# Patient Record
Sex: MALE | Race: WHITE | NOT HISPANIC OR LATINO | Employment: OTHER | URBAN - METROPOLITAN AREA
[De-identification: names, ages, dates, MRNs, and addresses within clinical notes are randomized per-mention and may not be internally consistent; named-entity substitution may affect disease eponyms.]

---

## 2019-06-11 ENCOUNTER — APPOINTMENT (EMERGENCY)
Dept: CT IMAGING | Facility: HOSPITAL | Age: 84
End: 2019-06-11
Payer: MEDICARE

## 2019-06-11 ENCOUNTER — APPOINTMENT (EMERGENCY)
Dept: RADIOLOGY | Facility: HOSPITAL | Age: 84
End: 2019-06-11
Payer: MEDICARE

## 2019-06-11 ENCOUNTER — HOSPITAL ENCOUNTER (EMERGENCY)
Facility: HOSPITAL | Age: 84
Discharge: HOME | End: 2019-06-11
Attending: EMERGENCY MEDICINE | Admitting: EMERGENCY MEDICINE
Payer: MEDICARE

## 2019-06-11 VITALS
HEART RATE: 69 BPM | OXYGEN SATURATION: 96 % | BODY MASS INDEX: 19.89 KG/M2 | SYSTOLIC BLOOD PRESSURE: 145 MMHG | DIASTOLIC BLOOD PRESSURE: 75 MMHG | RESPIRATION RATE: 20 BRPM | HEIGHT: 69 IN | TEMPERATURE: 97.5 F | WEIGHT: 134.26 LBS

## 2019-06-11 DIAGNOSIS — S70.00XA CONTUSION OF HIP: ICD-10-CM
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
## 2019-06-11 PROCEDURE — 72125 CT NECK SPINE W/O DYE: CPT

## 2019-06-11 PROCEDURE — 99284 EMERGENCY DEPT VISIT MOD MDM: CPT

## 2019-06-11 PROCEDURE — 93005 ELECTROCARDIOGRAM TRACING: CPT

## 2019-06-11 PROCEDURE — 73502 X-RAY EXAM HIP UNI 2-3 VIEWS: CPT

## 2019-06-11 PROCEDURE — 71046 X-RAY EXAM CHEST 2 VIEWS: CPT

## 2019-06-11 PROCEDURE — 99285 EMERGENCY DEPT VISIT HI MDM: CPT | Performed by: EMERGENCY MEDICINE

## 2019-06-11 PROCEDURE — 70450 CT HEAD/BRAIN W/O DYE: CPT

## 2019-06-11 RX ORDER — ASPIRIN 81 MG/1
81 TABLET ORAL DAILY
COMMUNITY
End: 2020-05-06 | Stop reason: HOSPADM

## 2019-06-12 LAB
ATRIAL RATE: 68 BPM
P AXIS: 60 DEGREES
PR INTERVAL: 174 MS
QRS AXIS: 52 DEGREES
QRSD INTERVAL: 96 MS
QT INTERVAL: 386 MS
QTC INTERVAL: 410 MS
T WAVE AXIS: -13 DEGREES
VENTRICULAR RATE: 68 BPM

## 2019-06-12 PROCEDURE — 93010 ELECTROCARDIOGRAM REPORT: CPT | Performed by: INTERNAL MEDICINE

## 2020-04-28 ENCOUNTER — APPOINTMENT (EMERGENCY)
Dept: RADIOLOGY | Facility: HOSPITAL | Age: 85
End: 2020-04-28
Payer: MEDICARE

## 2020-04-28 ENCOUNTER — HOSPITAL ENCOUNTER (EMERGENCY)
Facility: HOSPITAL | Age: 85
End: 2020-04-28
Attending: EMERGENCY MEDICINE
Payer: MEDICARE

## 2020-04-28 ENCOUNTER — HOSPITAL ENCOUNTER (INPATIENT)
Facility: HOSPITAL | Age: 85
LOS: 8 days | Discharge: HOME HEALTH | DRG: 518 | End: 2020-05-06
Attending: SURGERY | Admitting: SURGERY
Payer: MEDICARE

## 2020-04-28 ENCOUNTER — HOSPITAL ENCOUNTER (EMERGENCY)
Facility: HOSPITAL | Age: 85
Discharge: HOME | End: 2020-04-28
Attending: EMERGENCY MEDICINE | Admitting: EMERGENCY MEDICINE
Payer: MEDICARE

## 2020-04-28 ENCOUNTER — APPOINTMENT (EMERGENCY)
Dept: CT IMAGING | Facility: HOSPITAL | Age: 85
End: 2020-04-28
Payer: MEDICARE

## 2020-04-28 VITALS
HEART RATE: 95 BPM | TEMPERATURE: 98.4 F | DIASTOLIC BLOOD PRESSURE: 73 MMHG | SYSTOLIC BLOOD PRESSURE: 156 MMHG | OXYGEN SATURATION: 97 % | RESPIRATION RATE: 20 BRPM

## 2020-04-28 VITALS
WEIGHT: 134.26 LBS | OXYGEN SATURATION: 97 % | DIASTOLIC BLOOD PRESSURE: 79 MMHG | TEMPERATURE: 97.8 F | HEART RATE: 79 BPM | SYSTOLIC BLOOD PRESSURE: 166 MMHG | BODY MASS INDEX: 19.83 KG/M2 | RESPIRATION RATE: 18 BRPM

## 2020-04-28 DIAGNOSIS — M25.512 LEFT SHOULDER PAIN: ICD-10-CM

## 2020-04-28 DIAGNOSIS — S02.2XXA NASAL FRACTURE: ICD-10-CM

## 2020-04-28 DIAGNOSIS — S00.83XA TRAUMATIC HEMATOMA OF FOREHEAD, INITIAL ENCOUNTER: ICD-10-CM

## 2020-04-28 DIAGNOSIS — S12.111A CLOSED ODONTOID FRACTURE WITH TYPE II MORPHOLOGY AND POSTERIOR DISPLACEMENT, INITIAL ENCOUNTER (HCC): Primary | ICD-10-CM

## 2020-04-28 DIAGNOSIS — T07.XXXA ABRASIONS OF MULTIPLE SITES: ICD-10-CM

## 2020-04-28 DIAGNOSIS — S02.2XXA CLOSED FRACTURE OF NASAL BONE, INITIAL ENCOUNTER: ICD-10-CM
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
## 2020-04-28 DIAGNOSIS — S12.111A: Primary | ICD-10-CM

## 2020-04-28 DIAGNOSIS — M25.552 LEFT HIP PAIN: ICD-10-CM
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
## 2020-04-28 DIAGNOSIS — S09.90XA INJURY OF HEAD, INITIAL ENCOUNTER: ICD-10-CM

## 2020-04-28 DIAGNOSIS — T07.XXXA MULTIPLE ABRASIONS: ICD-10-CM

## 2020-04-28 DIAGNOSIS — S61.218A: ICD-10-CM

## 2020-04-28 DIAGNOSIS — S62.629A AVULSION FRACTURE OF MIDDLE PHALANX OF FINGER: ICD-10-CM

## 2020-04-28 PROBLEM — J98.11 ATELECTASIS: Status: ACTIVE | Noted: 2020-04-28

## 2020-04-28 PROBLEM — K76.89 LIVER CYST: Status: ACTIVE | Noted: 2020-04-28

## 2020-04-28 PROBLEM — R91.1 PULMONARY NODULE: Status: ACTIVE | Noted: 2020-04-28

## 2020-04-28 PROBLEM — N28.1 RENAL CYST: Status: ACTIVE | Noted: 2020-04-28

## 2020-04-28 PROBLEM — S12.110A ODONTOID FRACTURE WITH TYPE II MORPHOLOGY (HCC): Status: ACTIVE | Noted: 2020-04-28

## 2020-04-28 PROBLEM — R19.5 HEME POSITIVE STOOL: Status: ACTIVE | Noted: 2020-04-28

## 2020-04-28 LAB
ALBUMIN SERPL BCP-MCNC: 3.4 G/DL (ref 3.5–5)
ALP SERPL-CCNC: 111 U/L (ref 46–116)
ALT SERPL W P-5'-P-CCNC: 55 U/L (ref 12–78)
ANION GAP SERPL CALCULATED.3IONS-SCNC: 9 MMOL/L (ref 4–13)
APTT PPP: 27 SECONDS (ref 23–37)
AST SERPL W P-5'-P-CCNC: 38 U/L (ref 5–45)
ATRIAL RATE: 92 BPM
BACTERIA UR QL AUTO: ABNORMAL /HPF
BASOPHILS # BLD AUTO: 0.02 THOUSANDS/ΜL (ref 0–0.1)
BASOPHILS NFR BLD AUTO: 0 % (ref 0–1)
BILIRUB SERPL-MCNC: 0.6 MG/DL (ref 0.2–1)
BILIRUB UR QL STRIP: NEGATIVE
BUN SERPL-MCNC: 42 MG/DL (ref 5–25)
CALCIUM SERPL-MCNC: 9.4 MG/DL (ref 8.3–10.1)
CHLORIDE SERPL-SCNC: 105 MMOL/L (ref 100–108)
CLARITY UR: CLEAR
CO2 SERPL-SCNC: 23 MMOL/L (ref 21–32)
COLOR UR: YELLOW
CREAT SERPL-MCNC: 1.04 MG/DL (ref 0.6–1.3)
EOSINOPHIL # BLD AUTO: 0 THOUSAND/ΜL (ref 0–0.61)
EOSINOPHIL NFR BLD AUTO: 0 % (ref 0–6)
ERYTHROCYTE [DISTWIDTH] IN BLOOD BY AUTOMATED COUNT: 14.3 % (ref 11.6–15.1)
GFR SERPL CREATININE-BSD FRML MDRD: 66 ML/MIN/1.73SQ M
GLUCOSE SERPL-MCNC: 151 MG/DL (ref 65–140)
GLUCOSE UR STRIP-MCNC: NEGATIVE MG/DL
HCT VFR BLD AUTO: 37.6 % (ref 36.5–49.3)
HGB BLD-MCNC: 12.1 G/DL (ref 12–17)
HGB UR QL STRIP.AUTO: ABNORMAL
IMM GRANULOCYTES # BLD AUTO: 0.08 THOUSAND/UL (ref 0–0.2)
IMM GRANULOCYTES NFR BLD AUTO: 1 % (ref 0–2)
INR PPP: 1.16 (ref 0.84–1.19)
KETONES UR STRIP-MCNC: NEGATIVE MG/DL
LEUKOCYTE ESTERASE UR QL STRIP: NEGATIVE
LYMPHOCYTES # BLD AUTO: 0.52 THOUSANDS/ΜL (ref 0.6–4.47)
LYMPHOCYTES NFR BLD AUTO: 4 % (ref 14–44)
MCH RBC QN AUTO: 31.6 PG (ref 26.8–34.3)
MCHC RBC AUTO-ENTMCNC: 32.2 G/DL (ref 31.4–37.4)
MCV RBC AUTO: 98 FL (ref 82–98)
MONOCYTES # BLD AUTO: 0.71 THOUSAND/ΜL (ref 0.17–1.22)
MONOCYTES NFR BLD AUTO: 6 % (ref 4–12)
NEUTROPHILS # BLD AUTO: 11.43 THOUSANDS/ΜL (ref 1.85–7.62)
NEUTS SEG NFR BLD AUTO: 89 % (ref 43–75)
NITRITE UR QL STRIP: NEGATIVE
NON-SQ EPI CELLS URNS QL MICRO: ABNORMAL /HPF
NRBC BLD AUTO-RTO: 0 /100 WBCS
P AXIS: 76 DEGREES
PH UR STRIP.AUTO: 6 [PH]
PLATELET # BLD AUTO: 217 THOUSANDS/UL (ref 149–390)
PMV BLD AUTO: 9.7 FL (ref 8.9–12.7)
POTASSIUM SERPL-SCNC: 4.6 MMOL/L (ref 3.5–5.3)
PR INTERVAL: 166 MS
PROT SERPL-MCNC: 7.4 G/DL (ref 6.4–8.2)
PROT UR STRIP-MCNC: NEGATIVE MG/DL
PROTHROMBIN TIME: 14.9 SECONDS (ref 11.6–14.5)
QRS AXIS: 76 DEGREES
QRSD INTERVAL: 92 MS
QT INTERVAL: 356 MS
QTC INTERVAL: 440 MS
RBC # BLD AUTO: 3.83 MILLION/UL (ref 3.88–5.62)
RBC #/AREA URNS AUTO: ABNORMAL /HPF
SODIUM SERPL-SCNC: 137 MMOL/L (ref 136–145)
SP GR UR STRIP.AUTO: 1.01 (ref 1–1.03)
T WAVE AXIS: 41 DEGREES
UROBILINOGEN UR QL STRIP.AUTO: 0.2 E.U./DL
VENTRICULAR RATE: 92 BPM
WBC # BLD AUTO: 12.76 THOUSAND/UL (ref 4.31–10.16)
WBC #/AREA URNS AUTO: ABNORMAL /HPF

## 2020-04-28 PROCEDURE — 73030 X-RAY EXAM OF SHOULDER: CPT

## 2020-04-28 PROCEDURE — 99285 EMERGENCY DEPT VISIT HI MDM: CPT

## 2020-04-28 PROCEDURE — 70498 CT ANGIOGRAPHY NECK: CPT

## 2020-04-28 PROCEDURE — 99284 EMERGENCY DEPT VISIT MOD MDM: CPT | Performed by: PHYSICIAN ASSISTANT

## 2020-04-28 PROCEDURE — 93005 ELECTROCARDIOGRAM TRACING: CPT

## 2020-04-28 PROCEDURE — 96375 TX/PRO/DX INJ NEW DRUG ADDON: CPT

## 2020-04-28 PROCEDURE — 99222 1ST HOSP IP/OBS MODERATE 55: CPT | Performed by: SURGERY

## 2020-04-28 PROCEDURE — 99285 EMERGENCY DEPT VISIT HI MDM: CPT | Performed by: EMERGENCY MEDICINE

## 2020-04-28 PROCEDURE — 73140 X-RAY EXAM OF FINGER(S): CPT

## 2020-04-28 PROCEDURE — 85730 THROMBOPLASTIN TIME PARTIAL: CPT | Performed by: EMERGENCY MEDICINE

## 2020-04-28 PROCEDURE — 71260 CT THORAX DX C+: CPT

## 2020-04-28 PROCEDURE — 85610 PROTHROMBIN TIME: CPT | Performed by: EMERGENCY MEDICINE

## 2020-04-28 PROCEDURE — 73502 X-RAY EXAM HIP UNI 2-3 VIEWS: CPT

## 2020-04-28 PROCEDURE — 72125 CT NECK SPINE W/O DYE: CPT

## 2020-04-28 PROCEDURE — 85025 COMPLETE CBC W/AUTO DIFF WBC: CPT | Performed by: EMERGENCY MEDICINE

## 2020-04-28 PROCEDURE — 81001 URINALYSIS AUTO W/SCOPE: CPT | Performed by: EMERGENCY MEDICINE

## 2020-04-28 PROCEDURE — 99284 EMERGENCY DEPT VISIT MOD MDM: CPT

## 2020-04-28 PROCEDURE — 80053 COMPREHEN METABOLIC PANEL: CPT | Performed by: EMERGENCY MEDICINE

## 2020-04-28 PROCEDURE — 96374 THER/PROPH/DIAG INJ IV PUSH: CPT

## 2020-04-28 PROCEDURE — 93010 ELECTROCARDIOGRAM REPORT: CPT | Performed by: INTERNAL MEDICINE

## 2020-04-28 PROCEDURE — 36415 COLL VENOUS BLD VENIPUNCTURE: CPT | Performed by: EMERGENCY MEDICINE

## 2020-04-28 PROCEDURE — 70450 CT HEAD/BRAIN W/O DYE: CPT

## 2020-04-28 PROCEDURE — 70496 CT ANGIOGRAPHY HEAD: CPT

## 2020-04-28 PROCEDURE — 74177 CT ABD & PELVIS W/CONTRAST: CPT

## 2020-04-28 RX ORDER — SODIUM CHLORIDE, SODIUM GLUCONATE, SODIUM ACETATE, POTASSIUM CHLORIDE, MAGNESIUM CHLORIDE, SODIUM PHOSPHATE, DIBASIC, AND POTASSIUM PHOSPHATE .53; .5; .37; .037; .03; .012; .00082 G/100ML; G/100ML; G/100ML; G/100ML; G/100ML; G/100ML; G/100ML
50 INJECTION, SOLUTION INTRAVENOUS CONTINUOUS
Status: DISCONTINUED | OUTPATIENT
Start: 2020-04-28 | End: 2020-04-29

## 2020-04-28 RX ORDER — ACETAMINOPHEN 325 MG/1
650 TABLET ORAL EVERY 6 HOURS PRN
Status: DISCONTINUED | OUTPATIENT
Start: 2020-04-28 | End: 2020-04-28

## 2020-04-28 RX ORDER — ACETAMINOPHEN 500 MG
500 TABLET ORAL EVERY 4 HOURS PRN
Qty: 30 TABLET | Refills: 0 | Status: SHIPPED | OUTPATIENT
Start: 2020-04-28

## 2020-04-28 RX ORDER — ONDANSETRON 2 MG/ML
4 INJECTION INTRAMUSCULAR; INTRAVENOUS ONCE
Status: COMPLETED | OUTPATIENT
Start: 2020-04-28 | End: 2020-04-28

## 2020-04-28 RX ORDER — OXYCODONE HYDROCHLORIDE 5 MG/1
2.5 TABLET ORAL EVERY 4 HOURS PRN
Status: DISCONTINUED | OUTPATIENT
Start: 2020-04-28 | End: 2020-04-29

## 2020-04-28 RX ORDER — ONDANSETRON 2 MG/ML
4 INJECTION INTRAMUSCULAR; INTRAVENOUS EVERY 4 HOURS PRN
Status: DISCONTINUED | OUTPATIENT
Start: 2020-04-28 | End: 2020-05-06 | Stop reason: HOSPADM

## 2020-04-28 RX ORDER — OXYCODONE HYDROCHLORIDE 5 MG/1
5 TABLET ORAL EVERY 4 HOURS PRN
Status: DISCONTINUED | OUTPATIENT
Start: 2020-04-28 | End: 2020-04-29

## 2020-04-28 RX ORDER — GINSENG 100 MG
1 CAPSULE ORAL ONCE
Status: COMPLETED | OUTPATIENT
Start: 2020-04-28 | End: 2020-04-28

## 2020-04-28 RX ORDER — ACETAMINOPHEN 325 MG/1
650 TABLET ORAL EVERY 6 HOURS PRN
Status: DISCONTINUED | OUTPATIENT
Start: 2020-04-28 | End: 2020-04-30

## 2020-04-28 RX ORDER — AMOXICILLIN 250 MG
1 CAPSULE ORAL
Status: DISCONTINUED | OUTPATIENT
Start: 2020-04-28 | End: 2020-05-01

## 2020-04-28 RX ORDER — BACITRACIN, NEOMYCIN, POLYMYXIN B 400; 3.5; 5 [USP'U]/G; MG/G; [USP'U]/G
1 OINTMENT TOPICAL ONCE
Status: COMPLETED | OUTPATIENT
Start: 2020-04-28 | End: 2020-04-28

## 2020-04-28 RX ORDER — CHLORHEXIDINE GLUCONATE 0.12 MG/ML
15 RINSE ORAL EVERY 12 HOURS SCHEDULED
Status: DISCONTINUED | OUTPATIENT
Start: 2020-04-28 | End: 2020-04-28

## 2020-04-28 RX ORDER — AMOXICILLIN 250 MG
1 CAPSULE ORAL
Status: DISCONTINUED | OUTPATIENT
Start: 2020-04-28 | End: 2020-04-28

## 2020-04-28 RX ORDER — FENTANYL CITRATE 50 UG/ML
25 INJECTION, SOLUTION INTRAMUSCULAR; INTRAVENOUS ONCE
Status: COMPLETED | OUTPATIENT
Start: 2020-04-28 | End: 2020-04-28

## 2020-04-28 RX ORDER — LIDOCAINE HYDROCHLORIDE AND EPINEPHRINE 10; 10 MG/ML; UG/ML
5 INJECTION, SOLUTION INFILTRATION; PERINEURAL ONCE
Status: DISCONTINUED | OUTPATIENT
Start: 2020-04-28 | End: 2020-04-28 | Stop reason: HOSPADM

## 2020-04-28 RX ORDER — BACITRACIN, NEOMYCIN, POLYMYXIN B 400; 3.5; 5 [USP'U]/G; MG/G; [USP'U]/G
OINTMENT TOPICAL 2 TIMES DAILY
Qty: 15 G | Refills: 0 | Status: ON HOLD | OUTPATIENT
Start: 2020-04-28 | End: 2022-04-22 | Stop reason: ALTCHOICE

## 2020-04-28 RX ORDER — GINSENG 100 MG
1 CAPSULE ORAL 2 TIMES DAILY
Status: DISCONTINUED | OUTPATIENT
Start: 2020-04-29 | End: 2020-04-29

## 2020-04-28 RX ADMIN — BACITRACIN ZINC 1 LARGE APPLICATION: 500 OINTMENT TOPICAL at 20:29

## 2020-04-28 RX ADMIN — FENTANYL CITRATE 25 MCG: 50 INJECTION, SOLUTION INTRAMUSCULAR; INTRAVENOUS at 19:25

## 2020-04-28 RX ADMIN — ONDANSETRON 4 MG: 2 INJECTION INTRAMUSCULAR; INTRAVENOUS at 19:25

## 2020-04-28 RX ADMIN — IOHEXOL 120 ML: 350 INJECTION, SOLUTION INTRAVENOUS at 20:17

## 2020-04-28 RX ADMIN — BACITRACIN ZINC NEOMYCIN SULFATE POLYMYXIN B SULFATE 1 LARGE APPLICATION: 400; 3.5; 5 OINTMENT TOPICAL at 12:07

## 2020-04-29 ENCOUNTER — APPOINTMENT (INPATIENT)
Dept: RADIOLOGY | Facility: HOSPITAL | Age: 85
DRG: 518 | End: 2020-04-29
Payer: MEDICARE

## 2020-04-29 LAB
ANION GAP SERPL CALCULATED.3IONS-SCNC: 3 MMOL/L (ref 4–13)
BASOPHILS # BLD AUTO: 0.02 THOUSANDS/ΜL (ref 0–0.1)
BASOPHILS NFR BLD AUTO: 0 % (ref 0–1)
BUN SERPL-MCNC: 44 MG/DL (ref 5–25)
CA-I BLD-SCNC: 1.23 MMOL/L (ref 1.12–1.32)
CALCIUM SERPL-MCNC: 9.6 MG/DL (ref 8.3–10.1)
CHLORIDE SERPL-SCNC: 112 MMOL/L (ref 100–108)
CO2 SERPL-SCNC: 23 MMOL/L (ref 21–32)
CREAT SERPL-MCNC: 0.98 MG/DL (ref 0.6–1.3)
EOSINOPHIL # BLD AUTO: 0 THOUSAND/ΜL (ref 0–0.61)
EOSINOPHIL NFR BLD AUTO: 0 % (ref 0–6)
ERYTHROCYTE [DISTWIDTH] IN BLOOD BY AUTOMATED COUNT: 14.4 % (ref 11.6–15.1)
GFR SERPL CREATININE-BSD FRML MDRD: 71 ML/MIN/1.73SQ M
GLUCOSE SERPL-MCNC: 100 MG/DL (ref 65–140)
HCT VFR BLD AUTO: 35.4 % (ref 36.5–49.3)
HGB BLD-MCNC: 11.8 G/DL (ref 12–17)
IMM GRANULOCYTES # BLD AUTO: 0.05 THOUSAND/UL (ref 0–0.2)
IMM GRANULOCYTES NFR BLD AUTO: 1 % (ref 0–2)
LYMPHOCYTES # BLD AUTO: 1.16 THOUSANDS/ΜL (ref 0.6–4.47)
LYMPHOCYTES NFR BLD AUTO: 11 % (ref 14–44)
MAGNESIUM SERPL-MCNC: 2.3 MG/DL (ref 1.6–2.6)
MCH RBC QN AUTO: 32.2 PG (ref 26.8–34.3)
MCHC RBC AUTO-ENTMCNC: 33.3 G/DL (ref 31.4–37.4)
MCV RBC AUTO: 97 FL (ref 82–98)
MONOCYTES # BLD AUTO: 1.04 THOUSAND/ΜL (ref 0.17–1.22)
MONOCYTES NFR BLD AUTO: 10 % (ref 4–12)
NEUTROPHILS # BLD AUTO: 8.63 THOUSANDS/ΜL (ref 1.85–7.62)
NEUTS SEG NFR BLD AUTO: 78 % (ref 43–75)
NRBC BLD AUTO-RTO: 0 /100 WBCS
PHOSPHATE SERPL-MCNC: 2.6 MG/DL (ref 2.3–4.1)
PLATELET # BLD AUTO: 203 THOUSANDS/UL (ref 149–390)
PMV BLD AUTO: 10 FL (ref 8.9–12.7)
POTASSIUM SERPL-SCNC: 4.1 MMOL/L (ref 3.5–5.3)
RBC # BLD AUTO: 3.67 MILLION/UL (ref 3.88–5.62)
SODIUM SERPL-SCNC: 138 MMOL/L (ref 136–145)
WBC # BLD AUTO: 10.9 THOUSAND/UL (ref 4.31–10.16)

## 2020-04-29 PROCEDURE — 97166 OT EVAL MOD COMPLEX 45 MIN: CPT

## 2020-04-29 PROCEDURE — 85025 COMPLETE CBC W/AUTO DIFF WBC: CPT | Performed by: PHYSICIAN ASSISTANT

## 2020-04-29 PROCEDURE — 97163 PT EVAL HIGH COMPLEX 45 MIN: CPT

## 2020-04-29 PROCEDURE — 99233 SBSQ HOSP IP/OBS HIGH 50: CPT | Performed by: SURGERY

## 2020-04-29 PROCEDURE — 83735 ASSAY OF MAGNESIUM: CPT | Performed by: PHYSICIAN ASSISTANT

## 2020-04-29 PROCEDURE — 72040 X-RAY EXAM NECK SPINE 2-3 VW: CPT

## 2020-04-29 PROCEDURE — 80048 BASIC METABOLIC PNL TOTAL CA: CPT | Performed by: PHYSICIAN ASSISTANT

## 2020-04-29 PROCEDURE — 84100 ASSAY OF PHOSPHORUS: CPT | Performed by: PHYSICIAN ASSISTANT

## 2020-04-29 PROCEDURE — 82330 ASSAY OF CALCIUM: CPT | Performed by: PHYSICIAN ASSISTANT

## 2020-04-29 PROCEDURE — 99222 1ST HOSP IP/OBS MODERATE 55: CPT | Performed by: NEUROLOGICAL SURGERY

## 2020-04-29 RX ORDER — GINSENG 100 MG
1 CAPSULE ORAL 2 TIMES DAILY
Status: DISCONTINUED | OUTPATIENT
Start: 2020-04-29 | End: 2020-05-06 | Stop reason: HOSPADM

## 2020-04-29 RX ADMIN — NICOTINE 1 PATCH: 7 PATCH TRANSDERMAL at 08:22

## 2020-04-29 RX ADMIN — BACITRACIN 1 SMALL APPLICATION: 500 OINTMENT TOPICAL at 18:16

## 2020-04-29 RX ADMIN — ENOXAPARIN SODIUM 40 MG: 40 INJECTION SUBCUTANEOUS at 08:23

## 2020-04-29 RX ADMIN — SODIUM CHLORIDE, SODIUM GLUCONATE, SODIUM ACETATE, POTASSIUM CHLORIDE, MAGNESIUM CHLORIDE, SODIUM PHOSPHATE, DIBASIC, AND POTASSIUM PHOSPHATE 50 ML/HR: .53; .5; .37; .037; .03; .012; .00082 INJECTION, SOLUTION INTRAVENOUS at 00:34

## 2020-04-29 RX ADMIN — BACITRACIN 1 SMALL APPLICATION: 500 OINTMENT TOPICAL at 02:18

## 2020-04-29 RX ADMIN — POTASSIUM & SODIUM PHOSPHATES POWDER PACK 280-160-250 MG 1 PACKET: 280-160-250 PACK at 08:23

## 2020-04-29 RX ADMIN — BACITRACIN 1 SMALL APPLICATION: 500 OINTMENT TOPICAL at 08:23

## 2020-04-30 PROBLEM — K42.9 UMBILICAL HERNIA: Status: ACTIVE | Noted: 2020-04-30

## 2020-04-30 PROBLEM — C67.9 BLADDER CANCER (HCC): Chronic | Status: ACTIVE | Noted: 2020-04-30

## 2020-04-30 PROBLEM — R41.0 DELIRIUM: Status: ACTIVE | Noted: 2020-04-30

## 2020-04-30 PROBLEM — I25.10 CAD (CORONARY ARTERY DISEASE): Status: ACTIVE | Noted: 2020-04-30

## 2020-04-30 PROBLEM — Z95.5 H/O HEART ARTERY STENT: Status: ACTIVE | Noted: 2020-04-30
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
## 2020-04-30 LAB
ANION GAP SERPL CALCULATED.3IONS-SCNC: 6 MMOL/L (ref 4–13)
BASOPHILS # BLD AUTO: 0.02 THOUSANDS/ΜL (ref 0–0.1)
BASOPHILS NFR BLD AUTO: 0 % (ref 0–1)
BUN SERPL-MCNC: 38 MG/DL (ref 5–25)
CALCIUM SERPL-MCNC: 9.6 MG/DL (ref 8.3–10.1)
CHLORIDE SERPL-SCNC: 111 MMOL/L (ref 100–108)
CO2 SERPL-SCNC: 24 MMOL/L (ref 21–32)
CREAT SERPL-MCNC: 0.86 MG/DL (ref 0.6–1.3)
EOSINOPHIL # BLD AUTO: 0.01 THOUSAND/ΜL (ref 0–0.61)
EOSINOPHIL NFR BLD AUTO: 0 % (ref 0–6)
ERYTHROCYTE [DISTWIDTH] IN BLOOD BY AUTOMATED COUNT: 14.6 % (ref 11.6–15.1)
GFR SERPL CREATININE-BSD FRML MDRD: 80 ML/MIN/1.73SQ M
GLUCOSE SERPL-MCNC: 115 MG/DL (ref 65–140)
HCT VFR BLD AUTO: 32.5 % (ref 36.5–49.3)
HGB BLD-MCNC: 10.6 G/DL (ref 12–17)
IMM GRANULOCYTES # BLD AUTO: 0.04 THOUSAND/UL (ref 0–0.2)
IMM GRANULOCYTES NFR BLD AUTO: 0 % (ref 0–2)
LYMPHOCYTES # BLD AUTO: 0.82 THOUSANDS/ΜL (ref 0.6–4.47)
LYMPHOCYTES NFR BLD AUTO: 8 % (ref 14–44)
MCH RBC QN AUTO: 31.7 PG (ref 26.8–34.3)
MCHC RBC AUTO-ENTMCNC: 32.6 G/DL (ref 31.4–37.4)
MCV RBC AUTO: 97 FL (ref 82–98)
MONOCYTES # BLD AUTO: 0.76 THOUSAND/ΜL (ref 0.17–1.22)
MONOCYTES NFR BLD AUTO: 7 % (ref 4–12)
NEUTROPHILS # BLD AUTO: 8.7 THOUSANDS/ΜL (ref 1.85–7.62)
NEUTS SEG NFR BLD AUTO: 85 % (ref 43–75)
NRBC BLD AUTO-RTO: 0 /100 WBCS
PA ADP BLD-ACNC: 548 ARU
PLATELET # BLD AUTO: 183 THOUSANDS/UL (ref 149–390)
PMV BLD AUTO: 10.4 FL (ref 8.9–12.7)
POTASSIUM SERPL-SCNC: 4 MMOL/L (ref 3.5–5.3)
RBC # BLD AUTO: 3.34 MILLION/UL (ref 3.88–5.62)
SODIUM SERPL-SCNC: 141 MMOL/L (ref 136–145)
WBC # BLD AUTO: 10.35 THOUSAND/UL (ref 4.31–10.16)

## 2020-04-30 PROCEDURE — NC001 PR NO CHARGE: Performed by: NURSE PRACTITIONER

## 2020-04-30 PROCEDURE — 99233 SBSQ HOSP IP/OBS HIGH 50: CPT | Performed by: PHYSICIAN ASSISTANT

## 2020-04-30 PROCEDURE — 85025 COMPLETE CBC W/AUTO DIFF WBC: CPT | Performed by: PHYSICIAN ASSISTANT

## 2020-04-30 PROCEDURE — 80048 BASIC METABOLIC PNL TOTAL CA: CPT | Performed by: PHYSICIAN ASSISTANT

## 2020-04-30 PROCEDURE — 99232 SBSQ HOSP IP/OBS MODERATE 35: CPT | Performed by: EMERGENCY MEDICINE

## 2020-04-30 PROCEDURE — 85576 BLOOD PLATELET AGGREGATION: CPT | Performed by: PHYSICIAN ASSISTANT

## 2020-04-30 PROCEDURE — 99222 1ST HOSP IP/OBS MODERATE 55: CPT | Performed by: INTERNAL MEDICINE

## 2020-04-30 PROCEDURE — NC001 PR NO CHARGE: Performed by: NEUROLOGICAL SURGERY

## 2020-04-30 RX ORDER — POLYETHYLENE GLYCOL 3350 17 G/17G
17 POWDER, FOR SOLUTION ORAL DAILY PRN
Status: DISCONTINUED | OUTPATIENT
Start: 2020-04-30 | End: 2020-05-06 | Stop reason: HOSPADM

## 2020-04-30 RX ORDER — ACETAMINOPHEN 325 MG/1
975 TABLET ORAL EVERY 8 HOURS SCHEDULED
Status: DISCONTINUED | OUTPATIENT
Start: 2020-04-30 | End: 2020-05-06 | Stop reason: HOSPADM

## 2020-04-30 RX ORDER — LANOLIN ALCOHOL/MO/W.PET/CERES
3 CREAM (GRAM) TOPICAL
Status: DISCONTINUED | OUTPATIENT
Start: 2020-04-30 | End: 2020-05-06 | Stop reason: HOSPADM

## 2020-04-30 RX ORDER — LIDOCAINE 50 MG/G
1 PATCH TOPICAL DAILY
Status: DISCONTINUED | OUTPATIENT
Start: 2020-04-30 | End: 2020-05-06 | Stop reason: HOSPADM

## 2020-04-30 RX ADMIN — SENNOSIDES AND DOCUSATE SODIUM 1 TABLET: 8.6; 5 TABLET ORAL at 21:42

## 2020-04-30 RX ADMIN — ACETAMINOPHEN 975 MG: 325 TABLET ORAL at 21:43

## 2020-04-30 RX ADMIN — BACITRACIN 1 SMALL APPLICATION: 500 OINTMENT TOPICAL at 09:11

## 2020-04-30 RX ADMIN — MELATONIN 3 MG: at 21:42

## 2020-04-30 RX ADMIN — ENOXAPARIN SODIUM 40 MG: 40 INJECTION SUBCUTANEOUS at 09:11

## 2020-04-30 RX ADMIN — LIDOCAINE 1 PATCH: 50 PATCH TOPICAL at 10:13

## 2020-04-30 RX ADMIN — ACETAMINOPHEN 975 MG: 325 TABLET ORAL at 13:46

## 2020-04-30 RX ADMIN — BACITRACIN 1 SMALL APPLICATION: 500 OINTMENT TOPICAL at 17:07

## 2020-04-30 RX ADMIN — NICOTINE 1 PATCH: 7 PATCH TRANSDERMAL at 09:11

## 2020-05-01 PROBLEM — R19.5 HEME POSITIVE STOOL: Status: RESOLVED | Noted: 2020-04-28 | Resolved: 2020-05-01

## 2020-05-01 LAB
ANION GAP SERPL CALCULATED.3IONS-SCNC: 5 MMOL/L (ref 4–13)
BASOPHILS # BLD AUTO: 0.05 THOUSANDS/ΜL (ref 0–0.1)
BASOPHILS NFR BLD AUTO: 1 % (ref 0–1)
BUN SERPL-MCNC: 37 MG/DL (ref 5–25)
CALCIUM SERPL-MCNC: 9.1 MG/DL (ref 8.3–10.1)
CHLORIDE SERPL-SCNC: 110 MMOL/L (ref 100–108)
CO2 SERPL-SCNC: 23 MMOL/L (ref 21–32)
CREAT SERPL-MCNC: 0.89 MG/DL (ref 0.6–1.3)
EOSINOPHIL # BLD AUTO: 0.01 THOUSAND/ΜL (ref 0–0.61)
EOSINOPHIL NFR BLD AUTO: 0 % (ref 0–6)
ERYTHROCYTE [DISTWIDTH] IN BLOOD BY AUTOMATED COUNT: 14.5 % (ref 11.6–15.1)
GFR SERPL CREATININE-BSD FRML MDRD: 79 ML/MIN/1.73SQ M
GLUCOSE SERPL-MCNC: 98 MG/DL (ref 65–140)
HCT VFR BLD AUTO: 30.8 % (ref 36.5–49.3)
HGB BLD-MCNC: 9.9 G/DL (ref 12–17)
IMM GRANULOCYTES # BLD AUTO: 0.04 THOUSAND/UL (ref 0–0.2)
IMM GRANULOCYTES NFR BLD AUTO: 0 % (ref 0–2)
LYMPHOCYTES # BLD AUTO: 0.98 THOUSANDS/ΜL (ref 0.6–4.47)
LYMPHOCYTES NFR BLD AUTO: 10 % (ref 14–44)
MCH RBC QN AUTO: 31.8 PG (ref 26.8–34.3)
MCHC RBC AUTO-ENTMCNC: 32.1 G/DL (ref 31.4–37.4)
MCV RBC AUTO: 99 FL (ref 82–98)
MONOCYTES # BLD AUTO: 0.72 THOUSAND/ΜL (ref 0.17–1.22)
MONOCYTES NFR BLD AUTO: 7 % (ref 4–12)
NEUTROPHILS # BLD AUTO: 8.05 THOUSANDS/ΜL (ref 1.85–7.62)
NEUTS SEG NFR BLD AUTO: 82 % (ref 43–75)
NRBC BLD AUTO-RTO: 0 /100 WBCS
PLATELET # BLD AUTO: 185 THOUSANDS/UL (ref 149–390)
PMV BLD AUTO: 10.3 FL (ref 8.9–12.7)
POTASSIUM SERPL-SCNC: 3.9 MMOL/L (ref 3.5–5.3)
RBC # BLD AUTO: 3.11 MILLION/UL (ref 3.88–5.62)
SODIUM SERPL-SCNC: 138 MMOL/L (ref 136–145)
TSH SERPL DL<=0.05 MIU/L-ACNC: 2.96 UIU/ML (ref 0.36–3.74)
VIT B12 SERPL-MCNC: 536 PG/ML (ref 100–900)
WBC # BLD AUTO: 9.85 THOUSAND/UL (ref 4.31–10.16)

## 2020-05-01 PROCEDURE — 99232 SBSQ HOSP IP/OBS MODERATE 35: CPT | Performed by: NURSE PRACTITIONER

## 2020-05-01 PROCEDURE — 84443 ASSAY THYROID STIM HORMONE: CPT | Performed by: EMERGENCY MEDICINE

## 2020-05-01 PROCEDURE — 97116 GAIT TRAINING THERAPY: CPT

## 2020-05-01 PROCEDURE — 99232 SBSQ HOSP IP/OBS MODERATE 35: CPT | Performed by: EMERGENCY MEDICINE

## 2020-05-01 PROCEDURE — 82607 VITAMIN B-12: CPT | Performed by: EMERGENCY MEDICINE

## 2020-05-01 PROCEDURE — 99024 POSTOP FOLLOW-UP VISIT: CPT | Performed by: PHYSICIAN ASSISTANT

## 2020-05-01 PROCEDURE — 80048 BASIC METABOLIC PNL TOTAL CA: CPT | Performed by: NURSE PRACTITIONER

## 2020-05-01 PROCEDURE — 85025 COMPLETE CBC W/AUTO DIFF WBC: CPT | Performed by: NURSE PRACTITIONER

## 2020-05-01 PROCEDURE — 97535 SELF CARE MNGMENT TRAINING: CPT

## 2020-05-01 RX ORDER — AMOXICILLIN 250 MG
1 CAPSULE ORAL 2 TIMES DAILY
Status: DISCONTINUED | OUTPATIENT
Start: 2020-05-01 | End: 2020-05-06 | Stop reason: HOSPADM

## 2020-05-01 RX ORDER — AMOXICILLIN AND CLAVULANATE POTASSIUM 875; 125 MG/1; MG/1
1 TABLET, FILM COATED ORAL EVERY 12 HOURS SCHEDULED
Status: DISCONTINUED | OUTPATIENT
Start: 2020-05-01 | End: 2020-05-06 | Stop reason: HOSPADM

## 2020-05-01 RX ADMIN — ENOXAPARIN SODIUM 30 MG: 30 INJECTION SUBCUTANEOUS at 21:23

## 2020-05-01 RX ADMIN — BACITRACIN 1 SMALL APPLICATION: 500 OINTMENT TOPICAL at 10:06

## 2020-05-01 RX ADMIN — ACETAMINOPHEN 975 MG: 325 TABLET ORAL at 06:02

## 2020-05-01 RX ADMIN — ACETAMINOPHEN 975 MG: 325 TABLET ORAL at 13:00

## 2020-05-01 RX ADMIN — NICOTINE 1 PATCH: 7 PATCH TRANSDERMAL at 10:00

## 2020-05-01 RX ADMIN — ACETAMINOPHEN 975 MG: 325 TABLET ORAL at 21:23

## 2020-05-01 RX ADMIN — LIDOCAINE 1 PATCH: 50 PATCH TOPICAL at 10:00

## 2020-05-01 RX ADMIN — MELATONIN 3 MG: at 21:23

## 2020-05-01 RX ADMIN — SENNOSIDES AND DOCUSATE SODIUM 1 TABLET: 8.6; 5 TABLET ORAL at 17:02

## 2020-05-01 RX ADMIN — AMOXICILLIN AND CLAVULANATE POTASSIUM 1 TABLET: 875; 125 TABLET, FILM COATED ORAL at 21:23

## 2020-05-01 RX ADMIN — ENOXAPARIN SODIUM 30 MG: 30 INJECTION SUBCUTANEOUS at 10:00

## 2020-05-01 RX ADMIN — BACITRACIN 1 SMALL APPLICATION: 500 OINTMENT TOPICAL at 17:02

## 2020-05-01 RX ADMIN — AMOXICILLIN AND CLAVULANATE POTASSIUM 1 TABLET: 875; 125 TABLET, FILM COATED ORAL at 09:59

## 2020-05-02 ENCOUNTER — APPOINTMENT (INPATIENT)
Dept: NON INVASIVE DIAGNOSTICS | Facility: HOSPITAL | Age: 85
DRG: 518 | End: 2020-05-02
Payer: MEDICARE

## 2020-05-02 PROCEDURE — 99232 SBSQ HOSP IP/OBS MODERATE 35: CPT | Performed by: SURGERY

## 2020-05-02 PROCEDURE — 93306 TTE W/DOPPLER COMPLETE: CPT

## 2020-05-02 PROCEDURE — 93306 TTE W/DOPPLER COMPLETE: CPT | Performed by: INTERNAL MEDICINE

## 2020-05-02 RX ORDER — METOCLOPRAMIDE 10 MG/1
10 TABLET ORAL ONCE
Status: COMPLETED | OUTPATIENT
Start: 2020-05-02 | End: 2020-05-02

## 2020-05-02 RX ADMIN — SENNOSIDES AND DOCUSATE SODIUM 1 TABLET: 8.6; 5 TABLET ORAL at 17:02

## 2020-05-02 RX ADMIN — ENOXAPARIN SODIUM 30 MG: 30 INJECTION SUBCUTANEOUS at 09:04

## 2020-05-02 RX ADMIN — BACITRACIN 1 SMALL APPLICATION: 500 OINTMENT TOPICAL at 09:06

## 2020-05-02 RX ADMIN — MELATONIN 3 MG: at 22:30

## 2020-05-02 RX ADMIN — AMOXICILLIN AND CLAVULANATE POTASSIUM 1 TABLET: 875; 125 TABLET, FILM COATED ORAL at 22:30

## 2020-05-02 RX ADMIN — LIDOCAINE 1 PATCH: 50 PATCH TOPICAL at 09:05

## 2020-05-02 RX ADMIN — SENNOSIDES AND DOCUSATE SODIUM 1 TABLET: 8.6; 5 TABLET ORAL at 09:06

## 2020-05-02 RX ADMIN — AMOXICILLIN AND CLAVULANATE POTASSIUM 1 TABLET: 875; 125 TABLET, FILM COATED ORAL at 09:05

## 2020-05-02 RX ADMIN — ACETAMINOPHEN 975 MG: 325 TABLET ORAL at 22:26

## 2020-05-02 RX ADMIN — BACITRACIN 1 SMALL APPLICATION: 500 OINTMENT TOPICAL at 17:03

## 2020-05-02 RX ADMIN — ENOXAPARIN SODIUM 30 MG: 30 INJECTION SUBCUTANEOUS at 22:30

## 2020-05-02 RX ADMIN — METOCLOPRAMIDE HYDROCHLORIDE 10 MG: 10 TABLET ORAL at 22:30

## 2020-05-03 LAB
ANION GAP SERPL CALCULATED.3IONS-SCNC: 5 MMOL/L (ref 4–13)
APTT PPP: 30 SECONDS (ref 23–37)
BASOPHILS # BLD AUTO: 0.05 THOUSANDS/ΜL (ref 0–0.1)
BASOPHILS NFR BLD AUTO: 1 % (ref 0–1)
BUN SERPL-MCNC: 22 MG/DL (ref 5–25)
CALCIUM SERPL-MCNC: 9.2 MG/DL (ref 8.3–10.1)
CHLORIDE SERPL-SCNC: 110 MMOL/L (ref 100–108)
CO2 SERPL-SCNC: 26 MMOL/L (ref 21–32)
CREAT SERPL-MCNC: 0.82 MG/DL (ref 0.6–1.3)
EOSINOPHIL # BLD AUTO: 0.06 THOUSAND/ΜL (ref 0–0.61)
EOSINOPHIL NFR BLD AUTO: 1 % (ref 0–6)
ERYTHROCYTE [DISTWIDTH] IN BLOOD BY AUTOMATED COUNT: 14.5 % (ref 11.6–15.1)
EST. AVERAGE GLUCOSE BLD GHB EST-MCNC: 117 MG/DL
GFR SERPL CREATININE-BSD FRML MDRD: 81 ML/MIN/1.73SQ M
GLUCOSE SERPL-MCNC: 96 MG/DL (ref 65–140)
HBA1C MFR BLD: 5.7 %
HCT VFR BLD AUTO: 28.6 % (ref 36.5–49.3)
HGB BLD-MCNC: 9.3 G/DL (ref 12–17)
IMM GRANULOCYTES # BLD AUTO: 0.06 THOUSAND/UL (ref 0–0.2)
IMM GRANULOCYTES NFR BLD AUTO: 1 % (ref 0–2)
INR PPP: 1.06 (ref 0.84–1.19)
LYMPHOCYTES # BLD AUTO: 1.2 THOUSANDS/ΜL (ref 0.6–4.47)
LYMPHOCYTES NFR BLD AUTO: 19 % (ref 14–44)
MCH RBC QN AUTO: 31.8 PG (ref 26.8–34.3)
MCHC RBC AUTO-ENTMCNC: 32.5 G/DL (ref 31.4–37.4)
MCV RBC AUTO: 98 FL (ref 82–98)
MONOCYTES # BLD AUTO: 0.68 THOUSAND/ΜL (ref 0.17–1.22)
MONOCYTES NFR BLD AUTO: 11 % (ref 4–12)
NEUTROPHILS # BLD AUTO: 4.42 THOUSANDS/ΜL (ref 1.85–7.62)
NEUTS SEG NFR BLD AUTO: 67 % (ref 43–75)
NRBC BLD AUTO-RTO: 0 /100 WBCS
PLATELET # BLD AUTO: 220 THOUSANDS/UL (ref 149–390)
PMV BLD AUTO: 10.1 FL (ref 8.9–12.7)
POTASSIUM SERPL-SCNC: 4 MMOL/L (ref 3.5–5.3)
PROTHROMBIN TIME: 13.4 SECONDS (ref 11.6–14.5)
RBC # BLD AUTO: 2.92 MILLION/UL (ref 3.88–5.62)
SODIUM SERPL-SCNC: 141 MMOL/L (ref 136–145)
WBC # BLD AUTO: 6.47 THOUSAND/UL (ref 4.31–10.16)

## 2020-05-03 PROCEDURE — 85025 COMPLETE CBC W/AUTO DIFF WBC: CPT | Performed by: PHYSICIAN ASSISTANT

## 2020-05-03 PROCEDURE — 99232 SBSQ HOSP IP/OBS MODERATE 35: CPT | Performed by: SURGERY

## 2020-05-03 PROCEDURE — 99024 POSTOP FOLLOW-UP VISIT: CPT | Performed by: PHYSICIAN ASSISTANT

## 2020-05-03 PROCEDURE — 85730 THROMBOPLASTIN TIME PARTIAL: CPT | Performed by: PHYSICIAN ASSISTANT

## 2020-05-03 PROCEDURE — 83036 HEMOGLOBIN GLYCOSYLATED A1C: CPT | Performed by: PHYSICIAN ASSISTANT

## 2020-05-03 PROCEDURE — 80048 BASIC METABOLIC PNL TOTAL CA: CPT | Performed by: PHYSICIAN ASSISTANT

## 2020-05-03 PROCEDURE — 85610 PROTHROMBIN TIME: CPT | Performed by: PHYSICIAN ASSISTANT

## 2020-05-03 RX ORDER — CHLORHEXIDINE GLUCONATE 0.12 MG/ML
15 RINSE ORAL ONCE
Status: CANCELLED | OUTPATIENT
Start: 2020-05-04

## 2020-05-03 RX ORDER — CEFAZOLIN SODIUM 1 G/50ML
1000 SOLUTION INTRAVENOUS ONCE
Status: CANCELLED | OUTPATIENT
Start: 2020-05-04

## 2020-05-03 RX ADMIN — SENNOSIDES AND DOCUSATE SODIUM 1 TABLET: 8.6; 5 TABLET ORAL at 17:57

## 2020-05-03 RX ADMIN — BACITRACIN 1 SMALL APPLICATION: 500 OINTMENT TOPICAL at 17:57

## 2020-05-03 RX ADMIN — AMOXICILLIN AND CLAVULANATE POTASSIUM 1 TABLET: 875; 125 TABLET, FILM COATED ORAL at 08:00

## 2020-05-03 RX ADMIN — LIDOCAINE 1 PATCH: 50 PATCH TOPICAL at 08:02

## 2020-05-03 RX ADMIN — ENOXAPARIN SODIUM 30 MG: 30 INJECTION SUBCUTANEOUS at 08:00

## 2020-05-03 RX ADMIN — SENNOSIDES AND DOCUSATE SODIUM 1 TABLET: 8.6; 5 TABLET ORAL at 08:01

## 2020-05-03 RX ADMIN — NICOTINE 1 PATCH: 7 PATCH TRANSDERMAL at 08:01

## 2020-05-03 RX ADMIN — ACETAMINOPHEN 975 MG: 325 TABLET ORAL at 05:10

## 2020-05-03 RX ADMIN — BACITRACIN 1 SMALL APPLICATION: 500 OINTMENT TOPICAL at 08:01

## 2020-05-03 RX ADMIN — ACETAMINOPHEN 975 MG: 325 TABLET ORAL at 14:30

## 2020-05-03 RX ADMIN — ACETAMINOPHEN 975 MG: 325 TABLET ORAL at 21:34

## 2020-05-03 RX ADMIN — MELATONIN 3 MG: at 21:34

## 2020-05-03 RX ADMIN — AMOXICILLIN AND CLAVULANATE POTASSIUM 1 TABLET: 875; 125 TABLET, FILM COATED ORAL at 21:33

## 2020-05-04 ENCOUNTER — ANESTHESIA (INPATIENT)
Dept: PERIOP | Facility: HOSPITAL | Age: 85
DRG: 518 | End: 2020-05-04
Payer: MEDICARE

## 2020-05-04 ENCOUNTER — ANESTHESIA EVENT (INPATIENT)
Dept: PERIOP | Facility: HOSPITAL | Age: 85
DRG: 518 | End: 2020-05-04
Payer: MEDICARE

## 2020-05-04 ENCOUNTER — APPOINTMENT (INPATIENT)
Dept: RADIOLOGY | Facility: HOSPITAL | Age: 85
DRG: 518 | End: 2020-05-04
Payer: MEDICARE

## 2020-05-04 LAB
ABO GROUP BLD: NORMAL
ABO GROUP BLD: NORMAL
ANION GAP SERPL CALCULATED.3IONS-SCNC: 4 MMOL/L (ref 4–13)
BASOPHILS # BLD AUTO: 0.06 THOUSANDS/ΜL (ref 0–0.1)
BASOPHILS NFR BLD AUTO: 1 % (ref 0–1)
BLD GP AB SCN SERPL QL: NEGATIVE
BUN SERPL-MCNC: 17 MG/DL (ref 5–25)
CALCIUM SERPL-MCNC: 9.2 MG/DL (ref 8.3–10.1)
CHLORIDE SERPL-SCNC: 109 MMOL/L (ref 100–108)
CO2 SERPL-SCNC: 24 MMOL/L (ref 21–32)
CREAT SERPL-MCNC: 0.77 MG/DL (ref 0.6–1.3)
EOSINOPHIL # BLD AUTO: 0.09 THOUSAND/ΜL (ref 0–0.61)
EOSINOPHIL NFR BLD AUTO: 1 % (ref 0–6)
ERYTHROCYTE [DISTWIDTH] IN BLOOD BY AUTOMATED COUNT: 14.5 % (ref 11.6–15.1)
GFR SERPL CREATININE-BSD FRML MDRD: 83 ML/MIN/1.73SQ M
GLUCOSE SERPL-MCNC: 90 MG/DL (ref 65–140)
HCT VFR BLD AUTO: 29.8 % (ref 36.5–49.3)
HGB BLD-MCNC: 9.7 G/DL (ref 12–17)
IMM GRANULOCYTES # BLD AUTO: 0.06 THOUSAND/UL (ref 0–0.2)
IMM GRANULOCYTES NFR BLD AUTO: 1 % (ref 0–2)
LYMPHOCYTES # BLD AUTO: 1.01 THOUSANDS/ΜL (ref 0.6–4.47)
LYMPHOCYTES NFR BLD AUTO: 16 % (ref 14–44)
MCH RBC QN AUTO: 32.2 PG (ref 26.8–34.3)
MCHC RBC AUTO-ENTMCNC: 32.6 G/DL (ref 31.4–37.4)
MCV RBC AUTO: 99 FL (ref 82–98)
MONOCYTES # BLD AUTO: 0.66 THOUSAND/ΜL (ref 0.17–1.22)
MONOCYTES NFR BLD AUTO: 10 % (ref 4–12)
NEUTROPHILS # BLD AUTO: 4.56 THOUSANDS/ΜL (ref 1.85–7.62)
NEUTS SEG NFR BLD AUTO: 71 % (ref 43–75)
NRBC BLD AUTO-RTO: 0 /100 WBCS
PLATELET # BLD AUTO: 227 THOUSANDS/UL (ref 149–390)
PLATELET # BLD AUTO: 230 THOUSANDS/UL (ref 149–390)
PMV BLD AUTO: 10.3 FL (ref 8.9–12.7)
PMV BLD AUTO: 9.3 FL (ref 8.9–12.7)
POTASSIUM SERPL-SCNC: 4 MMOL/L (ref 3.5–5.3)
RBC # BLD AUTO: 3.01 MILLION/UL (ref 3.88–5.62)
RH BLD: POSITIVE
RH BLD: POSITIVE
SODIUM SERPL-SCNC: 137 MMOL/L (ref 136–145)
SPECIMEN EXPIRATION DATE: NORMAL
WBC # BLD AUTO: 6.44 THOUSAND/UL (ref 4.31–10.16)

## 2020-05-04 PROCEDURE — 85025 COMPLETE CBC W/AUTO DIFF WBC: CPT | Performed by: NURSE PRACTITIONER

## 2020-05-04 PROCEDURE — C1769 GUIDE WIRE: HCPCS | Performed by: NEUROLOGICAL SURGERY

## 2020-05-04 PROCEDURE — 86901 BLOOD TYPING SEROLOGIC RH(D): CPT | Performed by: NURSE PRACTITIONER

## 2020-05-04 PROCEDURE — C1713 ANCHOR/SCREW BN/BN,TIS/BN: HCPCS | Performed by: NEUROLOGICAL SURGERY

## 2020-05-04 PROCEDURE — 99232 SBSQ HOSP IP/OBS MODERATE 35: CPT | Performed by: INTERNAL MEDICINE

## 2020-05-04 PROCEDURE — 22318 TREAT ODONTOID FX W/O GRAFT: CPT | Performed by: NEUROLOGICAL SURGERY

## 2020-05-04 PROCEDURE — 72040 X-RAY EXAM NECK SPINE 2-3 VW: CPT

## 2020-05-04 PROCEDURE — 0PS304Z REPOSITION CERVICAL VERTEBRA WITH INTERNAL FIXATION DEVICE, OPEN APPROACH: ICD-10-PCS | Performed by: NEUROLOGICAL SURGERY

## 2020-05-04 PROCEDURE — 86850 RBC ANTIBODY SCREEN: CPT | Performed by: NURSE PRACTITIONER

## 2020-05-04 PROCEDURE — 99232 SBSQ HOSP IP/OBS MODERATE 35: CPT | Performed by: SURGERY

## 2020-05-04 PROCEDURE — 86900 BLOOD TYPING SEROLOGIC ABO: CPT | Performed by: NURSE PRACTITIONER

## 2020-05-04 PROCEDURE — 80048 BASIC METABOLIC PNL TOTAL CA: CPT | Performed by: NURSE PRACTITIONER

## 2020-05-04 PROCEDURE — 99024 POSTOP FOLLOW-UP VISIT: CPT | Performed by: NEUROLOGICAL SURGERY

## 2020-05-04 PROCEDURE — 85049 AUTOMATED PLATELET COUNT: CPT | Performed by: NEUROLOGICAL SURGERY

## 2020-05-04 DEVICE — UCS 4.0X32 X873-132 CANN LAG SCR TI
Type: IMPLANTABLE DEVICE | Site: SPINE CERVICAL | Status: FUNCTIONAL
Brand: TOWNLEY™ TRANSFACETPEDICULAR SCREW FIXATION SYSTEM

## 2020-05-04 DEVICE — BONE SCREW 873-136 4.0 CAN CORT LAG L 36
Type: IMPLANTABLE DEVICE | Site: SPINE CERVICAL | Status: FUNCTIONAL
Brand: UCSS® SCREW SET

## 2020-05-04 RX ORDER — SODIUM CHLORIDE, SODIUM LACTATE, POTASSIUM CHLORIDE, CALCIUM CHLORIDE 600; 310; 30; 20 MG/100ML; MG/100ML; MG/100ML; MG/100ML
100 INJECTION, SOLUTION INTRAVENOUS CONTINUOUS
Status: DISPENSED | OUTPATIENT
Start: 2020-05-04 | End: 2020-05-04

## 2020-05-04 RX ORDER — EPHEDRINE SULFATE 50 MG/ML
INJECTION INTRAVENOUS AS NEEDED
Status: DISCONTINUED | OUTPATIENT
Start: 2020-05-04 | End: 2020-05-04 | Stop reason: SURG

## 2020-05-04 RX ORDER — CHLORHEXIDINE GLUCONATE 0.12 MG/ML
15 RINSE ORAL ONCE
Status: COMPLETED | OUTPATIENT
Start: 2020-05-04 | End: 2020-05-04

## 2020-05-04 RX ORDER — LIDOCAINE HYDROCHLORIDE 10 MG/ML
INJECTION, SOLUTION EPIDURAL; INFILTRATION; INTRACAUDAL; PERINEURAL AS NEEDED
Status: DISCONTINUED | OUTPATIENT
Start: 2020-05-04 | End: 2020-05-04 | Stop reason: HOSPADM

## 2020-05-04 RX ORDER — HYDROMORPHONE HCL/PF 1 MG/ML
0.5 SYRINGE (ML) INJECTION EVERY 4 HOURS PRN
Status: DISCONTINUED | OUTPATIENT
Start: 2020-05-04 | End: 2020-05-06 | Stop reason: HOSPADM

## 2020-05-04 RX ORDER — ROCURONIUM BROMIDE 10 MG/ML
INJECTION, SOLUTION INTRAVENOUS AS NEEDED
Status: DISCONTINUED | OUTPATIENT
Start: 2020-05-04 | End: 2020-05-04 | Stop reason: SURG

## 2020-05-04 RX ORDER — OXYCODONE HYDROCHLORIDE 5 MG/1
10 TABLET ORAL EVERY 4 HOURS PRN
Status: DISCONTINUED | OUTPATIENT
Start: 2020-05-04 | End: 2020-05-05

## 2020-05-04 RX ORDER — SODIUM CHLORIDE 9 MG/ML
INJECTION, SOLUTION INTRAVENOUS CONTINUOUS PRN
Status: DISCONTINUED | OUTPATIENT
Start: 2020-05-04 | End: 2020-05-04 | Stop reason: SURG

## 2020-05-04 RX ORDER — SODIUM CHLORIDE 9 MG/ML
75 INJECTION, SOLUTION INTRAVENOUS CONTINUOUS
Status: DISCONTINUED | OUTPATIENT
Start: 2020-05-04 | End: 2020-05-05

## 2020-05-04 RX ORDER — ACETAMINOPHEN 325 MG/1
650 TABLET ORAL EVERY 4 HOURS PRN
Status: DISCONTINUED | OUTPATIENT
Start: 2020-05-04 | End: 2020-05-06 | Stop reason: HOSPADM

## 2020-05-04 RX ORDER — BISACODYL 10 MG
10 SUPPOSITORY, RECTAL RECTAL DAILY PRN
Status: DISCONTINUED | OUTPATIENT
Start: 2020-05-04 | End: 2020-05-06 | Stop reason: HOSPADM

## 2020-05-04 RX ORDER — PROPOFOL 10 MG/ML
INJECTION, EMULSION INTRAVENOUS AS NEEDED
Status: DISCONTINUED | OUTPATIENT
Start: 2020-05-04 | End: 2020-05-04 | Stop reason: SURG

## 2020-05-04 RX ORDER — DEXAMETHASONE SODIUM PHOSPHATE 10 MG/ML
INJECTION, SOLUTION INTRAMUSCULAR; INTRAVENOUS AS NEEDED
Status: DISCONTINUED | OUTPATIENT
Start: 2020-05-04 | End: 2020-05-04 | Stop reason: SURG

## 2020-05-04 RX ORDER — BUPIVACAINE HYDROCHLORIDE AND EPINEPHRINE 5; 5 MG/ML; UG/ML
INJECTION, SOLUTION EPIDURAL; INTRACAUDAL; PERINEURAL AS NEEDED
Status: DISCONTINUED | OUTPATIENT
Start: 2020-05-04 | End: 2020-05-04 | Stop reason: HOSPADM

## 2020-05-04 RX ORDER — SODIUM CHLORIDE, SODIUM LACTATE, POTASSIUM CHLORIDE, CALCIUM CHLORIDE 600; 310; 30; 20 MG/100ML; MG/100ML; MG/100ML; MG/100ML
INJECTION, SOLUTION INTRAVENOUS CONTINUOUS PRN
Status: DISCONTINUED | OUTPATIENT
Start: 2020-05-04 | End: 2020-05-04

## 2020-05-04 RX ORDER — SODIUM CHLORIDE, SODIUM LACTATE, POTASSIUM CHLORIDE, CALCIUM CHLORIDE 600; 310; 30; 20 MG/100ML; MG/100ML; MG/100ML; MG/100ML
125 INJECTION, SOLUTION INTRAVENOUS CONTINUOUS
Status: DISCONTINUED | OUTPATIENT
Start: 2020-05-04 | End: 2020-05-05

## 2020-05-04 RX ORDER — SUCCINYLCHOLINE/SOD CL,ISO/PF 100 MG/5ML
SYRINGE (ML) INTRAVENOUS AS NEEDED
Status: DISCONTINUED | OUTPATIENT
Start: 2020-05-04 | End: 2020-05-04 | Stop reason: SURG

## 2020-05-04 RX ORDER — CEFAZOLIN SODIUM 2 G/50ML
2000 SOLUTION INTRAVENOUS ONCE
Status: COMPLETED | OUTPATIENT
Start: 2020-05-04 | End: 2020-05-04

## 2020-05-04 RX ORDER — LIDOCAINE HYDROCHLORIDE 10 MG/ML
INJECTION, SOLUTION EPIDURAL; INFILTRATION; INTRACAUDAL; PERINEURAL AS NEEDED
Status: DISCONTINUED | OUTPATIENT
Start: 2020-05-04 | End: 2020-05-04 | Stop reason: SURG

## 2020-05-04 RX ORDER — ONDANSETRON 2 MG/ML
INJECTION INTRAMUSCULAR; INTRAVENOUS AS NEEDED
Status: DISCONTINUED | OUTPATIENT
Start: 2020-05-04 | End: 2020-05-04 | Stop reason: SURG

## 2020-05-04 RX ORDER — GLYCOPYRROLATE 0.2 MG/ML
INJECTION INTRAMUSCULAR; INTRAVENOUS AS NEEDED
Status: DISCONTINUED | OUTPATIENT
Start: 2020-05-04 | End: 2020-05-04 | Stop reason: SURG

## 2020-05-04 RX ORDER — HEPARIN SODIUM 5000 [USP'U]/ML
5000 INJECTION, SOLUTION INTRAVENOUS; SUBCUTANEOUS EVERY 8 HOURS SCHEDULED
Status: DISCONTINUED | OUTPATIENT
Start: 2020-05-05 | End: 2020-05-06 | Stop reason: HOSPADM

## 2020-05-04 RX ORDER — LIDOCAINE HYDROCHLORIDE AND EPINEPHRINE 10; 10 MG/ML; UG/ML
INJECTION, SOLUTION INFILTRATION; PERINEURAL AS NEEDED
Status: DISCONTINUED | OUTPATIENT
Start: 2020-05-04 | End: 2020-05-04 | Stop reason: HOSPADM

## 2020-05-04 RX ORDER — FENTANYL CITRATE/PF 50 MCG/ML
25 SYRINGE (ML) INJECTION
Status: DISCONTINUED | OUTPATIENT
Start: 2020-05-04 | End: 2020-05-04 | Stop reason: HOSPADM

## 2020-05-04 RX ORDER — FENTANYL CITRATE 50 UG/ML
INJECTION, SOLUTION INTRAMUSCULAR; INTRAVENOUS AS NEEDED
Status: DISCONTINUED | OUTPATIENT
Start: 2020-05-04 | End: 2020-05-04 | Stop reason: SURG

## 2020-05-04 RX ORDER — ONDANSETRON 2 MG/ML
4 INJECTION INTRAMUSCULAR; INTRAVENOUS ONCE AS NEEDED
Status: DISCONTINUED | OUTPATIENT
Start: 2020-05-04 | End: 2020-05-04 | Stop reason: HOSPADM

## 2020-05-04 RX ORDER — BISACODYL 10 MG
10 SUPPOSITORY, RECTAL RECTAL DAILY PRN
Status: DISCONTINUED | OUTPATIENT
Start: 2020-05-04 | End: 2020-05-04

## 2020-05-04 RX ORDER — OXYCODONE HYDROCHLORIDE 5 MG/1
5 TABLET ORAL EVERY 4 HOURS PRN
Status: DISCONTINUED | OUTPATIENT
Start: 2020-05-04 | End: 2020-05-05

## 2020-05-04 RX ORDER — DOCUSATE SODIUM 100 MG/1
100 CAPSULE, LIQUID FILLED ORAL 2 TIMES DAILY
Status: DISCONTINUED | OUTPATIENT
Start: 2020-05-04 | End: 2020-05-06 | Stop reason: HOSPADM

## 2020-05-04 RX ORDER — NEOSTIGMINE METHYLSULFATE 1 MG/ML
INJECTION INTRAVENOUS AS NEEDED
Status: DISCONTINUED | OUTPATIENT
Start: 2020-05-04 | End: 2020-05-04 | Stop reason: SURG

## 2020-05-04 RX ADMIN — ACETAMINOPHEN 975 MG: 325 TABLET ORAL at 06:37

## 2020-05-04 RX ADMIN — DOCUSATE SODIUM 100 MG: 100 CAPSULE, LIQUID FILLED ORAL at 17:30

## 2020-05-04 RX ADMIN — SENNOSIDES AND DOCUSATE SODIUM 1 TABLET: 8.6; 5 TABLET ORAL at 17:30

## 2020-05-04 RX ADMIN — BACITRACIN 1 SMALL APPLICATION: 500 OINTMENT TOPICAL at 08:14

## 2020-05-04 RX ADMIN — AMOXICILLIN AND CLAVULANATE POTASSIUM 1 TABLET: 875; 125 TABLET, FILM COATED ORAL at 21:32

## 2020-05-04 RX ADMIN — PHENYLEPHRINE HYDROCHLORIDE 50 MCG: 10 INJECTION INTRAVENOUS at 10:08

## 2020-05-04 RX ADMIN — FENTANYL CITRATE 50 MCG: 50 INJECTION, SOLUTION INTRAMUSCULAR; INTRAVENOUS at 10:20

## 2020-05-04 RX ADMIN — PROPOFOL 50 MG: 10 INJECTION, EMULSION INTRAVENOUS at 10:22

## 2020-05-04 RX ADMIN — PROPOFOL 100 MG: 10 INJECTION, EMULSION INTRAVENOUS at 10:09

## 2020-05-04 RX ADMIN — CHLORHEXIDINE GLUCONATE 0.12% ORAL RINSE 15 ML: 1.2 LIQUID ORAL at 09:04

## 2020-05-04 RX ADMIN — FENTANYL CITRATE 25 MCG: 50 INJECTION, SOLUTION INTRAMUSCULAR; INTRAVENOUS at 10:06

## 2020-05-04 RX ADMIN — SODIUM CHLORIDE, SODIUM LACTATE, POTASSIUM CHLORIDE, AND CALCIUM CHLORIDE 125 ML/HR: .6; .31; .03; .02 INJECTION, SOLUTION INTRAVENOUS at 00:52

## 2020-05-04 RX ADMIN — LIDOCAINE 1 PATCH: 50 PATCH TOPICAL at 17:31

## 2020-05-04 RX ADMIN — ONDANSETRON 4 MG: 2 INJECTION INTRAMUSCULAR; INTRAVENOUS at 10:20

## 2020-05-04 RX ADMIN — NEOSTIGMINE METHYLSULFATE 3 MG: 1 INJECTION, SOLUTION INTRAVENOUS at 12:00

## 2020-05-04 RX ADMIN — MELATONIN 3 MG: at 21:32

## 2020-05-04 RX ADMIN — SODIUM CHLORIDE, SODIUM LACTATE, POTASSIUM CHLORIDE, AND CALCIUM CHLORIDE 125 ML/HR: .6; .31; .03; .02 INJECTION, SOLUTION INTRAVENOUS at 12:37

## 2020-05-04 RX ADMIN — LIDOCAINE HYDROCHLORIDE 50 MG: 10 INJECTION, SOLUTION EPIDURAL; INFILTRATION; INTRACAUDAL; PERINEURAL at 10:09

## 2020-05-04 RX ADMIN — DEXAMETHASONE SODIUM PHOSPHATE 10 MG: 10 INJECTION, SOLUTION INTRAMUSCULAR; INTRAVENOUS at 10:20

## 2020-05-04 RX ADMIN — GLYCOPYRROLATE 0.5 MG: 0.2 INJECTION, SOLUTION INTRAMUSCULAR; INTRAVENOUS at 12:00

## 2020-05-04 RX ADMIN — EPHEDRINE SULFATE 10 MG: 50 INJECTION, SOLUTION INTRAVENOUS at 10:36

## 2020-05-04 RX ADMIN — ACETAMINOPHEN 975 MG: 325 TABLET ORAL at 21:32

## 2020-05-04 RX ADMIN — SODIUM CHLORIDE: 9 INJECTION, SOLUTION INTRAVENOUS at 10:12

## 2020-05-04 RX ADMIN — PHENYLEPHRINE HYDROCHLORIDE 300 MCG: 10 INJECTION INTRAVENOUS at 10:33

## 2020-05-04 RX ADMIN — SODIUM CHLORIDE 75 ML/HR: 0.9 INJECTION, SOLUTION INTRAVENOUS at 14:33

## 2020-05-04 RX ADMIN — AMOXICILLIN AND CLAVULANATE POTASSIUM 1 TABLET: 875; 125 TABLET, FILM COATED ORAL at 08:14

## 2020-05-04 RX ADMIN — PHENYLEPHRINE HYDROCHLORIDE 300 MCG: 10 INJECTION INTRAVENOUS at 10:35

## 2020-05-04 RX ADMIN — ROCURONIUM BROMIDE 50 MG: 10 INJECTION, SOLUTION INTRAVENOUS at 10:20

## 2020-05-04 RX ADMIN — REMIFENTANIL HYDROCHLORIDE 0.12 MCG/KG/MIN: 1 INJECTION, POWDER, LYOPHILIZED, FOR SOLUTION INTRAVENOUS at 10:18

## 2020-05-04 RX ADMIN — Medication 100 MG: at 10:09

## 2020-05-04 RX ADMIN — SODIUM CHLORIDE, SODIUM LACTATE, POTASSIUM CHLORIDE, AND CALCIUM CHLORIDE 125 ML/HR: .6; .31; .03; .02 INJECTION, SOLUTION INTRAVENOUS at 09:01

## 2020-05-04 RX ADMIN — BACITRACIN 1 SMALL APPLICATION: 500 OINTMENT TOPICAL at 17:32

## 2020-05-04 RX ADMIN — CEFAZOLIN SODIUM 2000 MG: 2 SOLUTION INTRAVENOUS at 10:40

## 2020-05-04 RX ADMIN — PHENYLEPHRINE HYDROCHLORIDE 50 MCG/MIN: 10 INJECTION INTRAVENOUS at 10:33

## 2020-05-05 ENCOUNTER — APPOINTMENT (INPATIENT)
Dept: RADIOLOGY | Facility: HOSPITAL | Age: 85
DRG: 518 | End: 2020-05-05
Attending: NEUROLOGICAL SURGERY
Payer: MEDICARE

## 2020-05-05 LAB
ANION GAP SERPL CALCULATED.3IONS-SCNC: 4 MMOL/L (ref 4–13)
BUN SERPL-MCNC: 15 MG/DL (ref 5–25)
CALCIUM SERPL-MCNC: 9.1 MG/DL (ref 8.3–10.1)
CHLORIDE SERPL-SCNC: 109 MMOL/L (ref 100–108)
CO2 SERPL-SCNC: 25 MMOL/L (ref 21–32)
CREAT SERPL-MCNC: 0.94 MG/DL (ref 0.6–1.3)
ERYTHROCYTE [DISTWIDTH] IN BLOOD BY AUTOMATED COUNT: 14.1 % (ref 11.6–15.1)
GFR SERPL CREATININE-BSD FRML MDRD: 74 ML/MIN/1.73SQ M
GLUCOSE SERPL-MCNC: 84 MG/DL (ref 65–140)
HCT VFR BLD AUTO: 27.8 % (ref 36.5–49.3)
HGB BLD-MCNC: 9.2 G/DL (ref 12–17)
MCH RBC QN AUTO: 32.9 PG (ref 26.8–34.3)
MCHC RBC AUTO-ENTMCNC: 33.1 G/DL (ref 31.4–37.4)
MCV RBC AUTO: 99 FL (ref 82–98)
PLATELET # BLD AUTO: 240 THOUSANDS/UL (ref 149–390)
PMV BLD AUTO: 10.1 FL (ref 8.9–12.7)
POTASSIUM SERPL-SCNC: 4.1 MMOL/L (ref 3.5–5.3)
RBC # BLD AUTO: 2.8 MILLION/UL (ref 3.88–5.62)
SODIUM SERPL-SCNC: 138 MMOL/L (ref 136–145)
WBC # BLD AUTO: 11.87 THOUSAND/UL (ref 4.31–10.16)

## 2020-05-05 PROCEDURE — 99232 SBSQ HOSP IP/OBS MODERATE 35: CPT | Performed by: INTERNAL MEDICINE

## 2020-05-05 PROCEDURE — 85027 COMPLETE CBC AUTOMATED: CPT | Performed by: NEUROLOGICAL SURGERY

## 2020-05-05 PROCEDURE — 97530 THERAPEUTIC ACTIVITIES: CPT

## 2020-05-05 PROCEDURE — 99232 SBSQ HOSP IP/OBS MODERATE 35: CPT | Performed by: SURGERY

## 2020-05-05 PROCEDURE — 92610 EVALUATE SWALLOWING FUNCTION: CPT

## 2020-05-05 PROCEDURE — 97168 OT RE-EVAL EST PLAN CARE: CPT

## 2020-05-05 PROCEDURE — 80048 BASIC METABOLIC PNL TOTAL CA: CPT | Performed by: NEUROLOGICAL SURGERY

## 2020-05-05 PROCEDURE — 99024 POSTOP FOLLOW-UP VISIT: CPT | Performed by: PHYSICIAN ASSISTANT

## 2020-05-05 PROCEDURE — 97164 PT RE-EVAL EST PLAN CARE: CPT

## 2020-05-05 PROCEDURE — 72040 X-RAY EXAM NECK SPINE 2-3 VW: CPT

## 2020-05-05 PROCEDURE — 97116 GAIT TRAINING THERAPY: CPT

## 2020-05-05 RX ORDER — OXYCODONE HYDROCHLORIDE 5 MG/1
2.5 TABLET ORAL EVERY 4 HOURS PRN
Status: DISCONTINUED | OUTPATIENT
Start: 2020-05-05 | End: 2020-05-06 | Stop reason: HOSPADM

## 2020-05-05 RX ORDER — OXYCODONE HYDROCHLORIDE 5 MG/1
5 TABLET ORAL EVERY 4 HOURS PRN
Status: DISCONTINUED | OUTPATIENT
Start: 2020-05-05 | End: 2020-05-06 | Stop reason: HOSPADM

## 2020-05-05 RX ORDER — OLANZAPINE 10 MG/1
2.5 INJECTION, POWDER, LYOPHILIZED, FOR SOLUTION INTRAMUSCULAR
Status: DISCONTINUED | OUTPATIENT
Start: 2020-05-05 | End: 2020-05-06 | Stop reason: HOSPADM

## 2020-05-05 RX ORDER — OLANZAPINE 10 MG/1
2.5 INJECTION, POWDER, LYOPHILIZED, FOR SOLUTION INTRAMUSCULAR ONCE
Status: COMPLETED | OUTPATIENT
Start: 2020-05-05 | End: 2020-05-05

## 2020-05-05 RX ADMIN — NICOTINE 1 PATCH: 7 PATCH TRANSDERMAL at 09:05

## 2020-05-05 RX ADMIN — WATER: 1 INJECTION INTRAMUSCULAR; INTRAVENOUS; SUBCUTANEOUS at 17:19

## 2020-05-05 RX ADMIN — OLANZAPINE 2.5 MG: 10 INJECTION, POWDER, FOR SOLUTION INTRAMUSCULAR at 17:19

## 2020-05-05 RX ADMIN — OXYCODONE HYDROCHLORIDE 2.5 MG: 5 TABLET ORAL at 16:40

## 2020-05-05 RX ADMIN — BACITRACIN 1 SMALL APPLICATION: 500 OINTMENT TOPICAL at 09:05

## 2020-05-05 RX ADMIN — DOCUSATE SODIUM 100 MG: 100 CAPSULE, LIQUID FILLED ORAL at 09:04

## 2020-05-05 RX ADMIN — ACETAMINOPHEN 975 MG: 325 TABLET ORAL at 14:40

## 2020-05-05 RX ADMIN — SODIUM CHLORIDE 75 ML/HR: 0.9 INJECTION, SOLUTION INTRAVENOUS at 03:58

## 2020-05-05 RX ADMIN — OLANZAPINE 2.5 MG: 10 INJECTION, POWDER, FOR SOLUTION INTRAMUSCULAR at 22:29

## 2020-05-05 RX ADMIN — SENNOSIDES AND DOCUSATE SODIUM 1 TABLET: 8.6; 5 TABLET ORAL at 09:05

## 2020-05-05 RX ADMIN — AMOXICILLIN AND CLAVULANATE POTASSIUM 1 TABLET: 875; 125 TABLET, FILM COATED ORAL at 09:04

## 2020-05-05 RX ADMIN — HEPARIN SODIUM 5000 UNITS: 5000 INJECTION INTRAVENOUS; SUBCUTANEOUS at 14:40

## 2020-05-06 ENCOUNTER — APPOINTMENT (INPATIENT)
Dept: RADIOLOGY | Facility: HOSPITAL | Age: 85
DRG: 518 | End: 2020-05-06
Payer: MEDICARE

## 2020-05-06 VITALS
WEIGHT: 137.9 LBS | HEIGHT: 66 IN | BODY MASS INDEX: 22.16 KG/M2 | HEART RATE: 90 BPM | TEMPERATURE: 97.7 F | OXYGEN SATURATION: 95 % | DIASTOLIC BLOOD PRESSURE: 78 MMHG | SYSTOLIC BLOOD PRESSURE: 158 MMHG | RESPIRATION RATE: 16 BRPM

## 2020-05-06 LAB
ANION GAP SERPL CALCULATED.3IONS-SCNC: 4 MMOL/L (ref 4–13)
BACTERIA UR QL AUTO: NORMAL /HPF
BASOPHILS # BLD AUTO: 0.06 THOUSANDS/ΜL (ref 0–0.1)
BASOPHILS NFR BLD AUTO: 1 % (ref 0–1)
BILIRUB UR QL STRIP: NEGATIVE
BUN SERPL-MCNC: 14 MG/DL (ref 5–25)
CALCIUM SERPL-MCNC: 9.5 MG/DL (ref 8.3–10.1)
CHLORIDE SERPL-SCNC: 109 MMOL/L (ref 100–108)
CLARITY UR: CLEAR
CO2 SERPL-SCNC: 28 MMOL/L (ref 21–32)
COLOR UR: YELLOW
CREAT SERPL-MCNC: 0.8 MG/DL (ref 0.6–1.3)
EOSINOPHIL # BLD AUTO: 0.06 THOUSAND/ΜL (ref 0–0.61)
EOSINOPHIL NFR BLD AUTO: 1 % (ref 0–6)
ERYTHROCYTE [DISTWIDTH] IN BLOOD BY AUTOMATED COUNT: 14.6 % (ref 11.6–15.1)
GFR SERPL CREATININE-BSD FRML MDRD: 82 ML/MIN/1.73SQ M
GLUCOSE SERPL-MCNC: 89 MG/DL (ref 65–140)
GLUCOSE UR STRIP-MCNC: NEGATIVE MG/DL
HCT VFR BLD AUTO: 31.3 % (ref 36.5–49.3)
HGB BLD-MCNC: 10.2 G/DL (ref 12–17)
HGB UR QL STRIP.AUTO: NEGATIVE
HYALINE CASTS #/AREA URNS LPF: NORMAL /LPF
IMM GRANULOCYTES # BLD AUTO: 0.07 THOUSAND/UL (ref 0–0.2)
IMM GRANULOCYTES NFR BLD AUTO: 1 % (ref 0–2)
KETONES UR STRIP-MCNC: NEGATIVE MG/DL
LEUKOCYTE ESTERASE UR QL STRIP: NEGATIVE
LYMPHOCYTES # BLD AUTO: 1.05 THOUSANDS/ΜL (ref 0.6–4.47)
LYMPHOCYTES NFR BLD AUTO: 16 % (ref 14–44)
MCH RBC QN AUTO: 32.4 PG (ref 26.8–34.3)
MCHC RBC AUTO-ENTMCNC: 32.6 G/DL (ref 31.4–37.4)
MCV RBC AUTO: 99 FL (ref 82–98)
MONOCYTES # BLD AUTO: 0.64 THOUSAND/ΜL (ref 0.17–1.22)
MONOCYTES NFR BLD AUTO: 10 % (ref 4–12)
NEUTROPHILS # BLD AUTO: 4.59 THOUSANDS/ΜL (ref 1.85–7.62)
NEUTS SEG NFR BLD AUTO: 71 % (ref 43–75)
NITRITE UR QL STRIP: NEGATIVE
NON-SQ EPI CELLS URNS QL MICRO: NORMAL /HPF
NRBC BLD AUTO-RTO: 0 /100 WBCS
PH UR STRIP.AUTO: 7.5 [PH]
PLATELET # BLD AUTO: 297 THOUSANDS/UL (ref 149–390)
PMV BLD AUTO: 9.4 FL (ref 8.9–12.7)
POTASSIUM SERPL-SCNC: 4.1 MMOL/L (ref 3.5–5.3)
PROT UR STRIP-MCNC: NEGATIVE MG/DL
RBC # BLD AUTO: 3.15 MILLION/UL (ref 3.88–5.62)
RBC #/AREA URNS AUTO: NORMAL /HPF
SODIUM SERPL-SCNC: 141 MMOL/L (ref 136–145)
SP GR UR STRIP.AUTO: 1.01 (ref 1–1.03)
UROBILINOGEN UR QL STRIP.AUTO: 1 E.U./DL
WBC # BLD AUTO: 6.47 THOUSAND/UL (ref 4.31–10.16)
WBC #/AREA URNS AUTO: NORMAL /HPF

## 2020-05-06 PROCEDURE — NC001 PR NO CHARGE: Performed by: PHYSICIAN ASSISTANT

## 2020-05-06 PROCEDURE — 80048 BASIC METABOLIC PNL TOTAL CA: CPT | Performed by: PHYSICIAN ASSISTANT

## 2020-05-06 PROCEDURE — 81001 URINALYSIS AUTO W/SCOPE: CPT | Performed by: PHYSICIAN ASSISTANT

## 2020-05-06 PROCEDURE — 99238 HOSP IP/OBS DSCHRG MGMT 30/<: CPT | Performed by: PHYSICIAN ASSISTANT

## 2020-05-06 PROCEDURE — 99233 SBSQ HOSP IP/OBS HIGH 50: CPT | Performed by: INTERNAL MEDICINE

## 2020-05-06 PROCEDURE — 85025 COMPLETE CBC W/AUTO DIFF WBC: CPT | Performed by: PHYSICIAN ASSISTANT

## 2020-05-06 PROCEDURE — 71046 X-RAY EXAM CHEST 2 VIEWS: CPT

## 2020-05-06 PROCEDURE — 99024 POSTOP FOLLOW-UP VISIT: CPT | Performed by: NEUROLOGICAL SURGERY

## 2020-05-06 PROCEDURE — 92526 ORAL FUNCTION THERAPY: CPT

## 2020-05-06 PROCEDURE — 99232 SBSQ HOSP IP/OBS MODERATE 35: CPT | Performed by: SURGERY

## 2020-05-06 PROCEDURE — 70450 CT HEAD/BRAIN W/O DYE: CPT

## 2020-05-06 RX ORDER — AMOXICILLIN AND CLAVULANATE POTASSIUM 875; 125 MG/1; MG/1
1 TABLET, FILM COATED ORAL EVERY 12 HOURS SCHEDULED
Qty: 4 TABLET | Refills: 0 | Status: SHIPPED | OUTPATIENT
Start: 2020-05-06 | End: 2020-05-08

## 2020-05-06 RX ORDER — OLANZAPINE 10 MG/1
5 INJECTION, POWDER, LYOPHILIZED, FOR SOLUTION INTRAMUSCULAR ONCE
Status: COMPLETED | OUTPATIENT
Start: 2020-05-06 | End: 2020-05-06

## 2020-05-06 RX ORDER — OXYCODONE HYDROCHLORIDE 5 MG/1
2.5 TABLET ORAL EVERY 4 HOURS PRN
Qty: 20 TABLET | Refills: 0 | Status: SHIPPED | OUTPATIENT
Start: 2020-05-06 | End: 2020-05-16

## 2020-05-06 RX ORDER — ASPIRIN 81 MG/1
81 TABLET ORAL DAILY
Refills: 0 | Status: CANCELLED
Start: 2020-05-06

## 2020-05-06 RX ORDER — LANOLIN ALCOHOL/MO/W.PET/CERES
3 CREAM (GRAM) TOPICAL
Refills: 0 | Status: ON HOLD
Start: 2020-05-06 | End: 2022-04-22 | Stop reason: ALTCHOICE

## 2020-05-06 RX ADMIN — LIDOCAINE 1 PATCH: 50 PATCH TOPICAL at 09:08

## 2020-05-06 RX ADMIN — OXYCODONE HYDROCHLORIDE 5 MG: 5 TABLET ORAL at 02:59

## 2020-05-06 RX ADMIN — HEPARIN SODIUM 5000 UNITS: 5000 INJECTION INTRAVENOUS; SUBCUTANEOUS at 05:47

## 2020-05-06 RX ADMIN — HEPARIN SODIUM 5000 UNITS: 5000 INJECTION INTRAVENOUS; SUBCUTANEOUS at 14:53

## 2020-05-06 RX ADMIN — NICOTINE 1 PATCH: 7 PATCH TRANSDERMAL at 09:08

## 2020-05-06 RX ADMIN — AMOXICILLIN AND CLAVULANATE POTASSIUM 1 TABLET: 875; 125 TABLET, FILM COATED ORAL at 09:09

## 2020-05-06 RX ADMIN — OLANZAPINE 5 MG: 10 INJECTION, POWDER, FOR SOLUTION INTRAMUSCULAR at 00:44

## 2020-05-07 ENCOUNTER — TELEPHONE (OUTPATIENT)
Dept: NEUROSURGERY | Facility: CLINIC | Age: 85
End: 2020-05-07

## 2020-05-07 ENCOUNTER — TELEMEDICINE (OUTPATIENT)
Dept: NEUROSURGERY | Facility: CLINIC | Age: 85
End: 2020-05-07

## 2020-05-07 DIAGNOSIS — Z98.890 POST-OPERATIVE STATE: Primary | ICD-10-CM

## 2020-05-07 PROCEDURE — 99024 POSTOP FOLLOW-UP VISIT: CPT

## 2020-05-15 ENCOUNTER — TELEPHONE (OUTPATIENT)
Dept: NEUROSURGERY | Facility: CLINIC | Age: 85
End: 2020-05-15

## 2020-05-19 ENCOUNTER — TELEMEDICINE (OUTPATIENT)
Dept: NEUROSURGERY | Facility: CLINIC | Age: 85
End: 2020-05-19

## 2020-05-19 DIAGNOSIS — Z48.89 AFTERCARE FOLLOWING SURGERY FOR INJURY AND TRAUMA: Primary | ICD-10-CM

## 2020-05-19 PROCEDURE — 99024 POSTOP FOLLOW-UP VISIT: CPT

## 2020-06-16 ENCOUNTER — TELEPHONE (OUTPATIENT)
Dept: NEUROSURGERY | Facility: CLINIC | Age: 85
End: 2020-06-16

## 2022-04-20 ENCOUNTER — APPOINTMENT (EMERGENCY)
Dept: RADIOLOGY | Facility: HOSPITAL | Age: 87
DRG: 193 | End: 2022-04-20
Payer: MEDICARE

## 2022-04-20 ENCOUNTER — HOSPITAL ENCOUNTER (INPATIENT)
Facility: HOSPITAL | Age: 87
LOS: 2 days | Discharge: HOME HEALTH | DRG: 193 | End: 2022-04-22
Attending: EMERGENCY MEDICINE | Admitting: FAMILY MEDICINE
Payer: MEDICARE

## 2022-04-20 ENCOUNTER — APPOINTMENT (EMERGENCY)
Dept: CT IMAGING | Facility: HOSPITAL | Age: 87
DRG: 193 | End: 2022-04-20
Payer: MEDICARE

## 2022-04-20 DIAGNOSIS — G93.40 ACUTE ENCEPHALOPATHY: ICD-10-CM

## 2022-04-20 DIAGNOSIS — J18.9 CAP (COMMUNITY ACQUIRED PNEUMONIA): Primary | ICD-10-CM

## 2022-04-20 DIAGNOSIS — Z72.0 TOBACCO USE: ICD-10-CM

## 2022-04-20 PROBLEM — A41.9 SEPSIS (HCC): Status: ACTIVE | Noted: 2022-04-20

## 2022-04-20 PROBLEM — R41.82 AMS (ALTERED MENTAL STATUS): Status: ACTIVE | Noted: 2022-04-20

## 2022-04-20 LAB
2HR DELTA HS TROPONIN: 3 NG/L
4HR DELTA HS TROPONIN: 3 NG/L
ALBUMIN SERPL BCP-MCNC: 3.8 G/DL (ref 3.5–5)
ALP SERPL-CCNC: 120 U/L (ref 46–116)
ALT SERPL W P-5'-P-CCNC: 36 U/L (ref 12–78)
ANION GAP SERPL CALCULATED.3IONS-SCNC: 11 MMOL/L (ref 4–13)
APTT PPP: 26 SECONDS (ref 23–37)
AST SERPL W P-5'-P-CCNC: 29 U/L (ref 5–45)
BACTERIA UR QL AUTO: ABNORMAL /HPF
BASOPHILS # BLD AUTO: 0.04 THOUSANDS/ΜL (ref 0–0.1)
BASOPHILS NFR BLD AUTO: 0 % (ref 0–1)
BILIRUB DIRECT SERPL-MCNC: 0.16 MG/DL (ref 0–0.2)
BILIRUB SERPL-MCNC: 0.54 MG/DL (ref 0.2–1)
BILIRUB UR QL STRIP: NEGATIVE
BUN SERPL-MCNC: 25 MG/DL (ref 5–25)
CALCIUM SERPL-MCNC: 10.2 MG/DL (ref 8.3–10.1)
CARDIAC TROPONIN I PNL SERPL HS: 12 NG/L
CARDIAC TROPONIN I PNL SERPL HS: 12 NG/L
CARDIAC TROPONIN I PNL SERPL HS: 9 NG/L
CHLORIDE SERPL-SCNC: 104 MMOL/L (ref 100–108)
CLARITY UR: CLEAR
CO2 SERPL-SCNC: 22 MMOL/L (ref 21–32)
COLOR UR: YELLOW
CREAT SERPL-MCNC: 1.1 MG/DL (ref 0.6–1.3)
EOSINOPHIL # BLD AUTO: 0.03 THOUSAND/ΜL (ref 0–0.61)
EOSINOPHIL NFR BLD AUTO: 0 % (ref 0–6)
ERYTHROCYTE [DISTWIDTH] IN BLOOD BY AUTOMATED COUNT: 14.5 % (ref 11.6–15.1)
FLUAV RNA RESP QL NAA+PROBE: NEGATIVE
FLUBV RNA RESP QL NAA+PROBE: NEGATIVE
GFR SERPL CREATININE-BSD FRML MDRD: 60 ML/MIN/1.73SQ M
GLUCOSE SERPL-MCNC: 105 MG/DL (ref 65–140)
GLUCOSE UR STRIP-MCNC: NEGATIVE MG/DL
HCT VFR BLD AUTO: 39.4 % (ref 36.5–49.3)
HGB BLD-MCNC: 13.3 G/DL (ref 12–17)
HGB UR QL STRIP.AUTO: ABNORMAL
IMM GRANULOCYTES # BLD AUTO: 0.03 THOUSAND/UL (ref 0–0.2)
IMM GRANULOCYTES NFR BLD AUTO: 0 % (ref 0–2)
INR PPP: 1.06 (ref 0.84–1.19)
KETONES UR STRIP-MCNC: NEGATIVE MG/DL
LACTATE SERPL-SCNC: 1.3 MMOL/L (ref 0.5–2)
LEUKOCYTE ESTERASE UR QL STRIP: NEGATIVE
LYMPHOCYTES # BLD AUTO: 0.66 THOUSANDS/ΜL (ref 0.6–4.47)
LYMPHOCYTES NFR BLD AUTO: 7 % (ref 14–44)
MCH RBC QN AUTO: 32.4 PG (ref 26.8–34.3)
MCHC RBC AUTO-ENTMCNC: 33.8 G/DL (ref 31.4–37.4)
MCV RBC AUTO: 96 FL (ref 82–98)
MONOCYTES # BLD AUTO: 0.6 THOUSAND/ΜL (ref 0.17–1.22)
MONOCYTES NFR BLD AUTO: 6 % (ref 4–12)
MUCOUS THREADS UR QL AUTO: ABNORMAL
NEUTROPHILS # BLD AUTO: 8.79 THOUSANDS/ΜL (ref 1.85–7.62)
NEUTS SEG NFR BLD AUTO: 87 % (ref 43–75)
NITRITE UR QL STRIP: NEGATIVE
NON-SQ EPI CELLS URNS QL MICRO: ABNORMAL /HPF
NRBC BLD AUTO-RTO: 0 /100 WBCS
NT-PROBNP SERPL-MCNC: 230 PG/ML
PH UR STRIP.AUTO: 7 [PH]
PLATELET # BLD AUTO: 204 THOUSANDS/UL (ref 149–390)
PMV BLD AUTO: 9.3 FL (ref 8.9–12.7)
POTASSIUM SERPL-SCNC: 4.2 MMOL/L (ref 3.5–5.3)
PROCALCITONIN SERPL-MCNC: 0.13 NG/ML
PROT SERPL-MCNC: 8.1 G/DL (ref 6.4–8.2)
PROT UR STRIP-MCNC: ABNORMAL MG/DL
PROTHROMBIN TIME: 13.4 SECONDS (ref 11.6–14.5)
RBC # BLD AUTO: 4.11 MILLION/UL (ref 3.88–5.62)
RBC #/AREA URNS AUTO: ABNORMAL /HPF
RSV RNA RESP QL NAA+PROBE: NEGATIVE
SARS-COV-2 RNA RESP QL NAA+PROBE: NEGATIVE
SODIUM SERPL-SCNC: 137 MMOL/L (ref 136–145)
SP GR UR STRIP.AUTO: 1.02 (ref 1–1.03)
UROBILINOGEN UR QL STRIP.AUTO: 0.2 E.U./DL
WBC # BLD AUTO: 10.15 THOUSAND/UL (ref 4.31–10.16)
WBC #/AREA URNS AUTO: ABNORMAL /HPF

## 2022-04-20 PROCEDURE — 84484 ASSAY OF TROPONIN QUANT: CPT | Performed by: EMERGENCY MEDICINE

## 2022-04-20 PROCEDURE — 94664 DEMO&/EVAL PT USE INHALER: CPT

## 2022-04-20 PROCEDURE — 0241U HB NFCT DS VIR RESP RNA 4 TRGT: CPT | Performed by: EMERGENCY MEDICINE

## 2022-04-20 PROCEDURE — 70450 CT HEAD/BRAIN W/O DYE: CPT

## 2022-04-20 PROCEDURE — 36415 COLL VENOUS BLD VENIPUNCTURE: CPT | Performed by: EMERGENCY MEDICINE

## 2022-04-20 PROCEDURE — 99285 EMERGENCY DEPT VISIT HI MDM: CPT

## 2022-04-20 PROCEDURE — 85025 COMPLETE CBC W/AUTO DIFF WBC: CPT | Performed by: EMERGENCY MEDICINE

## 2022-04-20 PROCEDURE — G1004 CDSM NDSC: HCPCS

## 2022-04-20 PROCEDURE — 83605 ASSAY OF LACTIC ACID: CPT | Performed by: EMERGENCY MEDICINE

## 2022-04-20 PROCEDURE — 80048 BASIC METABOLIC PNL TOTAL CA: CPT | Performed by: EMERGENCY MEDICINE

## 2022-04-20 PROCEDURE — 87154 CUL TYP ID BLD PTHGN 6+ TRGT: CPT | Performed by: EMERGENCY MEDICINE

## 2022-04-20 PROCEDURE — 71046 X-RAY EXAM CHEST 2 VIEWS: CPT

## 2022-04-20 PROCEDURE — 93005 ELECTROCARDIOGRAM TRACING: CPT

## 2022-04-20 PROCEDURE — 85610 PROTHROMBIN TIME: CPT | Performed by: EMERGENCY MEDICINE

## 2022-04-20 PROCEDURE — 94760 N-INVAS EAR/PLS OXIMETRY 1: CPT

## 2022-04-20 PROCEDURE — 80076 HEPATIC FUNCTION PANEL: CPT | Performed by: EMERGENCY MEDICINE

## 2022-04-20 PROCEDURE — 87040 BLOOD CULTURE FOR BACTERIA: CPT | Performed by: EMERGENCY MEDICINE

## 2022-04-20 PROCEDURE — 99285 EMERGENCY DEPT VISIT HI MDM: CPT | Performed by: EMERGENCY MEDICINE

## 2022-04-20 PROCEDURE — 81001 URINALYSIS AUTO W/SCOPE: CPT | Performed by: EMERGENCY MEDICINE

## 2022-04-20 PROCEDURE — 84145 PROCALCITONIN (PCT): CPT | Performed by: EMERGENCY MEDICINE

## 2022-04-20 PROCEDURE — 85730 THROMBOPLASTIN TIME PARTIAL: CPT | Performed by: EMERGENCY MEDICINE

## 2022-04-20 PROCEDURE — 1123F ACP DISCUSS/DSCN MKR DOCD: CPT | Performed by: EMERGENCY MEDICINE

## 2022-04-20 PROCEDURE — 96374 THER/PROPH/DIAG INJ IV PUSH: CPT

## 2022-04-20 PROCEDURE — 87449 NOS EACH ORGANISM AG IA: CPT | Performed by: PHYSICIAN ASSISTANT

## 2022-04-20 PROCEDURE — 83880 ASSAY OF NATRIURETIC PEPTIDE: CPT | Performed by: EMERGENCY MEDICINE

## 2022-04-20 PROCEDURE — 99223 1ST HOSP IP/OBS HIGH 75: CPT | Performed by: PHYSICIAN ASSISTANT

## 2022-04-20 RX ORDER — NICOTINE 21 MG/24HR
1 PATCH, TRANSDERMAL 24 HOURS TRANSDERMAL DAILY
Status: DISCONTINUED | OUTPATIENT
Start: 2022-04-21 | End: 2022-04-22 | Stop reason: HOSPADM

## 2022-04-20 RX ORDER — ASPIRIN 81 MG/1
81 TABLET, CHEWABLE ORAL DAILY
Status: DISCONTINUED | OUTPATIENT
Start: 2022-04-21 | End: 2022-04-22 | Stop reason: HOSPADM

## 2022-04-20 RX ORDER — AZITHROMYCIN 500 MG/1
500 TABLET, FILM COATED ORAL EVERY 24 HOURS
Status: DISCONTINUED | OUTPATIENT
Start: 2022-04-21 | End: 2022-04-22 | Stop reason: HOSPADM

## 2022-04-20 RX ORDER — ASPIRIN 81 MG/1
81 TABLET, CHEWABLE ORAL DAILY
COMMUNITY

## 2022-04-20 RX ORDER — ACETAMINOPHEN 325 MG/1
650 TABLET ORAL ONCE
Status: COMPLETED | OUTPATIENT
Start: 2022-04-20 | End: 2022-04-20

## 2022-04-20 RX ORDER — ALBUTEROL SULFATE 90 UG/1
2 AEROSOL, METERED RESPIRATORY (INHALATION) EVERY 4 HOURS PRN
Status: DISCONTINUED | OUTPATIENT
Start: 2022-04-20 | End: 2022-04-20

## 2022-04-20 RX ORDER — ACETAMINOPHEN 325 MG/1
650 TABLET ORAL EVERY 6 HOURS PRN
Status: DISCONTINUED | OUTPATIENT
Start: 2022-04-20 | End: 2022-04-22 | Stop reason: HOSPADM

## 2022-04-20 RX ORDER — NIACIN 500 MG/1
500 TABLET, EXTENDED RELEASE ORAL
COMMUNITY

## 2022-04-20 RX ORDER — LANOLIN ALCOHOL/MO/W.PET/CERES
3 CREAM (GRAM) TOPICAL
Status: DISCONTINUED | OUTPATIENT
Start: 2022-04-20 | End: 2022-04-22 | Stop reason: HOSPADM

## 2022-04-20 RX ORDER — MAGNESIUM HYDROXIDE/ALUMINUM HYDROXICE/SIMETHICONE 120; 1200; 1200 MG/30ML; MG/30ML; MG/30ML
30 SUSPENSION ORAL EVERY 6 HOURS PRN
Status: DISCONTINUED | OUTPATIENT
Start: 2022-04-20 | End: 2022-04-22 | Stop reason: HOSPADM

## 2022-04-20 RX ADMIN — AZITHROMYCIN MONOHYDRATE 500 MG: 500 INJECTION, POWDER, LYOPHILIZED, FOR SOLUTION INTRAVENOUS at 20:49

## 2022-04-20 RX ADMIN — CEFTRIAXONE SODIUM 1000 MG: 10 INJECTION, POWDER, FOR SOLUTION INTRAVENOUS at 19:51

## 2022-04-20 RX ADMIN — ACETAMINOPHEN 650 MG: 325 TABLET ORAL at 18:43

## 2022-04-20 NOTE — ED NOTES
Patient family member not listed in chart calling for information about patient  Family member unable to provide patient's birthday  Explained to family member that we are unable to give patient information out to people not listed in the chart as the patient is confused and unable to consent to information being shared  Instructed family member to reach out to Will Giraldo, listed in chart as patient's son, to be added to patient's chart  Charge nurse aware       Arloa Nissen, RN  04/20/22 1900

## 2022-04-20 NOTE — ED NOTES
Patient transported to [facility], [address] [place] [address]  04/20/22 1447

## 2022-04-20 NOTE — ED PROVIDER NOTES
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 Taking?   acetaminophen (TYLENOL) 500 mg tablet   No No   Sig: Take 1 tablet (500 mg total) by mouth every 4 (four) hours as needed for moderate pain or headaches   melatonin 3 mg   No No   Sig: Take 1 tablet (3 mg total) by mouth daily at bedtime   neomycin-bacitracin-polymyxin b (NEOSPORIN) ointment   No No   Sig: Apply topically 2 (two) times a day Apply to affected abrasions twice daily      Facility-Administered Medications: None       Past Medical History:   Diagnosis Date    Bladder cancer ([facility] [address] )     CAD (coronary artery disease)     Dyslipidemia     History of heart artery stent     Umbilical hernia        Past Surgical History:   Procedure Laterality Date    BLADDER SURGERY      CORONARY ANGIOPLASTY WITH STENT PLACEMENT      TRANSODONTOID FUSION N/A 5/4/2020    Procedure: Transodontoid Fusion;  Surgeon: Susan Molina MD;  Location: [facility];  Service: Neurosurgery       Family History   Family history unknown: Yes     I have reviewed and agree with the history as documented  E-Cigarette/Vaping    E-Cigarette Use Never User      E-Cigarette/Vaping Substances    Nicotine No     THC No     CBD No     Flavoring No     Other No     Unknown No      Social History     Tobacco Use    Smoking status: Current Every Day Smoker     Packs/day: 0 50    Smokeless tobacco: Never Used   Vaping Use    Vaping Use: Never used   Substance Use Topics    Alcohol use: Yes    Drug use: Never       Review of Systems    Physical Exam  Physical Exam  Vitals and nursing note reviewed  Constitutional:       General: He is not in acute distress  Appearance: He is well-developed     HENT:      Head: Normocephalic and atraumatic  Eyes:      Conjunctiva/sclera: Conjunctivae normal       Pupils: Pupils are equal, round, and reactive to light  Neck:      Trachea: No tracheal deviation  Cardiovascular:      Rate and Rhythm: Normal rate and regular rhythm  Heart sounds: Normal heart sounds  Pulmonary:      Effort: Pulmonary effort is normal  No respiratory distress  Breath sounds: Normal breath sounds  Abdominal:      General: There is no distension  Palpations: Abdomen is soft  Tenderness: There is no abdominal tenderness  Musculoskeletal:      Cervical back: Normal range of motion  Skin:     General: Skin is warm and dry  Neurological:      General: No focal deficit present  Mental Status: He is alert  He is disoriented  GCS: GCS eye subscore is 4  GCS verbal subscore is 5  GCS motor subscore is 6  Comments: Mild generalized weakness, nonfocal  Only able to follow simple commands, not more complex commands  Able to pull himself up to a seated position without assistance, though seems slightly shaky while trying to stay seated without assistance  Oriented to himself, otherwise confused and not responding appropriately to questions     Psychiatric:         Behavior: Behavior normal          Vital Signs  ED Triage Vitals   Temperature Pulse Respirations Blood Pressure SpO2   04/20/22 1810 04/20/22 1817 04/20/22 1817 04/20/22 1817 04/20/22 1817   99 7 °F (37 6 °C) 96 18 159/75 96 %      Temp Source Heart Rate Source Patient Position - Orthostatic VS BP Location FiO2 (%)   04/20/22 1810 04/20/22 1817 04/20/22 1817 04/20/22 1817 --   Oral Monitor Sitting Right arm       Pain Score       --                  Vitals:    04/20/22 1817 04/20/22 1845 04/20/22 1945   BP: 159/75 150/72 136/65   Pulse: 96 89 94   Patient Position - Orthostatic VS: Sitting Sitting Lying         Visual Acuity      ED Medications  Medications   azithromycin (ZITHROMAX) 500 mg in sodium chloride 0 9% 250mL IVPB 500 mg (has no administration in time range)   acetaminophen (TYLENOL) tablet 650 mg (650 mg Oral Given 4/20/22 1843)   ceftriaxone (ROCEPHIN) 1 g/50 mL in dextrose IVPB (1,000 mg Intravenous New Bag 4/20/22 1951)       Diagnostic Studies  Results Reviewed     Procedure Component Value Units Date/Time    Urine Microscopic [[id number]]  (Abnormal) Collected: 04/20/22 1952    Lab Status: Final result Specimen: Urine, Clean Catch Updated: 04/20/22 2011     RBC, UA 2-4 /hpf      WBC, UA 2-4 /hpf      Epithelial Cells Occasional /hpf      Bacteria, UA None Seen /hpf      MUCUS THREADS Occasional    HS Troponin I 4hr [[id number]]     Lab Status: No result Specimen: Blood     UA w Reflex to Microscopic w Reflex to Culture [[id number]]  (Abnormal) Collected: 04/20/22 1952    Lab Status: Final result Specimen: Urine, Clean Catch Updated: 04/20/22 2000     Color, UA Yellow     Clarity, UA Clear     Specific Gravity, UA 1 025     pH, UA 7 0     Leukocytes, UA Negative     Nitrite, UA Negative     Protein, UA 30 (1+) mg/dl      Glucose, UA Negative mg/dl      Ketones, UA Negative mg/dl      Urobilinogen, UA 0 2 E U /dl      Bilirubin, UA Negative     Blood, UA Trace-Intact    COVID/FLU/RSV - 2 hour TAT [[id number]]  (Normal) Collected: 04/20/22 1828    Lab Status: Final result Specimen: Nares from Nasopharyngeal Swab Updated: 04/20/22 1916     SARS-CoV-2 Negative     INFLUENZA A PCR Negative     INFLUENZA B PCR Negative     RSV PCR Negative    Narrative:      FOR PEDIATRIC PATIENTS - copy/paste COVID Guidelines URL to browser: [email or web address]    SARS-CoV-2 assay is a Nucleic Acid Amplification assay intended for the  qualitative detection of nucleic acid from SARS-CoV-2 in nasopharyngeal  swabs  Results are for the presumptive identification of SARS-CoV-2 RNA      Positive results are indicative of infection with SARS-CoV-2, the virus  causing COVID-19, but do not rule out bacterial infection or co-infection  with other viruses  Laboratories within the [place] and its  territories are required to report all positive results to the appropriate  public health authorities  Negative results do not preclude SARS-CoV-2  infection and should not be used as the sole basis for treatment or other  patient management decisions  Negative results must be combined with  clinical observations, patient history, and epidemiological information  This test has not been FDA cleared or approved  This test has been authorized by FDA under an Emergency Use Authorization  (EUA)  This test is only authorized for the duration of time the  declaration that circumstances exist justifying the authorization of the  emergency use of an in vitro diagnostic tests for detection of SARS-CoV-2  virus and/or diagnosis of COVID-19 infection under section 564(b)(1) of  the Act, 21 U  S C  164JTF-0(Y)(6), unless the authorization is terminated  or revoked sooner  The test has been validated but independent review by FDA  and CLIA is pending  Test performed using [employer] Gene[employer]pert: This RT-PCR assay targets N2,  a region unique to SARS-CoV-2  A conserved region in the E-gene was chosen  for pan-Sarbecovirus detection which includes SARS-CoV-2      Procalcitonin [[id number]]  (Normal) Collected: 04/20/22 1828    Lab Status: Final result Specimen: Blood from Arm, Left Updated: 04/20/22 1903     Procalcitonin 0 13 ng/ml     HS Troponin I 2hr [[id number]]     Lab Status: No result Specimen: Blood     HS Troponin 0hr (reflex protocol) [[id number]]  (Normal) Collected: 04/20/22 1828    Lab Status: Final result Specimen: Blood from Arm, Left Updated: 04/20/22 1902     hs TnI 0hr 9 ng/L     NT-BNP PRO [[id number]]  (Normal) Collected: 04/20/22 1828    Lab Status: Final result Specimen: Blood from Arm, Left Updated: 04/20/22 1859     NT-proBNP 230 pg/mL     Basic metabolic panel [[id number]]  (Abnormal) Collected: 04/20/22 1828    Lab Status: Final result Specimen: Blood from Arm, Left Updated: 04/20/22 1859     Sodium 137 mmol/L      Potassium 4 2 mmol/L      Chloride 104 mmol/L      CO2 22 mmol/L      ANION GAP 11 mmol/L      BUN 25 mg/dL      Creatinine 1 10 mg/dL      Glucose 105 mg/dL      Calcium 10 2 mg/dL      eGFR 60 ml/min/1 73sq m     Narrative:      [place] guidelines for Chronic Kidney Disease (CKD):     Stage 1 with normal or high GFR (GFR > 90 mL/min/1 73 square meters)    Stage 2 Mild CKD (GFR = 60-89 mL/min/1 73 square meters)    Stage 3A Moderate CKD (GFR = 45-59 mL/min/1 73 square meters)    Stage 3B Moderate CKD (GFR = 30-44 mL/min/1 73 square meters)    Stage 4 Severe CKD (GFR = 15-29 mL/min/1 73 square meters)    Stage 5 End Stage CKD (GFR <15 mL/min/1 73 square meters)  Note: GFR calculation is accurate only with a steady state creatinine    Hepatic function panel [[id number]]  (Abnormal) Collected: 04/20/22 1828    Lab Status: Final result Specimen: Blood from Arm, Left Updated: 04/20/22 1859     Total Bilirubin 0 54 mg/dL      Bilirubin, Direct 0 16 mg/dL      Alkaline Phosphatase 120 U/L      AST 29 U/L      ALT 36 U/L      Total Protein 8 1 g/dL      Albumin 3 8 g/dL     Lactic acid [[id number]]  (Normal) Collected: 04/20/22 1828    Lab Status: Final result Specimen: Blood from Arm, Left Updated: 04/20/22 1855     LACTIC ACID 1 3 mmol/L     Narrative:      Result may be elevated if tourniquet was used during collection      Weston Jadon [[id number]]  (Normal) Collected: 04/20/22 1828    Lab Status: Final result Specimen: Blood from Arm, Left Updated: 04/20/22 1847     Protime 13 4 seconds      INR 1 06    APTT [[id number]]  (Normal) Collected: 04/20/22 1828    Lab Status: Final result Specimen: Blood from Arm, Left Updated: 04/20/22 1847     PTT 26 seconds     CBC and differential [[id number]]  (Abnormal) Collected: 04/20/22 1828    Lab Status: Final result Specimen: Blood from Arm, Left Updated: 04/20/22 1835     WBC 10 15 Thousand/uL      RBC 4 11 Million/uL      Hemoglobin 13 3 g/dL      Hematocrit 39 4 %      MCV 96 fL      MCH 32 4 pg      MCHC 33 8 g/dL      RDW 14 5 %      MPV 9 3 fL      Platelets 861 Thousands/uL      nRBC 0 /100 WBCs      Neutrophils Relative 87 %      Immat GRANS % 0 %      Lymphocytes Relative 7 %      Monocytes Relative 6 %      Eosinophils Relative 0 %      Basophils Relative 0 %      Neutrophils Absolute 8 79 Thousands/µL      Immature Grans Absolute 0 03 Thousand/uL      Lymphocytes Absolute 0 66 Thousands/µL      Monocytes Absolute 0 60 Thousand/µL      Eosinophils Absolute 0 03 Thousand/µL      Basophils Absolute 0 04 Thousands/µL     Blood culture #2 [[id number]] Collected: 04/20/22 1828    Lab Status: In process Specimen: Blood from Hand, Right Updated: 04/20/22 1832    Blood culture #1 [[id number]] Collected: 04/20/22 1828    Lab Status: In process Specimen: Blood from Arm, Left Updated: 04/20/22 1832                 XR chest 2 views   ED Interpretation by Madelyn Lala MD (04/20 1930)   Patchy infiltrates, L>R      CT head without contrast   Final Result by Nisha Leon MD (04/20 1903)      No acute intracranial abnormality                    Workstation performed: [id number]                    Procedures  ECG 12 Lead Documentation Only    Date/Time: 4/20/2022 6:49 PM  Performed by: Madelyn Lala MD  Authorized by: Madelyn Lala MD     Indications / Diagnosis:  AMS  ECG reviewed by me, the ED Provider: yes    Patient location:  ED  Previous ECG:     Previous ECG:  Compared to current    Comparison ECG info:  04/28/20    Similarity:  Changes noted  Interpretation:     Interpretation: abnormal    Quality:     Tracing quality:  Limited by artifact  Rate:     ECG rate:  88    ECG rate assessment: normal    Rhythm:     Rhythm: sinus rhythm    Ectopy:     Ectopy: none QRS:     QRS axis:  Right    QRS intervals:  Normal  Conduction:     Conduction: normal    ST segments:     ST segments:  Non-specific    Depression:  V5 and V6  T waves:     T waves: flattening and inverted      T waves comment:  Changed in V4/V5 new    Flattening:  AVL, aVF, III and V5    Inverted:  V4             ED Course                               SBIRT 22yo+      Most Recent Value   SBIRT (22 yo +)    In order to provide better care to our patients, we are screening all of our patients for alcohol and drug use  Would it be okay to ask you these screening questions? Yes Filed at: 04/20/2022 1840   Initial Alcohol Screen: US AUDIT-C     1  How often do you have a drink containing alcohol? 0 Filed at: 04/20/2022 1840   2  How many drinks containing alcohol do you have on a typical day you are drinking? 0 Filed at: 04/20/2022 1840   3a  Male UNDER 65: How often do you have five or more drinks on one occasion? 0 Filed at: 04/20/2022 1840   Audit-C Score 0 Filed at: 04/20/2022 1840   SKINNY: How many times in the past year have you    Used an illegal drug or used a prescription medication for non-medical reasons? 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  The patient is able to tell me his name, when asked if anything is bothering him says "a little bit," and when asked what is bothering him he asks "what's what?" He does not know where he is and when asked says "up "  He is unable to provide any information regarding where he is, why he is in the hospital, or any recent events    He did have a fall on 4/28/20 where he suffered an odontoid fracture, and a fall on 9/19/21 where he had a T12 compression fracture  He last saw his PCP 10/12/21 for hospital follow-up from a fall, and pulmonology on 10/12/21 for incidental spiculated right upper lobe lesion on CT scan concerning for possible cancer for which the patient declined PET scan or further evaluation  Review of systems otherwise negative unless stated as above    He is well appearing, in no acute distress  Temperature here is 100 3  He is oriented to himself and is otherwise confused  He is able to follow simple commands but has difficulties with more complex commands  He has mild generalized weakness but no focal deficits  Exam is otherwise unremarkable  This is most likely infectious etiology causing his altered mental status, however as he has had several falls over the past two years with significant injuries there was concern for possible intracranial hemorrhage contributing to the altered mental status  We will check lab work and CT scan of his head to evaluate  His temperature is only 100 3 so not truly febrile, but does not meet sepsis criteria  CT scan shows no acute abnormalities  He has mild hypercalcemia of 10 2  Labs are otherwise unremarkable  Chest x-ray was reviewed by myself and shows bilateral patchy infiltrates  COVID/flu is negative, so we will treat this as community-acquired pneumonia  I did discuss with the patient findings and treatment plan, and he expresses understanding    On re-evaluation after his fever improved he thinks he is in a hospital   He still does not know month or year and denies any complaints, and does not know why he is in the hospital        Amount and/or Complexity of Data Reviewed  Clinical lab tests: ordered and reviewed  Tests in the radiology section of CPT®: reviewed and ordered  Decide to obtain previous medical records or to obtain history from someone other than the patient: yes  Review and summarize past medical records: yes  Discuss the patient with other providers: yes  Independent visualization of images, tracings, or specimens: yes        Disposition  Final diagnoses:   CAP (community acquired pneumonia)   Acute encephalopathy     Time reflects when diagnosis was documented in both MDM as applicable and the Disposition within this note     Time User Action Codes Description Comment    4/20/2022  7:35 PM Tomás Iglesias Add [J18 9] CAP (community acquired pneumonia)     4/20/2022  7:36 PM Tomás Iglesias Add [G93 40] Acute encephalopathy       ED Disposition     ED Disposition Condition Date/Time Comment    Admit Stable Wed Apr 20, 2022  7:35 PM Case was discussed with Areli Patterson and the patient's admission status was agreed to be Admission Status: inpatient status to the service of Dr Pepe Duran   Follow-up Information    None         Patient's Medications   Discharge Prescriptions    No medications on file       No discharge procedures on file      PDMP Review     None          ED Provider  Electronically Signed by           Karen Houston MD  04/20/22 1402       Karen Houston MD  04/20/22 9861

## 2022-04-21 PROBLEM — R41.82 AMS (ALTERED MENTAL STATUS): Status: RESOLVED | Noted: 2022-04-20 | Resolved: 2022-04-21

## 2022-04-21 LAB
ATRIAL RATE: 88 BPM
P AXIS: 88 DEGREES
PR INTERVAL: 126 MS
PROCALCITONIN SERPL-MCNC: 0.68 NG/ML
QRS AXIS: 97 DEGREES
QRSD INTERVAL: 92 MS
QT INTERVAL: 352 MS
QTC INTERVAL: 425 MS
S PNEUM AG UR QL: POSITIVE
T WAVE AXIS: 27 DEGREES
VENTRICULAR RATE: 88 BPM

## 2022-04-21 PROCEDURE — 97110 THERAPEUTIC EXERCISES: CPT

## 2022-04-21 PROCEDURE — 93010 ELECTROCARDIOGRAM REPORT: CPT | Performed by: INTERNAL MEDICINE

## 2022-04-21 PROCEDURE — 97129 THER IVNTJ 1ST 15 MIN: CPT

## 2022-04-21 PROCEDURE — 97163 PT EVAL HIGH COMPLEX 45 MIN: CPT

## 2022-04-21 PROCEDURE — 99233 SBSQ HOSP IP/OBS HIGH 50: CPT | Performed by: STUDENT IN AN ORGANIZED HEALTH CARE EDUCATION/TRAINING PROGRAM

## 2022-04-21 PROCEDURE — 97166 OT EVAL MOD COMPLEX 45 MIN: CPT

## 2022-04-21 PROCEDURE — 84145 PROCALCITONIN (PCT): CPT | Performed by: PHYSICIAN ASSISTANT

## 2022-04-21 RX ADMIN — ASPIRIN 81 MG: 81 TABLET, CHEWABLE ORAL at 10:49

## 2022-04-21 RX ADMIN — GUAIFENESIN 200 MG: 200 SOLUTION ORAL at 10:50

## 2022-04-21 RX ADMIN — ENOXAPARIN SODIUM 40 MG: 40 INJECTION SUBCUTANEOUS at 10:49

## 2022-04-21 RX ADMIN — NICOTINE 1 PATCH: 14 PATCH, EXTENDED RELEASE TRANSDERMAL at 10:49

## 2022-04-21 RX ADMIN — AZITHROMYCIN 500 MG: 500 TABLET, FILM COATED ORAL at 22:00

## 2022-04-21 RX ADMIN — CEFTRIAXONE SODIUM 1000 MG: 10 INJECTION, POWDER, FOR SOLUTION INTRAVENOUS at 21:00

## 2022-04-21 NOTE — PLAN OF CARE
Problem: PHYSICAL THERAPY ADULT  Goal: Performs mobility at highest level of function for planned discharge setting  See evaluation for individualized goals  Description: Treatment/Interventions: Functional transfer training,LE strengthening/ROM,Therapeutic exercise,Endurance training,Patient/family training,Equipment eval/education,Bed mobility,Gait training,Spoke to nursing,Spoke to case management,OT  Equipment Recommended: Charles Ruiz (continue with RW)       See flowsheet documentation for full assessment, interventions and recommendations  4/21/2022 1312 by Gil Spears PT  Note: Prognosis: Good  Problem List: Decreased strength,Decreased endurance,Impaired balance,Decreased mobility,Decreased cognition,Decreased safety awareness  Assessment: Pt is 80 y o  male seen for high-complexity PT evaluation on 4/21/2022 s/p admit to [facility] on 4/20/2022 w/ CAP (community acquired pneumonia)  PT was consulted to assess pt's functional mobility and d/c needs  Order placed for PT eval and tx, w/ up and OOB as tolerated order  PTA, pt resides with son in [facility], 1st floor setup, 0 RICHI, ambulates with RW, +fall history  Pt poor historian, CM to follow up  At time of eval, pt performing transfers and level surface gait trial at min A level, use of RW for level surface gait  Upon evaluation, pt presenting with impaired functional mobility d/t decreased strength, decreased endurance, impaired balance, decreased mobility, decreased cognition, decreased safety awareness and activity intolerance  Pertinent PMHx and current co-morbidities affecting pt's physical performance at time of assessment include: CAP, CAD, sepsis, AMS, h/o odontoid fracture, renal cyst, liver cyst, delirium, h/o falls, bladder cancer, umbilical hernia   Personal factors affecting pt at time of eval include: lives in 2 story house, inability to navigate community distances, decreased cognition, positive fall history and limited insight into impairments  The following objective measures performed on IE also reveal limitations: Barthel Index: 35/100, Modified Reji: 4 (moderate/severe disability) and AM-PAC 6-Clicks: 35/83  Pt's clinical presentation is currently unstable/unpredictable seen in pt's presentation of advanced age, abnormal lab value(s), need for input for task focus and mobility technique and ongoing medical assessment  Overall, pt's rehab potential and prognosis to return to PLOF is good as impacted by objective findings, warranting pt to receive further skilled PT interventions to address identified impairments, activity limitation(s), and participation restriction(s)  Pt to benefit from continued PT tx to address deficits as defined above and maximize level of functional independent mobility  From PT/mobility standpoint, recommendation at time of d/c would be post acute rehabilitation services vs HHPT pending progress in order to facilitate return to PLOF  Barriers to Discharge: Inaccessible home environment,Decreased caregiver support        PT Discharge Recommendation: Post acute rehabilitation services (vs HHPT pending progress/ baseline support)          See flowsheet documentation for full assessment  4/21/2022 1312 by Kaitlynn Riley PT  Note: Prognosis: Good  Problem List: Decreased strength,Decreased endurance,Impaired balance,Decreased mobility,Decreased cognition,Decreased safety awareness  Assessment: Pt is 80 y o  male seen for high-complexity PT evaluation on 4/21/2022 s/p admit to [facility] on 4/20/2022 w/ CAP (community acquired pneumonia)  PT was consulted to assess pt's functional mobility and d/c needs  Order placed for PT eval and tx, w/ up and OOB as tolerated order  PTA, pt resides with son in [facility], 1st floor setup, 0 RICHI, ambulates with RW, +fall history  Pt poor historian, CM to follow up   At time of eval, pt performing transfers and level surface gait trial at min A level, use of RW for level surface gait  Upon evaluation, pt presenting with impaired functional mobility d/t decreased strength, decreased endurance, impaired balance, decreased mobility, decreased cognition, decreased safety awareness and activity intolerance  Pertinent PMHx and current co-morbidities affecting pt's physical performance at time of assessment include: CAP, CAD, sepsis, AMS, h/o odontoid fracture, renal cyst, liver cyst, delirium, h/o falls, bladder cancer, umbilical hernia  Personal factors affecting pt at time of eval include: lives in 2 story house, inability to navigate community distances, decreased cognition, positive fall history and limited insight into impairments  The following objective measures performed on IE also reveal limitations: Barthel Index: 35/100, Modified [place]ascade: 4 (moderate/severe disability) and AM-PAC 6-Clicks: 86/80  Pt's clinical presentation is currently unstable/unpredictable seen in pt's presentation of advanced age, abnormal lab value(s), need for input for task focus and mobility technique and ongoing medical assessment  Overall, pt's rehab potential and prognosis to return to PLOF is good as impacted by objective findings, warranting pt to receive further skilled PT interventions to address identified impairments, activity limitation(s), and participation restriction(s)  Pt to benefit from continued PT tx to address deficits as defined above and maximize level of functional independent mobility  From PT/mobility standpoint, recommendation at time of d/c would be post acute rehabilitation services vs HHPT pending progress in order to facilitate return to PLOF  Barriers to Discharge: Inaccessible home environment,Decreased caregiver support        PT Discharge Recommendation: Post acute rehabilitation services (vs HHPT pending progress/ baseline support)          See flowsheet documentation for full assessment

## 2022-04-21 NOTE — ASSESSMENT & PLAN NOTE
· Chest x-ray revealing right sided patchy appearance  · COVID/flu/RSV test negative  · Continue IV ceftriaxone 1 g daily and p o   Azithromycin 500 mg daily  · Check sputum culture and Gram stain and strep pneumoniae  · Incentive spirometry, respiratory protocol

## 2022-04-21 NOTE — H&P
[address] [facility]  H&P- Savannah Mi 1935, 80 y o  male MRN: [id number]  Unit/Bed#: ED 08 Encounter: [id number]  Primary Care Provider: Linda Madrid PA-C   Date and time admitted to hospital: 4/20/2022  6:08 PM    * Community acquired pneumonia of right lung  Assessment & Plan  · Chest x-ray revealing right sided patchy appearance  · COVID/flu/RSV test negative  · Continue IV ceftriaxone 1 g daily and p o  Azithromycin 500 mg daily  · Check sputum culture and Gram stain and strep pneumoniae  · Incentive spirometry, respiratory protocol    AMS (altered mental status)  Assessment & Plan  · Most likely acute metabolic encephalopathy secondary to pneumonia  · CT head negative  · Chest x-ray revealing right-sided patchiness, will treat for bacterial pneumonia  · See plan under community-acquired pneumonia of right lung  · Fall precautions, delirium precautions    Sepsis ([facility] [address] )  Assessment & Plan  · Meeting criteria due to tachycardia and tachypnea most likely secondary to pneumonia  · Lactic acid WNL and procalcitonin WNL, blood culture x2 pending  · Continue antibiotics  · Blood pressure currently stable so will hold off on IV fluids at this time    CAD (coronary artery disease)  Assessment & Plan  · Continue home aspirin      VTE Pharmacologic Prophylaxis: VTE Score: 5 High Risk (Score >/= 5) - Pharmacological DVT Prophylaxis Ordered: enoxaparin (Lovenox)  Sequential Compression Devices Ordered  Code Status: Level 1 - Full Code per protocol, no advanced directive on file and patient currently cannot answer question appropriately      Anticipated Length of Stay: Patient will be admitted on an inpatient basis with an anticipated length of stay of greater than 2 midnights secondary to See above  Total Time for Visit, including Counseling / Coordination of Care: 60 minutes Greater than 50% of this total time spent on direct patient counseling and coordination of care      Chief Complaint: Chief Complaint   Patient presents with    Altered Mental Status     pt reported to have not been acting himself, pt usually stacks fireword and wasn't able to today, pt reported to have chronic cough       History of Present Illness:  Andrew Camejo is a 80 y o  male with a PMH of CAD, bladder cancer, tobacco use disorder who presents after family noticed he appeared more confused with altered mental status at home per ED provider note  Upon approach patient is sleeping but easily alert to calling out name  Is alert and oriented to name and month only  Disoriented to place and year and cannot provide medical history  Patient reports that he feels tired but denies he is currently in any pain or having any difficulty breathing or shortness of breath       Review of Systems:  Review of Systems   Reason unable to perform ROS: Limited due to altered mental status  Constitutional: Positive for fatigue  Respiratory: Negative for shortness of breath  Cardiovascular: Negative for chest pain  Musculoskeletal: Negative for myalgias  Neurological: Positive for headaches  Past Medical and Surgical History:   Past Medical History:   Diagnosis Date    Bladder cancer ([address] )     CAD (coronary artery disease)     Dyslipidemia     History of heart artery stent     Umbilical hernia        Past Surgical History:   Procedure Laterality Date    BLADDER SURGERY      CORONARY ANGIOPLASTY WITH STENT PLACEMENT      TRANSODONTOID FUSION N/A 5/4/2020    Procedure: Transodontoid Fusion;  Surgeon: Shawna Skelton MD;  Location: [facility];  Service: Neurosurgery       Meds/Allergies:  Prior to Admission medications    Medication Sig Start Date End Date Taking?  Authorizing Provider   aspirin 81 mg chewable tablet Chew 81 mg daily   Yes Historical Provider, MD   acetaminophen (TYLENOL) 500 mg tablet Take 1 tablet (500 mg total) by mouth every 4 (four) hours as needed for moderate pain or headaches 4/28/20   [facility] Manohar Gallardo PA-C   melatonin 3 mg Take 1 tablet (3 mg total) by mouth daily at bedtime 5/6/20   Ruth Griffith PA-C   neomycin-bacitracin-polymyxin b (NEOSPORIN) ointment Apply topically 2 (two) times a day Apply to affected abrasions twice daily 4/28/20   Chasity Watts PA-C   niacin (NIASPAN) 500 mg CR tablet Take 500 mg by mouth    Historical Provider, MD     Have reviewed home medications per review of chart  Allergies: Allergies   Allergen Reactions    Iodine - Food Allergy      Other reaction(s): Unknown Reaction       Social History:  Marital Status:      Patient Pre-hospital Living Situation: Home  Patient Pre-hospital Level of Mobility: walks  Patient Pre-hospital Diet Restrictions: n/a  Substance Use History:   Social History     Substance and Sexual Activity   Alcohol Use Yes     Social History     Tobacco Use   Smoking Status Current Every Day Smoker    Packs/day: 0 50   Smokeless Tobacco Never Used     Social History     Substance and Sexual Activity   Drug Use Never       Family History:  Family History   Family history unknown: Yes       Physical Exam:     Vitals:   Blood Pressure: 136/65 (04/20/22 1945)  Pulse: 94 (04/20/22 1945)  Temperature: 99 4 °F (37 4 °C) (04/20/22 1945)  Temp Source: Oral (04/20/22 1945)  Respirations: (!) 24 (04/20/22 1945)  Weight - Scale: 65 7 kg (144 lb 13 5 oz) (04/20/22 1843)  SpO2: 95 % (04/20/22 1945)    Physical Exam  Vitals and nursing note reviewed  Constitutional:       General: He is not in acute distress  Appearance: Normal appearance  He is not diaphoretic  HENT:      Head: Normocephalic  Cardiovascular:      Rate and Rhythm: Normal rate and regular rhythm  Heart sounds: Normal heart sounds  Pulmonary:      Effort: Pulmonary effort is normal  No respiratory distress  Breath sounds: Normal breath sounds  Abdominal:      General: Abdomen is flat  Bowel sounds are normal  There is no distension        Palpations: Abdomen is soft  Tenderness: There is no abdominal tenderness  Musculoskeletal:         General: No tenderness  Right lower leg: No edema  Left lower leg: No edema  Skin:     General: Skin is warm and dry  Coloration: Skin is not pale  Neurological:      Mental Status: He is alert  Comments: Disoriented to year and place  Alert and oriented to name and month  Psychiatric:         Behavior: Behavior normal          Additional Data:     Lab Results:  Results from last 7 days   Lab Units 04/20/22  1828   WBC Thousand/uL 10 15   HEMOGLOBIN g/dL 13 3   HEMATOCRIT % 39 4   PLATELETS Thousands/uL 204   NEUTROS PCT % 87*   LYMPHS PCT % 7*   MONOS PCT % 6   EOS PCT % 0     Results from last 7 days   Lab Units 04/20/22  1828   SODIUM mmol/L 137   POTASSIUM mmol/L 4 2   CHLORIDE mmol/L 104   CO2 mmol/L 22   BUN mg/dL 25   CREATININE mg/dL 1 10   ANION GAP mmol/L 11   CALCIUM mg/dL 10 2*   ALBUMIN g/dL 3 8   TOTAL BILIRUBIN mg/dL 0 54   ALK PHOS U/L 120*   ALT U/L 36   AST U/L 29   GLUCOSE RANDOM mg/dL 105     Results from last 7 days   Lab Units 04/20/22  1828   INR  1 06             Results from last 7 days   Lab Units 04/20/22  1828   LACTIC ACID mmol/L 1 3   PROCALCITONIN ng/ml 0 13       Imaging: Reviewed radiology reports from this admission including: CT head and Personally reviewed the following imaging: chest xray  XR chest 2 views   ED Interpretation by Eduarda Wren MD (04/20 1930)   Patchy infiltrates, L>R      CT head without contrast   Final Result by Jese Vargas MD (04/20 1903)      No acute intracranial abnormality  Workstation performed: [id number]             EKG and Other Studies Reviewed on Admission:   · EKG: Normal sinus rhythm, LVH, ST and T-wave abnormality  ** Please Note: This note has been constructed using a voice recognition system   **

## 2022-04-21 NOTE — ASSESSMENT & PLAN NOTE
· Strep urine antigen + suggesting strep pneumonia  · Continue IV ceftriaxone  · Significant improvement overnight  · Not requiring oxygen, no respiratory distress  · Reassess in AM, if stable transition to PO antibiotics and discharge

## 2022-04-21 NOTE — PROGRESS NOTES
[address] [facility]  Progress Note Carri Sams 1935, 80 y o  male MRN: [id number]  Unit/Bed#: -01 Encounter: [id number]  Primary Care Provider: Addis Munoz PA-C   Date and time admitted to hospital: 4/20/2022  6:08 PM    * CAP (community acquired pneumonia)  Assessment & Plan  · Strep urine antigen + suggesting strep pneumonia  · Continue IV ceftriaxone  · Significant improvement overnight  · Not requiring oxygen, no respiratory distress  · Reassess in AM, if stable transition to PO antibiotics and discharge    Sepsis ([address] )  Assessment & Plan  · Secondary to community acquired pneumonia  · Continue antibiotics, follow up remaining infectious work up  · Sepsis resolved    AMS (altered mental status)-resolved as of 4/21/2022  Assessment & Plan  · Most likely acute metabolic encephalopathy secondary to pneumonia  · CT head negative  · With initiation of antibiotics, patient is back to baseline mental status     CAD (coronary artery disease)  Assessment & Plan  · Continue home aspirin          VTE Pharmacologic Prophylaxis: VTE Score: 5 High Risk (Score >/= 5) - Pharmacological DVT Prophylaxis Ordered: enoxaparin (Lovenox)  Sequential Compression Devices Ordered  Patient Centered Rounds: I performed bedside rounds with nursing staff today  Discussions with Specialists or Other Care Team Provider: gibran    Education and Discussions with Family / Patient: Updated  (granddaughter ) at bedside  Time Spent for Care: 30 minutes  More than 50% of total time spent on counseling and coordination of care as described above  Current Length of Stay: 1 day(s)  Current Patient Status: Inpatient   Certification Statement: The patient will continue to require additional inpatient hospital stay due to antibiotics  Discharge Plan: Anticipate discharge tomorrow to home  Code Status: Level 1 - Full Code    Subjective:   No chest pain or chest palpitations  No shortness of breath  Appetite is good  Objective:     Vitals:   Temp (24hrs), Av 7 °F (37 1 °C), Min:97 4 °F (36 3 °C), Max:100 3 °F (37 9 °C)    Temp:  [97 4 °F (36 3 °C)-100 3 °F (37 9 °C)] 97 4 °F (36 3 °C)  HR:  [63-96] 63  Resp:  [14-39] 14  BP: (114-159)/(58-75) 122/68  SpO2:  [91 %-97 %] 91 %  Body mass index is 19 11 kg/m²  Input and Output Summary (last 24 hours): Intake/Output Summary (Last 24 hours) at 2022 1458  Last data filed at 2022 0736  Gross per 24 hour   Intake 120 ml   Output 200 ml   Net -80 ml       Physical Exam:   Physical Exam  Vitals and nursing note reviewed  Constitutional:       Appearance: Normal appearance  HENT:      Head: Normocephalic and atraumatic  Right Ear: External ear normal       Left Ear: External ear normal       Nose: No congestion or rhinorrhea  Mouth/Throat:      Mouth: Mucous membranes are dry  Pharynx: Oropharynx is clear  Eyes:      General:         Right eye: No discharge  Left eye: No discharge  Cardiovascular:      Rate and Rhythm: Normal rate and regular rhythm  Pulmonary:      Effort: Pulmonary effort is normal  No respiratory distress  Abdominal:      General: Abdomen is flat  Palpations: Abdomen is soft  Musculoskeletal:      Cervical back: Normal range of motion and neck supple  Right lower leg: No edema  Left lower leg: No edema  Skin:     General: Skin is warm and dry  Neurological:      General: No focal deficit present  Mental Status: He is alert  Mental status is at baseline     Psychiatric:         Mood and Affect: Mood normal          Behavior: Behavior normal           Additional Data:     Labs:  Results from last 7 days   Lab Units 22  1828   WBC Thousand/uL 10 15   HEMOGLOBIN g/dL 13 3   HEMATOCRIT % 39 4   PLATELETS Thousands/uL 204   NEUTROS PCT % 87*   LYMPHS PCT % 7*   MONOS PCT % 6   EOS PCT % 0     Results from last 7 days   Lab Units 22  1828   SODIUM mmol/L 137 POTASSIUM mmol/L 4 2   CHLORIDE mmol/L 104   CO2 mmol/L 22   BUN mg/dL 25   CREATININE mg/dL 1 10   ANION GAP mmol/L 11   CALCIUM mg/dL 10 2*   ALBUMIN g/dL 3 8   TOTAL BILIRUBIN mg/dL 0 54   ALK PHOS U/L 120*   ALT U/L 36   AST U/L 29   GLUCOSE RANDOM mg/dL 105     Results from last 7 days   Lab Units 04/20/22  1828   INR  1 06             Results from last 7 days   Lab Units 04/21/22  0513 04/20/22  1828   LACTIC ACID mmol/L  --  1 3   PROCALCITONIN ng/ml 0 68* 0 13       Lines/Drains:  Invasive Devices  Report    Peripheral Intravenous Line            Peripheral IV 04/21/22 Right Forearm <1 day          Drain            External Urinary Catheter Medium 715 days                      Imaging: Reviewed radiology reports from this admission including: chest xray    Recent Cultures (last 7 days):   Results from last 7 days   Lab Units 04/20/22  1828   BLOOD CULTURE  Received in Microbiology Lab  Culture in Progress  Received in Microbiology Lab  Culture in Progress  Last 24 Hours Medication List:   Current Facility-Administered Medications   Medication Dose Route Frequency Provider Last Rate    acetaminophen  650 mg Oral Q6H PRN IASI Lopez-C      aluminum-magnesium hydroxide-simethicone  30 mL Oral Q6H PRN ISAI Lopez-C      aspirin  81 mg Oral Daily ISAI Lopez-PENNY      azithromycin  500 mg Oral Q24H Marleni Ha, PA-C      cefTRIAXone  1,000 mg Intravenous Q24H ISAI Lopez-C      enoxaparin  40 mg Subcutaneous Daily ISAI Lopez-PENNY      guaiFENesin  200 mg Oral Q4H PRN ISAI Lopez-PENNY      melatonin  3 mg Oral HS PRN Marleni Ha, PA-C      nicotine  1 patch Transdermal Daily Marleni Ha PA-C          Today, Patient Was Seen By: Guadalupe Garcia MD    **Please Note: This note may have been constructed using a voice recognition system  **

## 2022-04-21 NOTE — ASSESSMENT & PLAN NOTE
· Most likely acute metabolic encephalopathy secondary to pneumonia  · CT head negative  · With initiation of antibiotics, patient is back to baseline mental status

## 2022-04-21 NOTE — RESPIRATORY THERAPY NOTE
RT Protocol Note  Abad Larios 80 y o  male MRN: [id number]  Unit/Bed#: ED 08 Encounter: [id number]    Assessment    Principal Problem:    CAP (community acquired pneumonia)  Active Problems:    CAD (coronary artery disease)    [facility]    AMS (altered mental status)      Home Pulmonary Medications:  N/a         Past Medical History:   Diagnosis Date    Bladder cancer ([facility] )     CAD (coronary artery disease)     Dyslipidemia     History of heart artery stent     Umbilical hernia      Social History     Socioeconomic History    Marital status:      Spouse name: Not on file    Number of children: Not on file    Years of education: Not on file    Highest education level: Not on file   Occupational History    Not on file   Tobacco Use    Smoking status: Current Every Day Smoker     Packs/day: 0 50    Smokeless tobacco: Never Used   Vaping Use    Vaping Use: Never used   Substance and Sexual Activity    Alcohol use: Yes    Drug use: Never    Sexual activity: Not Currently   Other Topics Concern    Not on file   Social History Narrative    Not on file     Social Determinants of Health     Financial Resource Strain: Not on file   Food Insecurity: Not on file   Transportation Needs: Not on file   Physical Activity: Not on file   Stress: Not on file   Social Connections: Not on file   Intimate Partner Violence: Not on file   Housing Stability: Not on file       Subjective    Subjective Data: drowsy without apparent respiratory distress    Objective    Physical Exam:   Assessment Type: Assess only  General Appearance: Drowsy,Eyes open/responds to stimulus,Eyes open/responds to voice  Respiratory Pattern: Shallow,Spontaneous  Chest Assessment: Chest expansion symmetrical  Bilateral Breath Sounds: Diminished  Cough: None  O2 Device: room air    Vitals:  Blood pressure 133/60, pulse 73, temperature 97 6 °F (36 4 °C), temperature source Oral, resp   rate 21, weight 65 7 kg (144 lb 13 5 oz), SpO2 93 %           Imaging and other studies: I have personally reviewed pertinent reports  O2 Device: room air     Plan    Respiratory Plan: No distress/Pulmonary history        Resp Comments: respiratory protocol completed at this time patient admitted for CAP   patient has no significant pulmonary PMH no medications taken at home no apparent respiratory distress at this time no indication or request for aerosol therapy needed at this time

## 2022-04-21 NOTE — PHYSICAL THERAPY NOTE
Physical Therapy Evaluation   Time in: 1217  Time out: 1237  Total evaluation time: 20 minutes    Patient's Name: Bishop Quintanilla    Admitting Diagnosis  CAP (community acquired pneumonia) [J18 9]  Acute encephalopathy [G93 40]  AMS (altered mental status) [R41 82]    Problem List  Patient Active Problem List   Diagnosis    [facility]ontoid fracture with type II morphology ([facility] )    Multiple abrasions    Nasal bones, closed fracture    Atelectasis    Pulmonary nodule    Renal cyst    Liver cyst    Delirium    Fall    [facility]    CAD (coronary artery disease)    H/O heart artery stent    Umbilical hernia    CAP (community acquired pneumonia)    Sepsis ([facility] )    AMS (altered mental status)       Past Medical History  Past Medical History:   Diagnosis Date    [facility]     CAD (coronary artery disease)     Dyslipidemia     History of heart artery stent     Umbilical hernia        Past Surgical History  Past Surgical History:   Procedure Laterality Date    BLADDER SURGERY      CORONARY ANGIOPLASTY WITH STENT PLACEMENT      TRANSODONTOID FUSION N/A 5/4/2020    Procedure: Transodontoid Fusion;  Surgeon: Jack Márquez MD;  [facility];  Service: Neurosurgery       PT performed at least 2 patient identifiers during session: Name and wristband  04/21/22 1218   PT Last Visit   PT Visit Date 04/21/22   Note Type   Note type Evaluation   Pain Assessment   Pain Assessment Tool 0-10   Pain Score No Pain   Restrictions/Precautions   Weight Bearing Precautions Per Order No   Braces or Orthoses Other (Comment)  (wears elastic support waist band prn for comfort)   Other Precautions Impulsive;Cognitive; Chair Alarm; Bed Alarm; Fall Risk   Home Living   Type of [address] Two level;Performs ADLs on one level; Able to live on main level with bedroom/bathroom  (0 RICHI & 1st floor setup/ pt's son on 2nd floor)   Bathroom Shower/Tub Tub/shower unit   Bathroom Equipment Grab bars in shower  (?shower chair)   [facility]  (RW used at baseline)   Prior Function   Level of [place] Independent with ADLs and functional mobility   Lives With Son  (works during daytime, pt home alone)   Receives Help From Family  (h/o HHPT per pt)   ADL Assistance Independent   IADLs Independent  (splits with son per pt)   Falls in the last 6 months 1 to 4  (1 fall per pt)   Vocational Retired   Comments information listed above obtained from pt at time of assessment  pt poor historian, CM to follow up  (-) , pt's son A with transportation  pt reports he enjoys watching tv   General   Family/Caregiver Present No   Cognition   Overall Cognitive Status Impaired   Arousal/Participation Alert   Orientation Level Oriented to person;Disoriented to place; Disoriented to time;Disoriented to situation   Memory Decreased recall of biographical information;Decreased short term memory;Decreased recall of recent events;Decreased recall of precautions   Following Commands Follows multistep commands with increased time or repetition   Comments pt agreeable to PT eval   Subjective   Subjective "I want to go for a walk" "why'd they give me wine in my drink cup?"   RUE Assessment   RUE Assessment   (defer to OT eval for comments)   LUE Assessment   LUE Assessment   (defer to OT eval for comments)   RLE Assessment   RLE Assessment   (grossly 4-/5)   LLE Assessment   LLE Assessment   (grossly 4-/5)   Coordination   Movements are Fluid and Coordinated 1   Sensation [facility]   Bed Mobility   Additional Comments pt received EOB upon arrival   Transfers   Sit to Stand 4  Minimal assistance   Additional items Assist x 1; Impulsive;Verbal cues   Stand to Sit 4  Minimal assistance   Additional items Assist x 1; Impulsive;Verbal cues   Ambulation/Elevation   Gait pattern Decreased foot clearance; Short stride;Knees flexed  (rigid trunk)   Gait Assistance 4  Minimal assist   Additional items Assist x 1;Verbal cues   Assistive Device Rolling walker   Distance 50'   Balance   Static Sitting Fair +   Dynamic Sitting Fair   Static Standing Fair   Dynamic Standing Fair -   Ambulatory Fair -   Endurance Deficit   Endurance Deficit Yes   Activity Tolerance   Activity Tolerance Patient limited by fatigue   Medical Staff Made Aware RICO Harden  discussed case with CM Anna Marie   Nurse Made Aware UYEN Melendez   Assessment   Prognosis Good   Problem List Decreased strength;Decreased endurance; Impaired balance;Decreased mobility; Decreased cognition;Decreased safety awareness   Assessment Pt is 80 y o  male seen for high-complexity PT evaluation on 4/21/2022 s/p admit to [facility] on 4/20/2022 w/ CAP (community acquired pneumonia)  PT was consulted to assess pt's functional mobility and d/c needs  Order placed for PT eval and tx, w/ up and OOB as tolerated order  PTA, pt resides with son in [place], 1st floor setup, 0 RICHI, ambulates with RW, +fall history  Pt poor historian, CM to follow up  At time of eval, pt performing transfers and level surface gait trial at min A level, use of RW for level surface gait  Upon evaluation, pt presenting with impaired functional mobility d/t decreased strength, decreased endurance, impaired balance, decreased mobility, decreased cognition, decreased safety awareness and activity intolerance  Pertinent PMHx and current co-morbidities affecting pt's physical performance at time of assessment include: CAP, CAD, sepsis, AMS, h/o odontoid fracture, renal cyst, liver cyst, delirium, h/o falls, bladder cancer, umbilical hernia  Personal factors affecting pt at time of eval include: lives in 2 story house, inability to navigate community distances, decreased cognition, positive fall history and limited insight into impairments   The following objective measures performed on IE also reveal limitations: Barthel Index: 35/100, Modified [place]: 4 (moderate/severe disability) and AM-PAC 6-Clicks: 17/24  Pt's clinical presentation is currently unstable/unpredictable seen in pt's presentation of advanced age, abnormal lab value(s), need for input for task focus and mobility technique and ongoing medical assessment  Overall, pt's rehab potential and prognosis to return to PLOF is good as impacted by objective findings, warranting pt to receive further skilled PT interventions to address identified impairments, activity limitation(s), and participation restriction(s)  Pt to benefit from continued PT tx to address deficits as defined above and maximize level of functional independent mobility  From PT/mobility standpoint, recommendation at time of d/c would be post acute rehabilitation services vs HHPT pending progress in order to facilitate return to PLOF  Barriers to Discharge Inaccessible home environment;Decreased caregiver support   Goals   Patient Goals to go home   STG Expiration Date 05/01/22   Short Term Goal #1 In 7-10 days: Increase bilateral LE strength 1/2 grade to facilitate independent mobility, Perform all bed mobility tasks modified independent to decrease caregiver burden, Perform all transfers modified independent to improve independence, Ambulate > 150 ft  with least restrictive assistive device modified independent w/o LOB and w/ normalized gait pattern 100% of the time, Increase all balance 1/2 grade to decrease risk for falls, Tolerate 4 hr OOB to faciliate upright tolerance, Improve Barthel Index score to 50 or greater to facilitate independence and PT provider will perform functional balance assessment to determine fall risk   PT Treatment Day 1   Plan   Treatment/Interventions Functional transfer training;LE strengthening/ROM; Therapeutic exercise; Endurance training;Patient/family training;Equipment eval/education; Bed mobility;Gait training;Spoke to nursing;Spoke to case management;OT   PT Frequency 3-5x/wk   Recommendation   PT Discharge Recommendation Post acute rehabilitation services  (vs HHPT pending progress/ baseline support)   Equipment Recommended Walker  (continue with RW)   AM-PAC Basic Mobility Inpatient   Turning in Bed Without Bedrails 3   Lying on Back to Sitting on Edge of Flat Bed 3   Moving Bed to Chair 3   Standing Up From Chair 3   Walk in Room 3   Climb 3-5 Stairs 2   Basic Mobility Inpatient Raw Score 17   Basic Mobility Standardized Score 39 67   Highest Level Of Mobility   -[facility] Goal 5: Stand one or more mins   -[facility] Highest Level of Mobility 7: Walk 25 feet or more   -[facility] Goal Achieved Yes   Modified [place] Scale   Modified [place] Scale 4   Barthel Index   Feeding 5   Bathing 0   Grooming Score 0   Dressing Score 5   Bladder Score 5   Bowels Score 5   Toilet Use Score 5   Transfers (Bed/Chair) Score 10   Mobility (Level Surface) Score 0   Stairs Score 0   Barthel Index Score 35   Additional Treatment Session   Start Time 1237   End Time 1248   Treatment Assessment Pt seen for PT treatment session this date s/p PT eval, consisting of ther ex focused on strengthening  Current goals and POC remain appropriate, pt continues to have rehab potential and is making progress towards STGs  Pt prognosis for achieving goals is good, pending pt progress with hospitalization/medical status improvements, and indicated by recent onset and previous response to intervention  Pt limited d/t poor orientation and lack of self-control (impulsivity)  PT recommends post acute rehabilitation services upon discharge  Pt continues to be functioning below baseline level, and remains limited 2* factors listed above  PT will continue to see pt during current hospitalization in order to address the deficits listed above and provide interventions consistent w/ POC in effort to achieve STGs  Exercises   Heelslides Sitting;10 reps;AROM; Bilateral   Hip Abduction Sitting;10 reps;AROM; Bilateral   Knee AROM Long Arc Quad Sitting;10 reps;AROM; Bilateral   Ankle Pumps Sitting;10 reps;AROM; Bilateral Marching Sitting;10 reps;AROM; Bilateral       Libby Rawls, PT, DPT

## 2022-04-21 NOTE — PLAN OF CARE
Problem: OCCUPATIONAL THERAPY ADULT  Goal: Performs self-care activities at highest level of function for planned discharge setting  See evaluation for individualized goals  Description: Treatment Interventions: ADL retraining,Functional transfer training,Endurance training,Cognitive reorientation,Patient/family training,Compensatory technique education,Continued evaluation,Activityengagement,Energy conservation          See flowsheet documentation for full assessment, interventions and recommendations  Note: Limitation: Decreased ADL status,Decreased Safe judgement during ADL,Decreased cognition,Decreased endurance,Decreased self-care trans,Decreased high-level ADLs     Assessment: Patient is a 80 y o  male seen for OT evaluation s/p admit to [id number] [place] [address] on 4/20/2022 w/CAP (community acquired pneumonia)  Commorbidities affecting patient's functional performance at time of assessment include: AMS, sepsis, and CAD  Patient  has a past medical history of AMS (altered mental status) (4/20/2022), Bladder cancer ([facility] ), CAD (coronary artery disease), Dyslipidemia, History of heart artery stent, and Umbilical hernia  Orders placed for OT evaluation and treatment  Performed at least two patient identifiers during session including name and wristband  Prior to admission, patient was living with his son in a two-story home with 0 RICHI and a first-floor setup  At baseline, patient reports that he is independent in ADLs and shares responsibilities with son for performing IADLs  Personal factors affecting patient at time of initial evaluation include: limited caregiver support, limited insight into deficits, difficulty performing ADLs, difficulty performing IADLs and health management   Upon evaluation, patient requires supervision, set up and minimal assist for UB ADLs, minimal assist for LB ADLs, transfers and functional ambulation in room and bathroom with contact guard and minimal assist, with the use of Rolling Walker  Patient is alert, oriented to name,, disoriented to time,, disoriented to place, and disoriented to situation,  Occupational performance is affected by the following deficits: orientation, impaired gross motor coordination, dynamic sit/ stand balance deficit with poor standing tolerance time for self care and functional mobility, decreased activity tolerance, impaired memory, impaired problem solving and decreased safety awareness  Patient to benefit from continued Occupational Therapy treatment while in the hospital to address deficits as defined above and maximize level of functional independence with ADLs and functional mobility  Occupational Performance areas to address include: grooming , bathing/ shower, dressing, toilet hygiene, transfer to all surfaces, functional mobility, emergency response, health maintenance, IADLs: safety procedures and Leisure Participation  From OT standpoint, recommendation at time of d/c would be post-acute rehabilitation services versus 24 hour supervision/ assist, Home with family support and Home OT pending patient progress         OT Discharge Recommendation: Post acute rehabilitation services

## 2022-04-21 NOTE — ASSESSMENT & PLAN NOTE
· Secondary to community acquired pneumonia  · Continue antibiotics, follow up remaining infectious work up  · Sepsis resolved

## 2022-04-21 NOTE — CASE MANAGEMENT
Case Management Assessment & Discharge Planning Note    Patient name Zainab Stevenson  Location [address] [id number] MRN [id number]  : 1935 Date 2022       Current Admission Date: 2022  Current Admission Diagnosis:CAP (community acquired pneumonia)   Patient Active Problem List    Diagnosis Date Noted    CAP (community acquired pneumonia) 2022    Sepsis ([facility] ) 2022    AMS (altered mental status) 2022    Delirium 2020    Fall 2020    [facility] ) 2020    CAD (coronary artery disease) 2020    H/O heart artery stent     Umbilical hernia     Odontoid fracture with type II morphology ([place] [address] ) 2020    Multiple abrasions 2020    Nasal bones, closed fracture 2020    Atelectasis 2020    Pulmonary nodule 2020    Renal cyst 2020    Liver cyst 2020      LOS (days): 1  Geometric Mean LOS (GMLOS) (days): 4 80  Days to GMLOS:4 1     OBJECTIVE:    Risk of Unplanned Readmission Score: 10         Current admission status: Inpatient       Preferred Pharmacy:   [id number] - [place] - [address] [place] [id number]  Phone: [phone] Fax: [phone]    Primary Care Provider: Maria Isabel Vance PA-C    Primary Insurance: MEDICARE  Secondary Insurance:     ASSESSMENT:  [address] [place] [address], [address] [facility] - Son   Primary Phone: [phone] (Mobile)               Advance Directives  Does patient have a [address]?: Yes  Does patient have Advance Directives?: Yes  Advance Directives: Living will,Power of  for health care,Power of  for finance  Primary Contact: ahmet Guy         Readmission Root Cause  30 Day Readmission: No    Patient Information  Admitted from[de-identified] Home  Mental Status: Confused,Alert  During Assessment patient was accompanied by: Not accompanied during assessment  Assessment information provided by[de-identified] Son  Primary Caregiver: Self (Son states he is independant with washing and dressing)  Support Systems: Son  [place] of Residence: [place] [address] do you live in?: [address] entry access options   Select all that apply : No steps to enter home  Type of Current Residence: 2 story home  Upon entering residence, is there a bedroom on the main floor (no further steps)?: Yes  Upon entering residence, is there a bathroom on the main floor (no further steps)?: Yes  In the last 12 months, was there a time when you were not able to pay the mortgage or rent on time?: No  In the last 12 months, how many places have you lived?: 1  In the last 12 months, was there a time when you did not have a steady place to sleep or slept in a shelter (including now)?: No  Homeless/housing insecurity resource given?: N/A  Living Arrangements: Lives w/ Son  Is patient a ?: Yes  Is patient active with [facility])?: Yes  Is patient service connected?: No    Activities of Daily Living Prior to Admission  Functional Status: Independent  Completes ADLs independently?: Yes  Ambulates independently?: Yes  Does patient use assisted devices?: Yes  Assisted Devices (DME) used: Ashly Rodgers  Does patient currently own DME?: Yes  What DME does the patient currently own?: Narda Cohn (Comment) (electric W/C)  Does patient have a history of Outpatient Therapy (PT/OT)?: No  Does the patient have a history of Short-Term Rehab?: Yes ([address])  Does patient have a history of HHC?: Yes  Does patient currently have [facility]?: No         Patient Information Continued  Income Source: Unemployed  Does patient have prescription coverage?: No  Within the past 12 months, you worried that your food would run out before you got the money to buy more : Never true  Within the past 12 months, the food you bought just didnt last and you didnt have money to get more : Never true  Food insecurity resource given?: N/A  Does patient receive dialysis treatments?: No  Does patient have a history of substance abuse?: No  Does patient have a history of Mental Health Diagnosis?: No         Means of Transportation  Means of Transport to Appts[de-identified] Family transport  In the past 12 months, has lack of transportation kept you from medical appointments or from getting medications?: No  In the past 12 months, has lack of transportation kept you from meetings, work, or from getting things needed for daily living?: No  Was application for public transport provided?: N/A        DISCHARGE DETAILS:    Discharge planning discussed with[de-identified] Darian Sparks of Choice: Yes  Comments - Freedom of Choice: Son does not want pt to go to a STR  CM contacted family/caregiver?: Yes  Were Treatment Team discharge recommendations reviewed with patient/caregiver?: Yes  Did patient/caregiver verbalize understanding of patient care needs?: Yes  Were patient/caregiver advised of the risks associated with not following Treatment Team discharge recommendations?: Yes    Contacts  Patient Contacts: Jas Dennis son  Relationship to Patient[de-identified] Family  Contact Method: Phone  Phone Number: [phone]  Reason/Outcome: Continuity of [facility]         Is the patient interested in [facility] at discharge?: Yes  [address] requested[de-identified] [facility] Name[de-identified] Other (referrals sent)  [address] [place] [address] Provider[de-identified] PCP  Home Health Services Needed[de-identified] Evaluate Functional Status and Safety,Gait/ADL Training,Strengthening/Theraputic Exercises to Improve Function  Homebound Criteria Met[de-identified] Uses an Assist Device (i e  cane, walker, etc)  Supporting Clincal Findings[de-identified] Dyspnea with Exertion,Fatigues Easliy in Short Distances,Limited Endurance    DME Referral Provided  Referral made for DME?: No    Other Referral/Resources/Interventions Provided:  Interventions: [facility]  Referral Comments:  Aon refuses STR         Treatment Team Recommendation: Home with [facility]  Discharge Destination Plan[de-identified] Home with Wali at Discharge : [facility]

## 2022-04-21 NOTE — ASSESSMENT & PLAN NOTE
· Most likely acute metabolic encephalopathy secondary to pneumonia  · CT head negative  · Chest x-ray revealing right-sided patchiness, will treat for bacterial pneumonia  · See plan under community-acquired pneumonia of right lung  · Fall precautions, delirium precautions

## 2022-04-21 NOTE — OCCUPATIONAL THERAPY NOTE
Occupational Therapy Evaluation Note        Patient Name: Jose De La Rosa  VRSTF'L Date: 4/21/2022 04/21/22 1232   OT Last Visit   OT Visit Date 04/21/22   Note Type   Note type Evaluation   Restrictions/Precautions   Weight Bearing Precautions Per Order No   Braces or Orthoses Other (Comment)  (wears elastic support waist band prn for comfort)   Other Precautions Impulsive;Cognitive; Chair Alarm; Bed Alarm; Fall Risk   Pain Assessment   Pain Assessment Tool 0-10   Pain Score No Pain   Home Living   Type of Home House   Home Layout Two level;Performs ADLs on one level; Able to live on main level with bedroom/bathroom; Other (Comment)  (0 RICHI & 1st floor setup/pt's son on 2nd floor)   Bathroom Shower/Tub Tub/shower unit   Bathroom Toilet Standard   Bathroom Equipment Grab bars in shower  (? shower chair)   [facility]  (RW used at baseline)   Prior Function   Level of Bond Independent with ADLs and functional mobility   Lives With Son  (works during daytime, pt home alone)   Receives Help From Family  (h/o HHPT per pt)   ADL Assistance Independent   IADLs Independent  (splits w/ son per pt)   Falls in the last 6 months 1 to 4  (1 fall per pt)   Vocational Retired   Comments information listed above obtained from pt at time of assessment  pt poor historian, CM to follow up  (-) , pt's son A with transportation   Lifestyle   Autonomy Per patient report, he lives with his son in a two-story home with 0 RICHI and a first-floor setup  At baseline, patient reports that he is primarily independent in both ADLs and IADLs and is ambulatory with a walker  Patient is a poor historian, information listed above obtained from patient at time of IE, CM to follow up to ensure accuracy     Reciprocal Relationships Family   Service to Others Retired   Intrinsic Gratification Pt reports that he enjoys watching TV   Psychosocial   Psychosocial (WDL) WDL   Patient Behaviors/Mood Cooperative;Pleasant   Ability to Express Feelings Able to express   Ability to Express Needs Able to express   Ability to Express Thoughts Able to express   Ability to Understand Others Sometimes understands   ADL   Eating Assistance 5  Supervision/Setup   Grooming Assistance 5  Supervision/Setup   UB Bathing Assistance 4  Minimal Assistance   LB Bathing Assistance 4  Minimal Assistance   1 [facility]  5  [facility] 4  Minimal Assistance   Additional Comments ADL assist levels based on pt's functional performance during OT evaluation   Bed Mobility   Additional Comments Patient received seated EOB upon OT arrival; at end of session: pt left in care of PT   Transfers   Sit to Stand 4  Minimal assistance   Additional items Assist x 1; Impulsive;Verbal cues   Stand to Sit 4  Minimal assistance   Additional items Assist x 1; Impulsive;Verbal cues   Toilet transfer 4  Minimal assistance  (CGA)   Additional items Assist x 1; Increased time required;Verbal cues;Standard toilet  (Grab bar use)   Additional Comments Performed functional transfers using RW  Patient with noted impulsivity and decreased safety awareness during transitional movements     Functional Mobility   Functional Mobility 4  Minimal assistance   Additional Comments CGA x1; Ambulation within pt room, bathroom, and hallway with no overt LOB or SOB   Additional items Rolling walker   Balance   Static Sitting Fair +   Dynamic Sitting Fair   Static Standing Fair   Dynamic Standing Fair -   Ambulatory Fair -   Activity Tolerance   Activity Tolerance Patient limited by fatigue   Medical Staff Made Aware  [address]   Nurse Made Aware UYEN Infante confirmed pt appropriate for therapy and made aware of session outcomes   RUE Assessment   RUE Assessment WFL  (AROM and strength WFL based on formal assessment)   LUE Assessment LUE Assessment WFL  (AROM and strength WFL based on formal assessment)   Hand Function   Gross Motor Coordination Impaired  (Impaired finger to nose test)   Fine Motor Coordination   (Further assessment required)   Sensation   Light Touch No apparent deficits  (BUEs)   Additional Comments Patient denies diminished sensation t/o BUEs   Vision-Basic Assessment   Current Vision Wears glasses all the time  (Not present at time of IE)   Cognition   Overall Cognitive Status Impaired   Arousal/Participation Alert; Responsive; Cooperative   Attention Attends with cues to redirect   Orientation Level Oriented to person;Disoriented to place; Disoriented to time;Disoriented to situation   Memory Decreased recall of biographical information;Decreased short term memory;Decreased recall of recent events;Decreased recall of precautions   Following Commands Follows multistep commands with increased time or repetition   Comments Patient agreeable to OT evaluation   Assessment   Limitation Decreased ADL status; Decreased Safe judgement during ADL;Decreased cognition;Decreased endurance;Decreased self-care trans;Decreased high-level ADLs   Assessment Patient is a 80 y o  male seen for OT evaluation s/p admit to [id number] [facility] on 4/20/2022 w/CAP (community acquired pneumonia)  Commorbidities affecting patient's functional performance at time of assessment include: AMS, sepsis, and CAD  Patient  has a past medical history of AMS (altered mental status) (4/20/2022), Bladder cancer ([facility] [address] ), CAD (coronary artery disease), Dyslipidemia, History of heart artery stent, and Umbilical hernia  Orders placed for OT evaluation and treatment  Performed at least two patient identifiers during session including name and wristband  Prior to admission, patient was living with his son in a two-story home with 0 RICHI and a first-floor setup   At baseline, patient reports that he is independent in ADLs and shares responsibilities with son for performing IADLs  Personal factors affecting patient at time of initial evaluation include: limited caregiver support, limited insight into deficits, difficulty performing ADLs, difficulty performing IADLs and health management  Upon evaluation, patient requires supervision, set up and minimal assist for UB ADLs, minimal assist for LB ADLs, transfers and functional ambulation in room and bathroom with contact guard and minimal assist, with the use of [address]  Patient is alert, oriented to name,, disoriented to time,, disoriented to place, and disoriented to situation,  Occupational performance is affected by the following deficits: orientation, impaired gross motor coordination, dynamic sit/ stand balance deficit with poor standing tolerance time for self care and functional mobility, decreased activity tolerance, impaired memory, impaired problem solving and decreased safety awareness  Patient to benefit from continued Occupational Therapy treatment while in the hospital to address deficits as defined above and maximize level of functional independence with ADLs and functional mobility  Occupational Performance areas to address include: grooming , bathing/ shower, dressing, toilet hygiene, transfer to all surfaces, functional mobility, emergency response, health maintenance, IADLs: safety procedures and Leisure Participation  From OT standpoint, recommendation at time of d/c would be post-acute rehabilitation services versus 24 hour supervision/ assist, Home with family support and Home OT pending patient progress  Goals   Patient Goals to return home   Plan   Treatment Interventions ADL retraining;Functional transfer training; Endurance training;Cognitive reorientation;Patient/family training; Compensatory technique education;Continued evaluation; Activityengagement; Energy conservation   Goal Expiration Date 05/05/22   OT Treatment Day 0   OT Frequency 3-5x/wk   Recommendation   OT Discharge Recommendation Post acute rehabilitation services   AM-PAC Daily Activity Inpatient   Lower Body Dressing 3   Bathing 3   Toileting 3   Upper Body Dressing 3   Grooming 3   Eating 3   Daily Activity Raw Score 18   Daily Activity Standardized Score (Calc for Raw Score >=11) 38 66   AM-PAC Applied Cognition Inpatient   Following a Speech/Presentation 2   Understanding Ordinary Conversation 3   Taking Medications 1   Remembering Where Things Are Placed or Put Away 2   Remembering List of 4-5 Errands 1   Taking Care of Complicated Tasks 1   Applied Cognition Raw Score 10   Applied Cognition Standardized Score 24 98   Barthel Index   Feeding 5   Bathing 0   Grooming Score 0   Dressing Score 5   Bladder Score 5   Bowels Score 5   Toilet Use Score 5   Transfers (Bed/Chair) Score 10   Mobility (Level Surface) Score 0   Stairs Score 0   Barthel Index Score 35   Modified Reji Scale   Modified [place] Scale 4     Occupational Therapy Goals to be completed in 7-14 Days:    1 - Patient will verbalize and demonstrate use of energy conservation/ deep breathing technique and work simplification skills during functional activity with minimal verbal cues  2 - Patient will verbalize and demonstrate good body mechanics and joint protection techniques during  ADLs/ IADLs with minimal verbal cues  3 - Patient will increase OOB/ sitting tolerance to 2-4 hours per day for increased participation in self care and leisure tasks with no s/s of exertion  4 - Patient will increase standing tolerance time to 10 minutes with unilateral UE support to complete sink level ADLs@ mod I level  5 - Patient will increase sitting tolerance at edge of bed to 30 minutes to complete UB ADLs @ set up assist level  6 - Patient will transfer bed to Chair / toilet at Mod I assist level with AD as indicated  7 - Patient will complete UB ADLs with set up assist      8 - Patient will complete LB ADLs with supervision/setup assist while seated       9 - Patient will complete toileting hygiene with Mod I assist/ supervision for thoroughness  8 - Patient/ Family will demonstrate competency with UE Home Exercise Program       11- Pt will attend to continued cognitive assessment 100% of the time in order to provide most appropriate recommendations for d/c plans  12- Patient will demonstrate good safety awareness during functional transfers, mobility, and ADLs 100% of the time with minimal verbal cues      Jose Sandoval OTR/L

## 2022-04-21 NOTE — OCCUPATIONAL THERAPY NOTE
Occupational Therapy Treatment Note      Patient Name: Luis Alfredo Orozco  ODXFS'B Date: 22 1401   OT Last Visit   OT Visit Date 22   Note Type   Note Type Treatment   Restrictions/Precautions   Weight Bearing Precautions Per Order No   Braces or Orthoses   (wears elastic support waist band prn for comfort)   Other Precautions Impulsive;Cognitive; Chair Alarm; Bed Alarm; Fall Risk   Bed Mobility   Additional Comments Patient received seated OOB to recliner chair upon OT arrival; at end of session: pt returned seated to recliner chair w/ all needs within reach and chair alarm activated   Transfers   Sit to Stand 4  Minimal assistance  (CGA)   Additional items Assist x 1; Armrests; Impulsive;Verbal cues   Stand to Sit 4  Minimal assistance   Additional items Assist x 1; Increased time required;Verbal cues;Armrests   Additional Comments Patient with noted impulsivity and decreased safety awareness during transitional movements  Cognition   Overall Cognitive Status Impaired   Arousal/Participation Alert; Responsive   Attention Attends with cues to redirect   Orientation Level Oriented to person;Disoriented to place; Disoriented to time;Disoriented to situation   Memory Decreased recall of biographical information;Decreased short term memory;Decreased recall of recent events;Decreased recall of precautions   Following Commands Follows one step commands with increased time or repetition   Comments Patient agreeable to OT treatment session  Administered MOCA at time of session, please see below for details     Cognition Assessment Tools MOCA   Score 5   Additional Activities   Additional Activities Other (Comment)  (Continued cognitive assessment)   Activity Tolerance   Activity Tolerance Patient tolerated treatment well   Jose David George made aware of session outcomes/recommendations via [employer]   Assessment   Assessment Patient participated in Skilled OT session this date with interventions consisting of continued cognitive assessment  Patient agreeable to OT treatment session, upon arrival patient was found seated OOB to Recliner, alert, responsive  and in no apparent distress  Patient participated in continued cognitive assessment, including administration of the [facility]; please see below for details and outcomes  Patient requiring frequent re direction  Patient continues to be functioning below baseline level, occupational performance remains limited secondary to factors listed above and increased risk for falls and injury  From OT standpoint, recommendation at time of d/c would be post-acute rehabilitation services versus 24 hour supervision/ assist, Home with family support and Home OT  Patient to benefit from continued Occupational Therapy treatment while in the hospital to address deficits as defined above and maximize level of functional independence with ADLs and functional mobility  Plan   Treatment Interventions ADL retraining;Functional transfer training; Endurance training;Cognitive reorientation;Patient/family training; Compensatory technique education;Continued evaluation; Energy conservation; Activityengagement   Goal Expiration Date 05/05/22   OT Treatment Day 1   OT Frequency 3-5x/wk   Recommendation   Recommendation Geriatric Consult; Other (comment)  (Neuropsych consult)   OT Discharge Recommendation Post acute rehabilitation services   AM-PAC Daily Activity Inpatient   Lower Body Dressing 3   Bathing 3   Toileting 3   Upper Body Dressing 3   Grooming 3   Eating 3   Daily Activity Raw Score 18   Daily Activity Standardized Score (Calc for Raw Score >=11) 38 66   AM-PAC Applied Cognition Inpatient   Following a Speech/Presentation 2   Understanding Ordinary Conversation 3   Taking Medications 1   Remembering Where Things Are Placed or Put Away 2   Remembering List of 4-5 Errands 1   Taking Care of Complicated Tasks 1   Applied Cognition Raw Score 10   Applied Cognition Standardized Score 24 98     Cognitive Assessment:    Patient seen for [facility] Cognitive Assessment  Patient score 5/30 indicating severe cognitive impairment for age/education  Scores are as follows:    Visual Spatial/Executive Function: 0/5  Patient unable to accurately complete trail  Patient was unable to copy cube with proper orientation  Patient was also unable to draw a clock, accurately place the numbers, and properly place the hands at ten past eleven  Naming: 3/3  Patient able to name 3/3 animals  Attention: 1/6  Patient was able to repeat sequence of numbers forwards, however unable to do so backwards  Patient was unable to attend to sequence of letters  Patient was unable to accurately subtract 7 from 100  Language: 0/3  Patient was unable to repeat the two sentences back to therapist  Patient unable to produce 11 words starting with letter F in 1 minute  Patient produced 3 different words beginning with letter F in the designated time  Abstraction: 0/2  Patient unable to identify the similarity between 2 sets of two items  Delayed recall: 0/5  Patient recalled 0/5 words without cues  Patient recalled 0/5 words with category cue and 2/5 words with multiple choice options  Calculated memory index score is 2/15  Orientation: 0/6  Patient is disoriented to date, month, year, day, place, and city  One point added for education  Patient required frequent redirection to task throughout administration and verbal cues for encouragement/emotional support for continued participation  Patient also required intermittent repetition of directions for various sections of the [address] [place] [address], [place]  Discussed the purpose of assessment with patient and pt's granddaughter, both verbalized understanding  Patient and family to benefit from further education and training on compensatory techniques to utilize for impaired areas of cognition as evidenced above      Additional Goals based on Above Findings:    1- Patient/family will be educated on use of internal and external memory aids and compensatory strategies with 80% accuracy to facilitate increased recall of routine and personal information vital to daily functioning and participation in functional tasks  2- Patient will demonstrate improved sustained attention skills by participating in a functional task for at least 10 minutes with min VCs for redirection      Aysha Samuel, OTR/L

## 2022-04-21 NOTE — PLAN OF CARE
Problem: Knowledge Deficit  Goal: Patient/family/caregiver demonstrates understanding of disease process, treatment plan, medications, and discharge instructions  Description: Complete learning assessment and assess knowledge base    Interventions:  - Provide teaching at level of understanding  - Provide teaching via preferred learning methods  Outcome: Progressing     Problem: DISCHARGE PLANNING  Goal: Discharge to home or other facility with appropriate resources  Description: INTERVENTIONS:  - Identify barriers to discharge w/patient and caregiver  - Arrange for needed discharge resources and transportation as appropriate  - Identify discharge learning needs (meds, wound care, etc )  - Arrange for interpretive services to assist at discharge as needed  - Refer to Case Management Department for coordinating discharge planning if the patient needs post-hospital services based on physician/advanced practitioner order or complex needs related to functional status, cognitive ability, or social support system  Outcome: Progressing     Problem: INFECTION - ADULT  Goal: Absence or prevention of progression during hospitalization  Description: INTERVENTIONS:  - Assess and monitor for signs and symptoms of infection  - Monitor lab/diagnostic results  - Monitor all insertion sites, i e  indwelling lines, tubes, and drains  - Monitor endotracheal if appropriate and nasal secretions for changes in amount and color  - [place] appropriate cooling/warming therapies per order  - Administer medications as ordered  - Instruct and encourage patient and family to use good hand hygiene technique  - Identify and instruct in appropriate isolation precautions for identified infection/condition  Outcome: Progressing  Goal: Absence of fever/infection during neutropenic period  Description: INTERVENTIONS:  - Monitor WBC    Outcome: Progressing     Problem: PAIN - ADULT  Goal: Verbalizes/displays adequate comfort level or baseline comfort level  Description: Interventions:  - Encourage patient to monitor pain and request assistance  - Assess pain using appropriate pain scale  - Administer analgesics based on type and severity of pain and evaluate response  - Implement non-pharmacological measures as appropriate and evaluate response  - Consider cultural and social influences on pain and pain management  - Notify physician/advanced practitioner if interventions unsuccessful or patient reports new pain  Outcome: Progressing

## 2022-04-21 NOTE — ASSESSMENT & PLAN NOTE
· Meeting criteria due to tachycardia and tachypnea most likely secondary to pneumonia  · Lactic acid WNL and procalcitonin WNL, blood culture x2 pending  · Continue antibiotics  · Blood pressure currently stable so will hold off on IV fluids at this time

## 2022-04-21 NOTE — PLAN OF CARE
Problem: Potential for Falls  Goal: Patient will remain free of falls  Description: INTERVENTIONS:  - Educate patient/family on patient safety including physical limitations  - Instruct patient to call for assistance with activity   - Consult OT/PT to assist with strengthening/mobility   - Keep Call bell within reach  - Keep bed low and locked with side rails adjusted as appropriate  - Keep care items and personal belongings within reach  - Initiate and maintain comfort rounds  - Make Fall Risk Sign visible to staff  - Offer Toileting every 2 Hours, in advance of need  - Initiate/Maintain chair and bed alarm  - Obtain necessary fall risk management equipment  - Apply yellow socks and bracelet for high fall risk patients  - Consider moving patient to room near nurses station  Outcome: Progressing     Problem: MOBILITY - ADULT  Goal: Maintain or return to baseline ADL function  Description: INTERVENTIONS:  -  Assess patient's ability to carry out ADLs; assess patient's baseline for ADL function and identify physical deficits which impact ability to perform ADLs (bathing, care of mouth/teeth, toileting, grooming, dressing, etc )  - Assess/evaluate cause of self-care deficits   - Assess range of motion  - Assess patient's mobility; develop plan if impaired  - Assess patient's need for assistive devices and provide as appropriate  - Encourage maximum independence but intervene and supervise when necessary  - Involve family in performance of ADLs  - Assess for home care needs following discharge   - Consider OT consult to assist with ADL evaluation and planning for discharge  - Provide patient education as appropriate  Outcome: Progressing  Goal: Maintains/Returns to pre admission functional level  Description: INTERVENTIONS:  - Perform BMAT or MOVE assessment daily    - Set and communicate daily mobility goal to care team and patient/family/caregiver     - Collaborate with rehabilitation services on mobility goals if consulted  - Perform Range of Motion 2 times a day    - Dangle patient 3 times a day  - Ambulate patient 3 times a day  - Out of bed to chair 3 times a day   - Out of bed for meals 2 times a day  - Out of bed for toileting  - Record patient progress and toleration of activity level   Outcome: Progressing     Problem: Prexisting or High Potential for Compromised Skin Integrity  Goal: Skin integrity is maintained or improved  Description: INTERVENTIONS:  - Identify patients at risk for skin breakdown  - Assess and monitor skin integrity  - Assess and monitor nutrition and hydration status  - Monitor labs   - Assess for incontinence   - Turn and reposition patient  - Assist with mobility/ambulation  - Relieve pressure over bony prominences  - Avoid friction and shearing  - Provide appropriate hygiene as needed including keeping skin clean and dry  - Evaluate need for skin moisturizer/barrier cream  - Collaborate with interdisciplinary team   - Patient/family teaching  - Consider wound care consult   Outcome: Progressing

## 2022-04-21 NOTE — PLAN OF CARE
Problem: MOBILITY - ADULT  Goal: Maintain or return to baseline ADL function  Description: INTERVENTIONS:  -  Assess patient's ability to carry out ADLs; assess patient's baseline for ADL function and identify physical deficits which impact ability to perform ADLs (bathing, care of mouth/teeth, toileting, grooming, dressing, etc )  - Assess/evaluate cause of self-care deficits   - Assess range of motion  - Assess patient's mobility; develop plan if impaired  - Assess patient's need for assistive devices and provide as appropriate  - Encourage maximum independence but intervene and supervise when necessary  - Involve family in performance of ADLs  - Assess for home care needs following discharge   - Consider OT consult to assist with ADL evaluation and planning for discharge  - Provide patient education as appropriate  Outcome: Progressing     Problem: Prexisting or High Potential for Compromised Skin Integrity  Goal: Skin integrity is maintained or improved  Description: INTERVENTIONS:  - Identify patients at risk for skin breakdown  - Assess and monitor skin integrity  - Assess and monitor nutrition and hydration status  - Monitor labs   - Assess for incontinence   - Turn and reposition patient  - Assist with mobility/ambulation  - Relieve pressure over bony prominences  - Avoid friction and shearing  - Provide appropriate hygiene as needed including keeping skin clean and dry  - Evaluate need for skin moisturizer/barrier cream  - Collaborate with interdisciplinary team   - Patient/family teaching  - Consider wound care consult   Outcome: Progressing     Problem: DISCHARGE PLANNING  Goal: Discharge to home or other facility with appropriate resources  Description: INTERVENTIONS:  - Identify barriers to discharge w/patient and caregiver  - Arrange for needed discharge resources and transportation as appropriate  - Identify discharge learning needs (meds, wound care, etc )  - Arrange for interpretive services to assist at discharge as needed  - Refer to Case Management Department for coordinating discharge planning if the patient needs post-hospital services based on physician/advanced practitioner order or complex needs related to functional status, cognitive ability, or social support system  Outcome: Progressing     Problem: Knowledge Deficit  Goal: Patient/family/caregiver demonstrates understanding of disease process, treatment plan, medications, and discharge instructions  Description: Complete learning assessment and assess knowledge base    Interventions:  - Provide teaching at level of understanding  - Provide teaching via preferred learning methods  Outcome: Progressing

## 2022-04-22 VITALS
SYSTOLIC BLOOD PRESSURE: 140 MMHG | TEMPERATURE: 97.8 F | HEIGHT: 70 IN | OXYGEN SATURATION: 94 % | WEIGHT: 133.16 LBS | BODY MASS INDEX: 19.06 KG/M2 | RESPIRATION RATE: 18 BRPM | DIASTOLIC BLOOD PRESSURE: 70 MMHG | HEART RATE: 64 BPM

## 2022-04-22 PROBLEM — A41.9 SEPSIS (HCC): Status: RESOLVED | Noted: 2022-04-20 | Resolved: 2022-04-22

## 2022-04-22 PROBLEM — R78.81 POSITIVE BLOOD CULTURE: Status: ACTIVE | Noted: 2022-04-22

## 2022-04-22 LAB
ANION GAP SERPL CALCULATED.3IONS-SCNC: 6 MMOL/L (ref 4–13)
BASOPHILS # BLD AUTO: 0.04 THOUSANDS/ΜL (ref 0–0.1)
BASOPHILS NFR BLD AUTO: 1 % (ref 0–1)
BUN SERPL-MCNC: 26 MG/DL (ref 5–25)
CALCIUM SERPL-MCNC: 10 MG/DL (ref 8.3–10.1)
CHLORIDE SERPL-SCNC: 107 MMOL/L (ref 100–108)
CO2 SERPL-SCNC: 24 MMOL/L (ref 21–32)
CREAT SERPL-MCNC: 1.06 MG/DL (ref 0.6–1.3)
EOSINOPHIL # BLD AUTO: 0.06 THOUSAND/ΜL (ref 0–0.61)
EOSINOPHIL NFR BLD AUTO: 1 % (ref 0–6)
ERYTHROCYTE [DISTWIDTH] IN BLOOD BY AUTOMATED COUNT: 14.2 % (ref 11.6–15.1)
GFR SERPL CREATININE-BSD FRML MDRD: 63 ML/MIN/1.73SQ M
GLUCOSE SERPL-MCNC: 88 MG/DL (ref 65–140)
HCT VFR BLD AUTO: 37.8 % (ref 36.5–49.3)
HGB BLD-MCNC: 12.9 G/DL (ref 12–17)
IMM GRANULOCYTES # BLD AUTO: 0.01 THOUSAND/UL (ref 0–0.2)
IMM GRANULOCYTES NFR BLD AUTO: 0 % (ref 0–2)
LYMPHOCYTES # BLD AUTO: 1.58 THOUSANDS/ΜL (ref 0.6–4.47)
LYMPHOCYTES NFR BLD AUTO: 23 % (ref 14–44)
MCH RBC QN AUTO: 31.9 PG (ref 26.8–34.3)
MCHC RBC AUTO-ENTMCNC: 34.1 G/DL (ref 31.4–37.4)
MCV RBC AUTO: 94 FL (ref 82–98)
MONOCYTES # BLD AUTO: 0.55 THOUSAND/ΜL (ref 0.17–1.22)
MONOCYTES NFR BLD AUTO: 8 % (ref 4–12)
NEUTROPHILS # BLD AUTO: 4.54 THOUSANDS/ΜL (ref 1.85–7.62)
NEUTS SEG NFR BLD AUTO: 67 % (ref 43–75)
NRBC BLD AUTO-RTO: 0 /100 WBCS
PLATELET # BLD AUTO: 186 THOUSANDS/UL (ref 149–390)
PMV BLD AUTO: 9.6 FL (ref 8.9–12.7)
POTASSIUM SERPL-SCNC: 4.3 MMOL/L (ref 3.5–5.3)
RBC # BLD AUTO: 4.04 MILLION/UL (ref 3.88–5.62)
SODIUM SERPL-SCNC: 137 MMOL/L (ref 136–145)
WBC # BLD AUTO: 6.78 THOUSAND/UL (ref 4.31–10.16)

## 2022-04-22 PROCEDURE — 99239 HOSP IP/OBS DSCHRG MGMT >30: CPT | Performed by: PHYSICIAN ASSISTANT

## 2022-04-22 PROCEDURE — 85025 COMPLETE CBC W/AUTO DIFF WBC: CPT | Performed by: STUDENT IN AN ORGANIZED HEALTH CARE EDUCATION/TRAINING PROGRAM

## 2022-04-22 PROCEDURE — 80048 BASIC METABOLIC PNL TOTAL CA: CPT | Performed by: STUDENT IN AN ORGANIZED HEALTH CARE EDUCATION/TRAINING PROGRAM

## 2022-04-22 RX ORDER — NICOTINE 21 MG/24HR
1 PATCH, TRANSDERMAL 24 HOURS TRANSDERMAL DAILY
Qty: 28 PATCH | Refills: 0 | Status: SHIPPED | OUTPATIENT
Start: 2022-04-22

## 2022-04-22 RX ORDER — CEPHALEXIN 500 MG/1
500 CAPSULE ORAL EVERY 6 HOURS SCHEDULED
Qty: 20 CAPSULE | Refills: 0 | Status: SHIPPED | OUTPATIENT
Start: 2022-04-22 | End: 2022-04-27

## 2022-04-22 RX ORDER — POLYVINYL ALCOHOL 14 MG/ML
1 SOLUTION/ DROPS OPHTHALMIC EVERY 6 HOURS PRN
Status: DISCONTINUED | OUTPATIENT
Start: 2022-04-22 | End: 2022-04-22 | Stop reason: HOSPADM

## 2022-04-22 RX ADMIN — ENOXAPARIN SODIUM 40 MG: 40 INJECTION SUBCUTANEOUS at 10:19

## 2022-04-22 RX ADMIN — NICOTINE 1 PATCH: 14 PATCH, EXTENDED RELEASE TRANSDERMAL at 10:24

## 2022-04-22 RX ADMIN — Medication 3 MG: at 01:09

## 2022-04-22 RX ADMIN — ASPIRIN 81 MG: 81 TABLET, CHEWABLE ORAL at 10:18

## 2022-04-22 NOTE — ASSESSMENT & PLAN NOTE
· Strep urine antigen + suggesting strep pneumonia  · This is day 2 rocephin can be dc home on keflex for 7 days total treatment  · Not requiring oxygen, no respiratory distress

## 2022-04-22 NOTE — ASSESSMENT & PLAN NOTE
· 4/20 - 1/2 BC growing Gram pos rods however BC ID panel shows No target organisms detected I suspect this is contaminant  · Will dc on keflex and of course adjust the regimen should the final BC change results

## 2022-04-22 NOTE — QUICK NOTE
Notified by RN 1 at of 2 blood cultures positive for Gram-positive rods, other with no growth    Continue to trend, possibility for contaminant    Will continue IV ceftriaxone for now, though

## 2022-04-22 NOTE — DISCHARGE SUMMARY
[facility]  Discharge- Ute Cho 1935, 80 y o  male MRN: [id number]  Unit/Bed#: -[facility] Encounter: [id number]  Primary Care Provider: Ganesh Phillips PA-C   Date and time admitted to hospital: 4/20/2022  6:08 PM    * CAP (community acquired pneumonia)  Assessment & Plan  · Strep urine antigen + suggesting strep pneumonia  · This is day 2 rocephin can be dc home on keflex for 7 days total treatment  · Not requiring oxygen, no respiratory distress    Positive blood culture  Assessment & Plan  · 4/20 - 1/2 BC growing Gram pos rods however BC ID panel shows No target organisms detected I suspect this is contaminant  · Will dc on keflex and of course adjust the regimen should the final BC change results    CAD (coronary artery disease)  Assessment & Plan  · Continue home aspirin    AMS (altered mental status)-resolved as of 4/21/2022  Assessment & Plan  · Most likely acute metabolic encephalopathy secondary to pneumonia  · CT head negative  · With initiation of antibiotics, patient is back to baseline mental status     Medical Problems             Resolved Problems  Date Reviewed: 4/20/2022          Resolved    AMS (altered mental status) 4/21/2022     Resolved by  Maureen Kong MD              Discharging Physician / Practitioner: Brett Lenz PA-C  PCP: Ganesh Phillips PA-C  Admission Date:   Admission Orders (From admission, onward)     Ordered        04/20/22 2000  Inpatient Admission  Once                      Discharge Date: 04/22/22    Consultations During Hospital Stay:  · none    Procedures Performed:   · none    Significant Findings / Test Results:   XR chest 2 views   ED Interpretation by Jenny Cerrato MD (04/20 1930)   Patchy infiltrates, L>R      Final Result by Elaine Barrera MD (04/21 0801)      Bibasilar opacities as discussed above                    Workstation performed: [id number]         CT head without contrast   Final Result by Dallas Castañeda MD (04/20 1903)      No acute intracranial abnormality  Workstation performed: [id number]         XR chest pa & lateral    (Results Pending)     Results Reviewed     Procedure Component Value Units Date/Time    Blood Culture Identification Panel [[id number]] Collected: 04/20/22 1828    Lab Status: Preliminary result Specimen: Blood from Hand, Right Updated: 04/22/22 0718     ALL TARGETS Not Detected    Blood culture #2 [[id number]]  (Abnormal) Collected: 04/20/22 1828    Lab Status: Preliminary result Specimen: Blood from Hand, Right Updated: 04/22/22 0335     Gram Stain Result Gram positive rods    Blood culture #1 [[id number]] Collected: 04/20/22 1828    Lab Status: Preliminary result Specimen: Blood from Arm, Left Updated: 04/21/22 2201     Blood Culture No Growth at 24 hrs      Strep Pneumoniae, Urine [[id number]]  (Abnormal) Collected: 04/20/22 1952    Lab Status: Final result Specimen: Urine, Clean Catch Updated: 04/21/22 1138     Strep pneumoniae antigen, urine Positive    Procalcitonin, Next Day AM Collection [[id number]]  (Abnormal) Collected: 04/21/22 0513    Lab Status: Final result Specimen: Blood from Arm, Right Updated: 04/21/22 0544     Procalcitonin 0 68 ng/ml     HS Troponin I 4hr [[id number]]  (Normal) Collected: 04/20/22 2223    Lab Status: Final result Specimen: Blood Updated: 04/20/22 2247     hs TnI 4hr 12 ng/L      Delta 4hr hsTnI 3 ng/L     HS Troponin I 2hr [[id number]]  (Normal) Collected: 04/20/22 2053    Lab Status: Final result Specimen: Blood from Arm, Right Updated: 04/20/22 2119     hs TnI 2hr 12 ng/L      Delta 2hr hsTnI 3 ng/L     Sputum culture and Gram stain [[id number]]     Lab Status: No result Specimen: Sputum     Urine Microscopic [[id number]]  (Abnormal) Collected: 04/20/22 1952    Lab Status: Final result Specimen: Urine, Clean Catch Updated: 04/20/22 2011     RBC, UA 2-4 /hpf      WBC, UA 2-4 /hpf      Epithelial Cells Occasional /hpf Bacteria, UA None Seen /hpf      MUCUS THREADS Occasional    UA w Reflex to Microscopic w Reflex to Culture [[id number]]  (Abnormal) Collected: 04/20/22 1952    Lab Status: Final result Specimen: Urine, Clean Catch Updated: 04/20/22 2000     Color, UA Yellow     Clarity, UA Clear     Specific Gravity, UA 1 025     pH, UA 7 0     Leukocytes, UA Negative     Nitrite, UA Negative     Protein, UA 30 (1+) mg/dl      Glucose, UA Negative mg/dl      Ketones, UA Negative mg/dl      Urobilinogen, UA 0 2 E U /dl      Bilirubin, UA Negative     Blood, UA Trace-Intact    COVID/FLU/RSV - 2 hour TAT [[id number]]  (Normal) Collected: 04/20/22 1828    Lab Status: Final result Specimen: Nares from Nasopharyngeal Swab Updated: 04/20/22 1916     SARS-CoV-2 Negative     INFLUENZA A PCR Negative     INFLUENZA B PCR Negative     RSV PCR Negative    Narrative:      FOR PEDIATRIC PATIENTS - copy/paste COVID Guidelines URL to browser: [email or web address]  [employer]    SARS-CoV-2 assay is a Nucleic Acid Amplification assay intended for the  qualitative detection of nucleic acid from SARS-CoV-2 in nasopharyngeal  swabs  Results are for the presumptive identification of SARS-CoV-2 RNA  Positive results are indicative of infection with SARS-CoV-2, the virus  causing COVID-19, but do not rule out bacterial infection or co-infection  with other viruses  Laboratories within the [place] and its  territories are required to report all positive results to the appropriate  public health authorities  Negative results do not preclude SARS-CoV-2  infection and should not be used as the sole basis for treatment or other  patient management decisions  Negative results must be combined with  clinical observations, patient history, and epidemiological information  This test has not been FDA cleared or approved  This test has been authorized by FDA under an Emergency Use Authorization  (EUA)   This test is only authorized for the duration of time the  declaration that circumstances exist justifying the authorization of the  emergency use of an in vitro diagnostic tests for detection of SARS-CoV-2  virus and/or diagnosis of COVID-19 infection under section 564(b)(1) of  the Act, 21 U  S C  783KHX-2(D)(5), unless the authorization is terminated  or revoked sooner  The test has been validated but independent review by FDA  and CLIA is pending  Test performed using [employer] GeneXpert: This RT-PCR assay targets N2,  a region unique to SARS-CoV-2  A conserved region in the E-gene was chosen  for pan-Sarbecovirus detection which includes SARS-CoV-2      Procalcitonin [[id number]]  (Normal) Collected: 04/20/22 1828    Lab Status: Final result Specimen: Blood from Arm, Left Updated: 04/20/22 1903     Procalcitonin 0 13 ng/ml     HS Troponin 0hr (reflex protocol) [[id number]]  (Normal) Collected: 04/20/22 1828    Lab Status: Final result Specimen: Blood from Arm, Left Updated: 04/20/22 1902     hs TnI 0hr 9 ng/L     NT-BNP PRO [[id number]]  (Normal) Collected: 04/20/22 1828    Lab Status: Final result Specimen: Blood from Arm, Left Updated: 04/20/22 1859     NT-proBNP 230 pg/mL     Basic metabolic panel [[id number]]  (Abnormal) Collected: 04/20/22 1828    Lab Status: Final result Specimen: Blood from Arm, Left Updated: 04/20/22 1859     Sodium 137 mmol/L      Potassium 4 2 mmol/L      Chloride 104 mmol/L      CO2 22 mmol/L      ANION GAP 11 mmol/L      BUN 25 mg/dL      Creatinine 1 10 mg/dL      Glucose 105 mg/dL      Calcium 10 2 mg/dL      eGFR 60 ml/min/1 73sq m     Narrative:      [place] guidelines for Chronic Kidney Disease (CKD):     Stage 1 with normal or high GFR (GFR > 90 mL/min/1 73 square meters)    Stage 2 Mild CKD (GFR = 60-89 mL/min/1 73 square meters)    Stage 3A Moderate CKD (GFR = 45-59 mL/min/1 73 square meters)    Stage 3B Moderate CKD (GFR = 30-44 mL/min/1 73 square meters)   Stage 4 Severe CKD (GFR = 15-29 mL/min/1 73 square meters)    Stage 5 End Stage CKD (GFR <15 mL/min/1 73 square meters)  Note: GFR calculation is accurate only with a steady state creatinine    Hepatic function panel [[id number]]  (Abnormal) Collected: 04/20/22 1828    Lab Status: Final result Specimen: Blood from Arm, Left Updated: 04/20/22 1859     Total Bilirubin 0 54 mg/dL      Bilirubin, Direct 0 16 mg/dL      Alkaline Phosphatase 120 U/L      AST 29 U/L      ALT 36 U/L      Total Protein 8 1 g/dL      Albumin 3 8 g/dL     Lactic acid [[id number]]  (Normal) Collected: 04/20/22 1828    Lab Status: Final result Specimen: Blood from Arm, Left Updated: 04/20/22 1855     LACTIC ACID 1 3 mmol/L     Narrative:      Result may be elevated if tourniquet was used during collection      Protime-INR [[id number]]  (Normal) Collected: 04/20/22 1828    Lab Status: Final result Specimen: Blood from Arm, Left Updated: 04/20/22 1847     Protime 13 4 seconds      INR 1 06    APTT [[id number]]  (Normal) Collected: 04/20/22 1828    Lab Status: Final result Specimen: Blood from Arm, Left Updated: 04/20/22 1847     PTT 26 seconds     CBC and differential [[id number]]  (Abnormal) Collected: 04/20/22 1828    Lab Status: Final result Specimen: Blood from Arm, Left Updated: 04/20/22 1835     WBC 10 15 Thousand/uL      RBC 4 11 Million/uL      Hemoglobin 13 3 g/dL      Hematocrit 39 4 %      MCV 96 fL      MCH 32 4 pg      MCHC 33 8 g/dL      RDW 14 5 %      MPV 9 3 fL      Platelets 127 Thousands/uL      nRBC 0 /100 WBCs      Neutrophils Relative 87 %      Immat GRANS % 0 %      Lymphocytes Relative 7 %      Monocytes Relative 6 %      Eosinophils Relative 0 %      Basophils Relative 0 %      Neutrophils Absolute 8 79 Thousands/µL      Immature Grans Absolute 0 03 Thousand/uL      Lymphocytes Absolute 0 66 Thousands/µL      Monocytes Absolute 0 60 Thousand/µL      Eosinophils Absolute 0 03 Thousand/µL      Basophils Absolute 0 04 Thousands/µL Incidental Findings:   · None     Test Results Pending at Discharge (will require follow up):   · BCx 2 will grow for 5 days total     Outpatient Tests Requested:  · CXR in 1 month to monitor for resolution    Complications:  none    Reason for Admission: [address] Course:   Ute Vazquez is a 80 y o  male patient with pmh CAD, bladder ca, tobacco use who originally presented to the hospital on 4/20/2022 due to AMS  Found to have acute metabolic encephalopathy from CAP  Initially thought to meet sepsis criteria however this was a very weak criteria  This patient's altered mental status improved greatly after only 1 dose of antibiotics  His urine strep antigen was positive and he will continue antibiotic treatment with Keflex for the full course  He did have positive blood culture result, however identification panel had no targets detected thus I suspect this is a contaminant  Of course we will follow for the total 5 days growth of both blood cultures and adjust antibiotic coverage as necessary  This patient has had a great clinical improvement in his labs are within normal limits  This patient is safe for discharge to home with follow-up with the primary care provider as well as a chest x-ray in 1 month to monitor for resolution  This patient and his family have had all the questions answered and they are amenable to his discharge home with home health care today  Please see above list of diagnoses and related plan for additional information       Condition at Discharge: stable    Discharge Day Visit / Exam:   Subjective:  No complaints  Vitals: Blood Pressure: 140/70 (04/22/22 0807)  Pulse: 64 (04/22/22 0807)  Temperature: 97 8 °F (36 6 °C) (04/22/22 0807)  Temp Source: Oral (04/21/22 0214)  Respirations: 18 (04/21/22 1538)  Height: 5' 10" (177 8 cm) (04/21/22 0214)  Weight - Scale: 60 4 kg (133 lb 2 5 oz) (04/21/22 0214)  SpO2: 94 % (04/22/22 0807)  Exam:   Physical Exam  Vitals and nursing note reviewed  Constitutional:       General: He is not in acute distress  Appearance: He is not ill-appearing, toxic-appearing or diaphoretic  HENT:      Head: Normocephalic and atraumatic  Nose: Nose normal       Mouth/Throat:      Mouth: Mucous membranes are moist    Eyes:      Extraocular Movements: Extraocular movements intact  Conjunctiva/sclera: Conjunctivae normal    Cardiovascular:      Rate and Rhythm: Normal rate and regular rhythm  Pulses: Normal pulses  Heart sounds: Normal heart sounds  Pulmonary:      Effort: Pulmonary effort is normal       Breath sounds: Normal breath sounds  Abdominal:      General: Bowel sounds are normal       Palpations: Abdomen is soft  Musculoskeletal:         General: Normal range of motion  Cervical back: Neck supple  Skin:     General: Skin is warm and dry  Neurological:      General: No focal deficit present  Mental Status: He is alert  Mental status is at baseline  Gait: Gait normal    Psychiatric:      Comments: Cognitive impairment          Discussion with Family: Updated  (son) via phone  Discharge instructions/Information to patient and family:   See after visit summary for information provided to patient and family  Provisions for Follow-Up Care:  See after visit summary for information related to follow-up care and any pertinent home health orders  Disposition:   Home    Planned Readmission: none     Discharge Statement:  I spent 45 minutes discharging the patient  This time was spent on the day of discharge  I had direct contact with the patient on the day of discharge  Greater than 50% of the total time was spent examining patient, answering all patient questions, arranging and discussing plan of care with patient as well as directly providing post-discharge instructions  Additional time then spent on discharge activities      Discharge Medications:  See after visit summary for reconciled discharge medications provided to patient and/or family        **Please Note: This note may have been constructed using a voice recognition system**

## 2022-04-22 NOTE — CASE MANAGEMENT
Case Management Discharge Planning Note    Patient name Sly Tate  Location [address] [phone]/-[id number] MRN [id number]  : 1935 Date 2022       Current Admission Date: 2022  Current Admission Diagnosis:CAP (community acquired pneumonia)   Patient Active Problem List    Diagnosis Date Noted    Positive blood culture 2022    CAP (community acquired pneumonia) 2022    Delirium 2020    Fall 2020    Bladder cancer ([facility] [address] ) 2020    CAD (coronary artery disease) 2020    H/O heart artery stent     Umbilical hernia     Odontoid fracture with type II morphology ([facility] [address] ) 2020    Multiple abrasions 2020    Nasal bones, closed fracture 2020    Atelectasis 2020    Pulmonary nodule 2020    Renal cyst 2020    Liver cyst 2020      LOS (days): 2  Geometric Mean LOS (GMLOS) (days): 4 80  Days to GMLOS:3 2     OBJECTIVE:  Risk of Unplanned Readmission Score: 12         Current admission status: Inpatient   Preferred Pharmacy:   [facility] - [place] [facility] [id number]  Phone: [phone] Fax: [phone]    Primary Care Provider: Aristides Page PA-C    Primary Insurance: MEDICARE  Secondary Insurance:     DISCHARGE DETAILS:    Discharge planning discussed with[de-identified] ahmet Dickey Flaca of Choice: Yes  Comments - Freedom of Choice: FOC: Susan accepted pt [facility] Saturday   Son accepts Susan  CM contacted family/caregiver?: Yes  Were Treatment Team discharge recommendations reviewed with patient/caregiver?: Yes  Did patient/caregiver verbalize understanding of patient care needs?: Yes  Were patient/caregiver advised of the risks associated with not following Treatment Team discharge recommendations?: Yes    Contacts  Patient Contacts: Rin leo  Contact Method: Phone  Phone Number: [phone]  Reason/Outcome: Continuity of Care    Requested [address]         Is the patient interested in [facility] at discharge?: Yes  [address] requested[de-identified] [address] Name[de-identified] [address] Provider[de-identified] PCP  Home Health Services Needed[de-identified] Evaluate Functional Status and Safety,Gait/ADL Training,Strengthening/Theraputic Exercises to Improve Function  Homebound Criteria Met[de-identified] Uses an Assist Device (i e  cane, walker, etc)  Supporting Clincal Findings[de-identified] Limited Endurance    DME Referral Provided  Referral made for DME?: No    Other Referral/Resources/Interventions Provided:  Interventions: [facility]  Referral Comments: declines STR, Susan at home accepts patient         Treatment Team Recommendation: Short Term Rehab  Discharge Destination Plan[de-identified] Home with [address]                                IMM Given (Date):: 04/22/22  IMM Given to[de-identified] Patient  Family notified[de-identified] reviewed rights with son via phone   verbalized that he feels patient is ready for discharge

## 2022-04-23 LAB
ALL TARGETS: NOT DETECTED
BACTERIA BLD CULT: ABNORMAL
GRAM STN SPEC: ABNORMAL

## 2022-04-25 LAB — BACTERIA BLD CULT: NORMAL

## 2022-10-11 PROBLEM — J18.9 CAP (COMMUNITY ACQUIRED PNEUMONIA): Status: RESOLVED | Noted: 2022-04-20 | Resolved: 2022-10-11

## 2023-05-13 ENCOUNTER — APPOINTMENT (EMERGENCY)
Dept: CT IMAGING | Facility: HOSPITAL | Age: 88
End: 2023-05-13

## 2023-05-13 ENCOUNTER — APPOINTMENT (EMERGENCY)
Dept: RADIOLOGY | Facility: HOSPITAL | Age: 88
End: 2023-05-13

## 2023-05-13 ENCOUNTER — HOSPITAL ENCOUNTER (INPATIENT)
Facility: HOSPITAL | Age: 88
LOS: 12 days | Discharge: NURSING, REHAB OR LONG-TERM CARE | End: 2023-05-25
Attending: EMERGENCY MEDICINE | Admitting: INTERNAL MEDICINE

## 2023-05-13 DIAGNOSIS — S32.402A LEFT ACETABULAR FRACTURE (HCC): Primary | ICD-10-CM

## 2023-05-13 DIAGNOSIS — I97.89 TYPE II ENDOLEAK OF AORTIC GRAFT: ICD-10-CM

## 2023-05-13 DIAGNOSIS — R45.851 SUICIDAL IDEATION: ICD-10-CM

## 2023-05-13 DIAGNOSIS — R93.5 ABNORMAL CT OF THE ABDOMEN: ICD-10-CM

## 2023-05-13 DIAGNOSIS — S32.402D CLOSED DISPLACED FRACTURE OF LEFT ACETABULUM WITH ROUTINE HEALING, UNSPECIFIED PORTION OF ACETABULUM, SUBSEQUENT ENCOUNTER: ICD-10-CM

## 2023-05-13 DIAGNOSIS — Z86.59 HISTORY OF DEMENTIA: ICD-10-CM

## 2023-05-13 DIAGNOSIS — K59.00 CONSTIPATION: ICD-10-CM

## 2023-05-13 PROBLEM — I71.9 AORTIC ANEURYSM (HCC): Status: ACTIVE | Noted: 2023-05-13

## 2023-05-13 LAB
ANION GAP SERPL CALCULATED.3IONS-SCNC: 6 MMOL/L (ref 4–13)
ATRIAL RATE: 73 BPM
BACTERIA UR QL AUTO: ABNORMAL /HPF
BASOPHILS # BLD AUTO: 0.02 THOUSANDS/ÂΜL (ref 0–0.1)
BASOPHILS NFR BLD AUTO: 0 % (ref 0–1)
BILIRUB UR QL STRIP: NEGATIVE
BUN SERPL-MCNC: 40 MG/DL (ref 5–25)
CALCIUM SERPL-MCNC: 11.1 MG/DL (ref 8.4–10.2)
CHLORIDE SERPL-SCNC: 108 MMOL/L (ref 96–108)
CLARITY UR: CLEAR
CO2 SERPL-SCNC: 23 MMOL/L (ref 21–32)
COLOR UR: YELLOW
CREAT SERPL-MCNC: 1.24 MG/DL (ref 0.6–1.3)
EOSINOPHIL # BLD AUTO: 0.01 THOUSAND/ÂΜL (ref 0–0.61)
EOSINOPHIL NFR BLD AUTO: 0 % (ref 0–6)
ERYTHROCYTE [DISTWIDTH] IN BLOOD BY AUTOMATED COUNT: 14.2 % (ref 11.6–15.1)
GFR SERPL CREATININE-BSD FRML MDRD: 51 ML/MIN/1.73SQ M
GLUCOSE SERPL-MCNC: 104 MG/DL (ref 65–140)
GLUCOSE UR STRIP-MCNC: NEGATIVE MG/DL
HCT VFR BLD AUTO: 38.8 % (ref 36.5–49.3)
HGB BLD-MCNC: 13 G/DL (ref 12–17)
HGB UR QL STRIP.AUTO: ABNORMAL
IMM GRANULOCYTES # BLD AUTO: 0.04 THOUSAND/UL (ref 0–0.2)
IMM GRANULOCYTES NFR BLD AUTO: 0 % (ref 0–2)
KETONES UR STRIP-MCNC: ABNORMAL MG/DL
LEUKOCYTE ESTERASE UR QL STRIP: NEGATIVE
LYMPHOCYTES # BLD AUTO: 0.76 THOUSANDS/ÂΜL (ref 0.6–4.47)
LYMPHOCYTES NFR BLD AUTO: 8 % (ref 14–44)
MCH RBC QN AUTO: 32.3 PG (ref 26.8–34.3)
MCHC RBC AUTO-ENTMCNC: 33.5 G/DL (ref 31.4–37.4)
MCV RBC AUTO: 97 FL (ref 82–98)
MONOCYTES # BLD AUTO: 0.69 THOUSAND/ÂΜL (ref 0.17–1.22)
MONOCYTES NFR BLD AUTO: 7 % (ref 4–12)
MUCOUS THREADS UR QL AUTO: ABNORMAL
NEUTROPHILS # BLD AUTO: 7.95 THOUSANDS/ÂΜL (ref 1.85–7.62)
NEUTS SEG NFR BLD AUTO: 85 % (ref 43–75)
NITRITE UR QL STRIP: NEGATIVE
NON-SQ EPI CELLS URNS QL MICRO: ABNORMAL /HPF
NRBC BLD AUTO-RTO: 0 /100 WBCS
P AXIS: 45 DEGREES
PH UR STRIP.AUTO: 5.5 [PH]
PLATELET # BLD AUTO: 180 THOUSANDS/UL (ref 149–390)
PMV BLD AUTO: 9.9 FL (ref 8.9–12.7)
POTASSIUM SERPL-SCNC: 4.4 MMOL/L (ref 3.5–5.3)
PR INTERVAL: 140 MS
PROT UR STRIP-MCNC: ABNORMAL MG/DL
QRS AXIS: 57 DEGREES
QRSD INTERVAL: 104 MS
QT INTERVAL: 406 MS
QTC INTERVAL: 447 MS
RBC # BLD AUTO: 4.02 MILLION/UL (ref 3.88–5.62)
RBC #/AREA URNS AUTO: ABNORMAL /HPF
SODIUM SERPL-SCNC: 137 MMOL/L (ref 135–147)
SP GR UR STRIP.AUTO: 1.04 (ref 1–1.03)
T WAVE AXIS: -40 DEGREES
UROBILINOGEN UR STRIP-ACNC: <2 MG/DL
VENTRICULAR RATE: 73 BPM
WBC # BLD AUTO: 9.47 THOUSAND/UL (ref 4.31–10.16)
WBC #/AREA URNS AUTO: ABNORMAL /HPF

## 2023-05-13 RX ORDER — SODIUM CHLORIDE 9 MG/ML
50 INJECTION, SOLUTION INTRAVENOUS CONTINUOUS
Status: DISCONTINUED | OUTPATIENT
Start: 2023-05-13 | End: 2023-05-14

## 2023-05-13 RX ORDER — POLYETHYLENE GLYCOL 3350 17 G/17G
17 POWDER, FOR SOLUTION ORAL DAILY PRN
Status: DISCONTINUED | OUTPATIENT
Start: 2023-05-13 | End: 2023-05-25 | Stop reason: HOSPADM

## 2023-05-13 RX ORDER — HEPARIN SODIUM 5000 [USP'U]/ML
5000 INJECTION, SOLUTION INTRAVENOUS; SUBCUTANEOUS EVERY 8 HOURS SCHEDULED
Status: DISCONTINUED | OUTPATIENT
Start: 2023-05-13 | End: 2023-05-13

## 2023-05-13 RX ORDER — ACETAMINOPHEN 325 MG/1
650 TABLET ORAL EVERY 8 HOURS PRN
Status: DISCONTINUED | OUTPATIENT
Start: 2023-05-13 | End: 2023-05-13

## 2023-05-13 RX ORDER — LIDOCAINE 50 MG/G
1 PATCH TOPICAL DAILY PRN
Status: DISCONTINUED | OUTPATIENT
Start: 2023-05-13 | End: 2023-05-25 | Stop reason: HOSPADM

## 2023-05-13 RX ORDER — NIACIN 500 MG/1
500 TABLET, EXTENDED RELEASE ORAL
Status: DISCONTINUED | OUTPATIENT
Start: 2023-05-14 | End: 2023-05-25 | Stop reason: HOSPADM

## 2023-05-13 RX ORDER — ASPIRIN 81 MG/1
81 TABLET, CHEWABLE ORAL DAILY
Status: DISCONTINUED | OUTPATIENT
Start: 2023-05-14 | End: 2023-05-25 | Stop reason: HOSPADM

## 2023-05-13 RX ORDER — NICOTINE 21 MG/24HR
1 PATCH, TRANSDERMAL 24 HOURS TRANSDERMAL DAILY
Status: DISCONTINUED | OUTPATIENT
Start: 2023-05-14 | End: 2023-05-25 | Stop reason: HOSPADM

## 2023-05-13 RX ORDER — ACETAMINOPHEN 325 MG/1
975 TABLET ORAL EVERY 8 HOURS SCHEDULED
Status: DISCONTINUED | OUTPATIENT
Start: 2023-05-13 | End: 2023-05-25 | Stop reason: HOSPADM

## 2023-05-13 RX ADMIN — IOHEXOL 100 ML: 350 INJECTION, SOLUTION INTRAVENOUS at 17:13

## 2023-05-13 RX ADMIN — Medication 2.5 MG: at 21:34

## 2023-05-13 RX ADMIN — SODIUM CHLORIDE, SODIUM LACTATE, POTASSIUM CHLORIDE, AND CALCIUM CHLORIDE 1000 ML: .6; .31; .03; .02 INJECTION, SOLUTION INTRAVENOUS at 18:13

## 2023-05-13 RX ADMIN — SODIUM CHLORIDE 50 ML/HR: 0.9 INJECTION, SOLUTION INTRAVENOUS at 22:23

## 2023-05-13 RX ADMIN — ACETAMINOPHEN 975 MG: 325 TABLET, FILM COATED ORAL at 21:33

## 2023-05-13 NOTE — ASSESSMENT & PLAN NOTE
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

## 2023-05-13 NOTE — ED PROVIDER NOTES
History  Chief Complaint   Patient presents with   • Pain     Pt brought in via EMS from home  Hx of dementia  Reporting L hip pain/groin pain, back pain and abdominal pain  Per EMS pt family reporting decreased urinary output and constipation  HPI    Prior to Admission Medications   Prescriptions Last Dose Informant Patient Reported? Taking?   acetaminophen (TYLENOL) 500 mg tablet   No No   Sig: Take 1 tablet (500 mg total) by mouth every 4 (four) hours as needed for moderate pain or headaches   aspirin 81 mg chewable tablet   Yes No   Sig: Chew 81 mg daily   guaiFENesin (ROBITUSSIN) 100 MG/5ML oral liquid   No No   Sig: Take 10 mL (200 mg total) by mouth every 4 (four) hours as needed for cough or congestion   niacin (NIASPAN) 500 mg CR tablet   Yes No   Sig: Take 500 mg by mouth   nicotine (NICODERM CQ) 14 mg/24hr TD 24 hr patch   No No   Sig: Place 1 patch on the skin daily      Facility-Administered Medications: None       Past Medical History:   Diagnosis Date   • AMS (altered mental status) 4/20/2022   • Bladder cancer ([facility] [address] )    • CAD (coronary artery disease)    • Dyslipidemia    • History of heart artery stent    • Umbilical hernia        Past Surgical History:   Procedure Laterality Date   • BLADDER SURGERY     • CORONARY ANGIOPLASTY WITH STENT PLACEMENT     • TRANSODONTOID FUSION N/A 5/4/2020    Procedure: Transodontoid Fusion;  Surgeon: Marga Garcia MD;  Location: [facility];  Service: Neurosurgery       Family History   Family history unknown: Yes     I have reviewed and agree with the history as documented      E-Cigarette/Vaping   • E-Cigarette Use Never User      E-Cigarette/Vaping Substances   • Nicotine No    • THC No    • CBD No    • Flavoring No    • Other No    • Unknown No      Social History     Tobacco Use   • Smoking status: Every Day     Packs/day: 0 50     Types: Cigarettes   • Smokeless tobacco: Never   Vaping Use   • Vaping Use: Never used   Substance Use Topics   • Alcohol use: Yes   • Drug use: Never       Review of Systems    Physical Exam  Physical Exam  Vitals and nursing note reviewed  Constitutional:       General: He is not in acute distress  Appearance: He is well-developed  HENT:      Head: Normocephalic and atraumatic  Eyes:      Conjunctiva/sclera: Conjunctivae normal       Pupils: Pupils are equal, round, and reactive to light  Neck:      Trachea: No tracheal deviation  Cardiovascular:      Rate and Rhythm: Normal rate and regular rhythm  Pulses:           Dorsalis pedis pulses are 2+ on the left side  Heart sounds: Normal heart sounds  Pulmonary:      Effort: Pulmonary effort is normal  No respiratory distress  Breath sounds: Normal breath sounds  Chest:      Chest wall: No lacerations, deformity, tenderness, crepitus or edema  Abdominal:      General: There is no distension  Palpations: Abdomen is soft  Tenderness: There is no abdominal tenderness  Musculoskeletal:         General: No deformity  Cervical back: Full passive range of motion without pain, normal range of motion and neck supple  No spinous process tenderness or muscular tenderness  Thoracic back: No tenderness or bony tenderness  Lumbar back: No tenderness or bony tenderness  Right hip: No tenderness  Left hip: Tenderness (anterior hip) present  No deformity or crepitus  Decreased range of motion (pain with any passive ROM)  Right upper leg: No tenderness  Left upper leg: No tenderness  Right knee: No tenderness  Left knee: No tenderness  Right lower leg: No tenderness  Left lower leg: No tenderness  Right ankle: No tenderness  Left ankle: No tenderness  Right foot: No tenderness  Left foot: No tenderness  Skin:     General: Skin is warm and dry  Findings: No abrasion, bruising, ecchymosis or laceration  Neurological:      Mental Status: He is alert  Mental status is at baseline   He is disoriented  GCS: GCS eye subscore is 4  GCS verbal subscore is 4  GCS motor subscore is 6  Comments: Oriented to himself and that he is in the hospital, does not know month or year   Does not know why he is in the hospital   Psychiatric:         Behavior: Behavior normal          Vital Signs  ED Triage Vitals [05/13/23 1556]   Temperature Pulse Respirations Blood Pressure SpO2   98 6 °F (37 °C) 76 18 153/68 94 %      Temp Source Heart Rate Source Patient Position - Orthostatic VS BP Location FiO2 (%)   Oral -- Sitting Left arm --      Pain Score       --           Vitals:    05/13/23 1556 05/13/23 1800   BP: 153/68 168/75   Pulse: 76 73   Patient Position - Orthostatic VS: Sitting          Visual Acuity  Visual Acuity    Flowsheet Row Most Recent Value   L Pupil Size (mm) 3   R Pupil Size (mm) 3   L Pupil Shape Round   R Pupil Shape Round          ED Medications  Medications   iohexol (OMNIPAQUE) 350 MG/ML injection (SINGLE-DOSE) 100 mL (100 mL Intravenous Given 5/13/23 1713)   lactated ringers bolus 1,000 mL (1,000 mL Intravenous New Bag 5/13/23 1813)       Diagnostic Studies  Results Reviewed     Procedure Component Value Units Date/Time    Urine Microscopic [[id number]]  (Abnormal) Collected: 05/13/23 1833    Lab Status: Final result Specimen: Urine Updated: 05/13/23 1834     RBC, UA 1-2 /hpf      WBC, UA 4-10 /hpf      Epithelial Cells None Seen /hpf      Bacteria, UA None Seen /hpf      MUCUS THREADS Occasional    UA w Reflex to Microscopic w Reflex to Culture [[id number]]  (Abnormal) Collected: 05/13/23 1833    Lab Status: Final result Specimen: Urine Updated: 05/13/23 1833     Color, UA Yellow     Clarity, UA Clear     Specific Gravity, UA 1 041     pH, UA 5 5     Leukocytes, UA Negative     Nitrite, UA Negative     Protein, UA 70 (1+) mg/dl      Glucose, UA Negative mg/dl      Ketones, UA Trace mg/dl      Urobilinogen, UA <2 0 mg/dl      Bilirubin, UA Negative     Occult Blood, UA Trace    Basic metabolic panel [[id number]]  (Abnormal) Collected: 05/13/23 1641    Lab Status: Final result Specimen: Blood from Arm, Right Updated: 05/13/23 1702     Sodium 137 mmol/L      Potassium 4 4 mmol/L      Chloride 108 mmol/L      CO2 23 mmol/L      ANION GAP 6 mmol/L      BUN 40 mg/dL      Creatinine 1 24 mg/dL      Glucose 104 mg/dL      Calcium 11 1 mg/dL      eGFR 51 ml/min/1 73sq m     Narrative:      [place] guidelines for Chronic Kidney Disease (CKD):   •  Stage 1 with normal or high GFR (GFR > 90 mL/min/1 73 square meters)  •  Stage 2 Mild CKD (GFR = 60-89 mL/min/1 73 square meters)  •  Stage 3A Moderate CKD (GFR = 45-59 mL/min/1 73 square meters)  •  Stage 3B Moderate CKD (GFR = 30-44 mL/min/1 73 square meters)  •  Stage 4 Severe CKD (GFR = 15-29 mL/min/1 73 square meters)  •  Stage 5 End Stage CKD (GFR <15 mL/min/1 73 square meters)  Note: GFR calculation is accurate only with a steady state creatinine    CBC and differential [[id number]]  (Abnormal) Collected: 05/13/23 1641    Lab Status: Final result Specimen: Blood from Arm, Right Updated: 05/13/23 1647     WBC 9 47 Thousand/uL      RBC 4 02 Million/uL      Hemoglobin 13 0 g/dL      Hematocrit 38 8 %      MCV 97 fL      MCH 32 3 pg      MCHC 33 5 g/dL      RDW 14 2 %      MPV 9 9 fL      Platelets 284 Thousands/uL      nRBC 0 /100 WBCs      Neutrophils Relative 85 %      Immat GRANS % 0 %      Lymphocytes Relative 8 %      Monocytes Relative 7 %      Eosinophils Relative 0 %      Basophils Relative 0 %      Neutrophils Absolute 7 95 Thousands/µL      Immature Grans Absolute 0 04 Thousand/uL      Lymphocytes Absolute 0 76 Thousands/µL      Monocytes Absolute 0 69 Thousand/µL      Eosinophils Absolute 0 01 Thousand/µL      Basophils Absolute 0 02 Thousands/µL                  CT abdomen pelvis with contrast   Final Result by Newton Pina MD (05/13 1858)   Addendum (preliminary) 1 of 1 by Newton Pina MD (05/13 1858) "  ADDENDUM: There is a typographical error in the body and impression of the    report  It should state there is a \"Acute , mildly displaced, comminuted    fracture of the LEFT acetabulum involving the anterior and posterior    columns  \"                  Final      Acute, mildly displaced, comminuted fracture of the right acetabulum involving the anterior and posterior columns  No other acute fractures  No hip dislocation  Perivesical fat stranding, suggestive of cystitis  Recommend correlation with urinalysis  Infrarenal abdominal aortic aneurysm status post EVAR with patent graft, however increased size of maximum aneurysm sac diameter now measuring 4 9 compared to 4 6 cm in April 2020 with findings suggestive of an endoleak  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 also mildly enlarged from 2 0 cm in April 2020  Patchy opacities at the lung bases, which may represent atelectasis versus pneumonia  Additional findings as above  The study was marked in [facility] for immediate notification              Workstation performed: [id number]         XR hip/pelv 2-3 vws left    (Results Pending)              Procedures  ECG 12 Lead Documentation Only    Date/Time: 5/13/2023 4:04 PM  Performed by: Johan Ferguson MD  Authorized by: Johan Ferguson MD     Indications / Diagnosis:  Unable to bear weight  ECG reviewed by me, the ED Provider: yes    Patient location:  ED  Previous ECG:     Previous ECG:  Compared to current    Comparison ECG info:  04/20/22    Similarity:  No change  Interpretation:     Interpretation: abnormal    Quality:     Tracing quality:  Limited by artifact  Rate:     ECG rate:  73    ECG rate assessment: normal    Rhythm:     Rhythm: sinus rhythm    Ectopy:     Ectopy: none    QRS:     QRS axis:  Normal    QRS intervals:  Normal  Conduction:     Conduction: normal    ST segments:     ST segments:  Normal  T waves:     T " waves: flattening and inverted      Flattening:  V6, II, III and aVF    Inverted:  V3, V4 and V5  Other findings:     Other findings: LVH               ED Course                                             Medical Decision Making  This is an 31-year-old male who presents here today for an uncertain reason  The patient says his son called EMS because his hearing has been going bad, but does not really know why he is in the ER because he has no complaints  EMS told nursing that patient was complaining of left groin, back, and abdominal pain  When I asked the patient about complaints of pain, he says his right thigh has been hurting  The patient says he lives with his son and daughter, who are not currently at the bedside  Review of systems: Otherwise negative as per patient though possibly limited due to his dementia  He is frail appearing, but in no acute distress  He has tenderness to the left groin, mildly to the left lower quadrant, and significant pain with any passive movement of the left hip  There is no tenderness to the right leg or hip, or pain with movement of the hip  He requires assistance to sit up in bed, and complains of pain in the left hip with doing so  He has no tenderness to his back  There are no external signs of trauma  Exam is otherwise unremarkable  We will start by obtaining an x-ray of the hip for possible fracture, and try and contact family for further information  I was able to speak with the patient's son, Luz Rinne  He says the patient usually walks around with a walker though sometimes does not because he does not feel like he needs it  He says he seemed slightly slower than normal walking to the bath last night, had no complaints while in the bath, however afterwards was unable to stand to get out of the bath  He has been unable to bear weight since then    He was initially complaining of abdominal and groin pain and has a remote history of a hernia that was repaired  Today he was not complaining of specific areas of pain, just having pain when trying to stand and unable to bear weight  He is usually continent of urine though due to inability to walk to the bathroom, they put him in a diaper  They feel like he has been urinating less today than he normally does  He denies any recent falls of the patient  He says the patient will occasionally slide off the couch when trying to reach for something, but this last happened 2 weeks ago and he was not complaining of any pain at that time  He has no recent fevers or URI symptoms  He takes no daily medications  He has severe dementia and current mental status as described by myself would be his baseline  X-ray of the hip was reviewed by myself and shows arthritic changes but no obvious fracture  Given initial report of abdominal pain, we will get a CT scan of the abdomen and pelvis to evaluate for intra-abdominal abnormalities that may be contributing to pain with walking, including diverticulitis or colitis, and will also further show bony injuries such as fractures of the hip, pelvis, or lumbar spine  He does not have any recent falls or complaints of pain anywhere else, or findings of trauma on exam to suggest need for additional imaging at this time  Creatinine is minimally up from baseline of around 1 0, however BUN is more elevated suggestive of possible prerenal, for which we will give some IV fluids  He has no associated electrolyte abnormalities  He has had protein and trace ketones in urine with no signs of infection  CT scan shows left acetabular fracture but no other traumatic injuries  He does also have a likely endoleak of his previously repaired AAA  I discussed the patient with Dr Joseph Muñoz from vascular surgery, who says this looks like a type II endoleak, and would require outpatient follow-up for evaluation, with endovascular embolization as an eventual management option    I spoke with   Geovanny from orthopedics, who says the patient can stay here for management  I did call the son again to update him on findings, including outpatient vascular surgery follow-up, and he expresses understanding  History of dementia: chronic illness or injury  Left acetabular fracture [facility]): acute illness or injury  Type II endoleak of aortic graft: acute illness or injury  Amount and/or Complexity of Data Reviewed  Independent Historian: caregiver and EMS     Details: son  Labs: ordered  Decision-making details documented in ED Course  Radiology: ordered and independent interpretation performed  Decision-making details documented in ED Course  ECG/medicine tests: ordered and independent interpretation performed  Decision-making details documented in ED Course  Risk  Prescription drug management  Decision regarding hospitalization  Disposition  Final diagnoses:   Left acetabular fracture ([address] )   History of dementia   Type II endoleak of aortic graft     Time reflects when diagnosis was documented in both MDM as applicable and the Disposition within this note     Time User Action Codes Description Comment    5/13/2023  7:17 PM John Iglesias Add [S32 402A] Left acetabular fracture ([address] )     5/13/2023  7:17 PM John Iglesias Add [Z86 59] History of dementia     5/13/2023  7:18 PM John Iglesias Add [I97 89] Type II endoleak of aortic graft       ED Disposition     ED Disposition   Admit    Condition   Stable    Date/Time   Sat May 13, 2023  8:01 PM    Comment   Case was discussed with [facility] and the patient's admission status was agreed to be Admission Status: inpatient status to the service of Dr Isabella Bustamante  Follow-up Information    None         Patient's Medications   Discharge Prescriptions    No medications on file       No discharge procedures on file      PDMP Review     None          ED Provider  Electronically Signed by Tomas Florentino MD  05/13/23 2002       Tomas Florentino MD  05/13/23 2004       Tomas Florentino MD  05/14/23 3698

## 2023-05-13 NOTE — ASSESSMENT & PLAN NOTE
Presents with L hip pain   · CT LE: Acute , mildly displaced, comminuted fracture of the LEFT acetabulum involving the anterior and posterior columns     · Ortho consult  · Given hx of dementia will keep on strict bed rest overnight, NWB LLE    · PT/OT

## 2023-05-14 ENCOUNTER — APPOINTMENT (INPATIENT)
Dept: RADIOLOGY | Facility: HOSPITAL | Age: 88
End: 2023-05-14

## 2023-05-14 ENCOUNTER — APPOINTMENT (INPATIENT)
Dept: NON INVASIVE DIAGNOSTICS | Facility: HOSPITAL | Age: 88
End: 2023-05-14

## 2023-05-14 PROBLEM — Z01.818 PRE-OPERATIVE CLEARANCE: Status: ACTIVE | Noted: 2023-05-14

## 2023-05-14 LAB
ALBUMIN SERPL BCP-MCNC: 3.4 G/DL (ref 3.5–5)
ALP SERPL-CCNC: 90 U/L (ref 34–104)
ALT SERPL W P-5'-P-CCNC: 13 U/L (ref 7–52)
ANION GAP SERPL CALCULATED.3IONS-SCNC: 4 MMOL/L (ref 4–13)
AORTIC ROOT: 3.6 CM
APICAL FOUR CHAMBER EJECTION FRACTION: 64 %
AST SERPL W P-5'-P-CCNC: 17 U/L (ref 13–39)
AV LVOT MEAN GRADIENT: 2 MMHG
AV LVOT PEAK GRADIENT: 5 MMHG
AV PEAK GRADIENT: 12 MMHG
AV REGURGITATION PRESSURE HALF TIME: 521 MS
BILIRUB SERPL-MCNC: 0.94 MG/DL (ref 0.2–1)
BUN SERPL-MCNC: 29 MG/DL (ref 5–25)
CALCIUM ALBUM COR SERPL-MCNC: 10.4 MG/DL (ref 8.3–10.1)
CALCIUM SERPL-MCNC: 9.9 MG/DL (ref 8.4–10.2)
CHLORIDE SERPL-SCNC: 110 MMOL/L (ref 96–108)
CO2 SERPL-SCNC: 23 MMOL/L (ref 21–32)
CREAT SERPL-MCNC: 1.01 MG/DL (ref 0.6–1.3)
DOP CALC LVOT PEAK VEL VTI: 26.22 CM
DOP CALC LVOT PEAK VEL: 1.08 M/S
E WAVE DECELERATION TIME: 226 MS
ERYTHROCYTE [DISTWIDTH] IN BLOOD BY AUTOMATED COUNT: 14.4 % (ref 11.6–15.1)
FRACTIONAL SHORTENING: 37 % (ref 28–44)
GFR SERPL CREATININE-BSD FRML MDRD: 66 ML/MIN/1.73SQ M
GLUCOSE SERPL-MCNC: 89 MG/DL (ref 65–140)
HCT VFR BLD AUTO: 33.2 % (ref 36.5–49.3)
HGB BLD-MCNC: 11.2 G/DL (ref 12–17)
INTERVENTRICULAR SEPTUM IN DIASTOLE (PARASTERNAL SHORT AXIS VIEW): 1.4 CM
INTERVENTRICULAR SEPTUM: 1.4 CM (ref 0.6–1.1)
LAAS-AP2: 20.4 CM2
LAAS-AP4: 21.4 CM2
LEFT ATRIUM SIZE: 3.8 CM
LEFT INTERNAL DIMENSION IN SYSTOLE: 2.6 CM (ref 2.1–4)
LEFT VENTRICULAR INTERNAL DIMENSION IN DIASTOLE: 4.1 CM (ref 3.5–6)
LEFT VENTRICULAR POSTERIOR WALL IN END DIASTOLE: 1.3 CM
LEFT VENTRICULAR STROKE VOLUME: 49 ML
LVSV (TEICH): 49 ML
MCH RBC QN AUTO: 32.2 PG (ref 26.8–34.3)
MCHC RBC AUTO-ENTMCNC: 33.7 G/DL (ref 31.4–37.4)
MCV RBC AUTO: 95 FL (ref 82–98)
MV E'TISSUE VEL-SEP: 7 CM/S
MV PEAK A VEL: 0.58 M/S
MV PEAK E VEL: 50 CM/S
MV STENOSIS PRESSURE HALF TIME: 66 MS
MV VALVE AREA P 1/2 METHOD: 3.33 CM2
PLATELET # BLD AUTO: 141 THOUSANDS/UL (ref 149–390)
PMV BLD AUTO: 9.8 FL (ref 8.9–12.7)
POTASSIUM SERPL-SCNC: 3.8 MMOL/L (ref 3.5–5.3)
PROT SERPL-MCNC: 6.7 G/DL (ref 6.4–8.4)
RBC # BLD AUTO: 3.48 MILLION/UL (ref 3.88–5.62)
RIGHT ATRIUM AREA SYSTOLE A4C: 13 CM2
RIGHT VENTRICLE ID DIMENSION: 2.3 CM
SL CV AV DECELERATION TIME RETROGRADE: 1797 MS
SL CV AV PEAK GRADIENT RETROGRADE: 59 MMHG
SL CV LEFT ATRIUM LENGTH A2C: 6 CM
SL CV LV EF: 55
SL CV PED ECHO LEFT VENTRICLE DIASTOLIC VOLUME (MOD BIPLANE) 2D: 74 ML
SL CV PED ECHO LEFT VENTRICLE SYSTOLIC VOLUME (MOD BIPLANE) 2D: 25 ML
SODIUM SERPL-SCNC: 137 MMOL/L (ref 135–147)
TR MAX PG: 33 MMHG
TR PEAK VELOCITY: 2.9 M/S
TRICUSPID ANNULAR PLANE SYSTOLIC EXCURSION: 2.3 CM
TRICUSPID VALVE PEAK REGURGITATION VELOCITY: 2.89 M/S
WBC # BLD AUTO: 7.95 THOUSAND/UL (ref 4.31–10.16)

## 2023-05-14 RX ORDER — LANOLIN ALCOHOL/MO/W.PET/CERES
6 CREAM (GRAM) TOPICAL ONCE
Status: COMPLETED | OUTPATIENT
Start: 2023-05-14 | End: 2023-05-14

## 2023-05-14 RX ADMIN — ACETAMINOPHEN 975 MG: 325 TABLET, FILM COATED ORAL at 21:14

## 2023-05-14 RX ADMIN — ACETAMINOPHEN 975 MG: 325 TABLET, FILM COATED ORAL at 14:15

## 2023-05-14 RX ADMIN — NICOTINE 1 PATCH: 14 PATCH, EXTENDED RELEASE TRANSDERMAL at 09:45

## 2023-05-14 RX ADMIN — ACETAMINOPHEN 975 MG: 325 TABLET, FILM COATED ORAL at 05:05

## 2023-05-14 RX ADMIN — ASPIRIN 81 MG: 81 TABLET, CHEWABLE ORAL at 09:45

## 2023-05-14 RX ADMIN — Medication 6 MG: at 22:23

## 2023-05-14 RX ADMIN — Medication 2.5 MG: at 09:45

## 2023-05-14 RX ADMIN — Medication 2.5 MG: at 04:06

## 2023-05-14 RX ADMIN — Medication 500 MG: at 16:16

## 2023-05-14 NOTE — ASSESSMENT & PLAN NOTE
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

## 2023-05-14 NOTE — ASSESSMENT & PLAN NOTE
· Patient denies shortness of breath or chest pain prior to fall  · Echocardiogram-EF 55% and wall motion normal; aortic valve mild to moderate regurgitation  · EKG-normal sinus rhythm  · Chest x-ray pending  · If normal chest x-ray patient would be moderate risk for low risk procedure

## 2023-05-14 NOTE — ASSESSMENT & PLAN NOTE
· Unknown as to whether patient has had cardiac stents in the past as patient is unsure and son is unsure

## 2023-05-14 NOTE — PLAN OF CARE
Problem: Potential for Falls  Goal: Patient will remain free of falls  Description: INTERVENTIONS:  - Educate patient/family on patient safety including physical limitations  - Instruct patient to call for assistance with activity   - Consult OT/PT to assist with strengthening/mobility   - Keep Call bell within reach  - Keep bed low and locked with side rails adjusted as appropriate  - Keep care items and personal belongings within reach  - Initiate and maintain comfort rounds  - Make Fall Risk Sign visible to staff  - Offer Toileting every Hours, in advance of need  - Initiate/Maintain alarm  - Obtain necessary fall risk management equipment:   - Apply yellow socks and bracelet for high fall risk patients  - Consider moving patient to room near nurses station  Outcome: Progressing     Problem: MOBILITY - ADULT  Goal: Maintain or return to baseline ADL function  Description: INTERVENTIONS:  -  Assess patient's ability to carry out ADLs; assess patient's baseline for ADL function and identify physical deficits which impact ability to perform ADLs (bathing, care of mouth/teeth, toileting, grooming, dressing, etc )  - Assess/evaluate cause of self-care deficits   - Assess range of motion  - Assess patient's mobility; develop plan if impaired  - Assess patient's need for assistive devices and provide as appropriate  - Encourage maximum independence but intervene and supervise when necessary  - Involve family in performance of ADLs  - Assess for home care needs following discharge   - Consider OT consult to assist with ADL evaluation and planning for discharge  - Provide patient education as appropriate  Outcome: Progressing  Goal: Maintains/Returns to pre admission functional level  Description: INTERVENTIONS:  - Perform BMAT or MOVE assessment daily    - Set and communicate daily mobility goal to care team and patient/family/caregiver     - Collaborate with rehabilitation services on mobility goals if consulted  - Perform Range of Motion  times a day  - Reposition patient every  hours    - Dangle patient  times a day  - Stand patient  times a day  - Ambulate patient  times a day  - Out of bed to chair  times a day   - Out of bed for meals tmes a day  - Out of bed for toileting  - Record patient progress and toleration of activity level   Outcome: Progressing

## 2023-05-14 NOTE — H&P
[address] [place] [address]  H&P  Name: Fredy Maldonado 80 y o  male I MRN: [id number]  Unit/Bed#: -01 I Date of Admission: 5/13/2023   Date of Service: 5/13/2023 I Hospital Day: 0      Assessment/Plan   * Closed displaced fracture of left acetabulum [facility])  Assessment & Plan  Presents with L hip pain   · CT LE: Acute , mildly displaced, comminuted fracture of the LEFT acetabulum involving the anterior and posterior columns  · Ortho consult  · Given hx of dementia will keep on strict bed rest overnight, NWB LLE    · PT/OT     Abnormal CT of the abdomen  Assessment & Plan  · CT revealed perivesical fat stranding, suggestive of cystitis and patchy opacities at the lung bases, which may represent atelectasis versus pneumonia  · No leukocytosis or fevers   · UA negative   · monitor off atb     Aortic aneurysm [facility])  Assessment & Plan  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 with patent graft, however increased size of maximum aneurysm sac diameter now measuring 4 9 compared to 4 6 cm in April 2020 with findings suggestive of an endoleak  · 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 also mildly enlarged from 2 0 cm in April 2020  · ED discussed with vascular surgery  Type 2 endoleak  No urgent intervention is necessary at this time  · We will hold off on anticoagulation for now given possibility of surgical intervention for hip repair  Consider further discussion with vascular surgery if anticoagulation is warranted, and risk versus benefit in this patient with history of dementia/falls       VTE Pharmacologic Prophylaxis: VTE Score: 8 High Risk (Score >/= 5) - Pharmacological DVT Prophylaxis Ordered: enoxaparin (Lovenox)  Sequential Compression Devices Ordered  Code Status: Level 1 - Full Code   Discussion with family: Attempted to update  (son) via phone  Left voicemail       Anticipated Length of Stay: Patient will be admitted on an "inpatient basis with an anticipated length of stay of greater than 2 midnights secondary to hip fracture  Total Time Spent on Date of Encounter in care of patient: 55 minutes This time was spent on one or more of the following: performing physical exam; counseling and coordination of care; obtaining or reviewing history; documenting in the medical record; reviewing/ordering tests, medications or procedures; communicating with other healthcare professionals and discussing with patient's family/caregivers  Chief Complaint:   Chief Complaint   Patient presents with   • Pain     Pt brought in via EMS from home  Hx of dementia  Reporting L hip pain/groin pain, back pain and abdominal pain  Per EMS pt family reporting decreased urinary output and constipation  History of Present Illness:  Fredy Maldonado is a 80 y o  male with a PMH of bladder cancer, CAD, dementia who presents with left hip pain  Pain is minimal at rest, worse with movement of left extremity  Typically ambulates with a walker  Patient denies additional concerns at this time other than \"getting something to eat  \"  History is limited due to dementia  Unable to reach son  Per ED report, patient ambulating normally until last evening when he was unable to stand up from the bath  Has been unable to bear weight since this time  Slipped off the couch approximately 2 weeks ago and fell on his bottom  No other falls are reported  Son feels he also has had decreased urine output over the past 24 hours  Review of Systems:  Vented due to dementia  Poor historian      Past Medical and Surgical History:   Past Medical History:   Diagnosis Date   • AMS (altered mental status) 4/20/2022   • Bladder cancer ([facility] [address] )    • CAD (coronary artery disease)    • Dyslipidemia    • History of heart artery stent    • Umbilical hernia        Past Surgical History:   Procedure Laterality Date   • BLADDER SURGERY     • CORONARY ANGIOPLASTY WITH STENT PLACEMENT  " • TRANSODONTOID FUSION N/A 5/4/2020    Procedure: Transodontoid Fusion;  Surgeon: Eros Mendoza MD;  [facility];  Service: Neurosurgery       Meds/Allergies:  Prior to Admission medications    Medication Sig Start Date End Date Taking? Authorizing Provider   acetaminophen (TYLENOL) 500 mg tablet Take 1 tablet (500 mg total) by mouth every 4 (four) hours as needed for moderate pain or headaches 4/28/20   Davy Rivera PA-C   aspirin 81 mg chewable tablet Chew 81 mg daily    Historical Provider, MD   guaiFENesin (ROBITUSSIN) 100 MG/5ML oral liquid Take 10 mL (200 mg total) by mouth every 4 (four) hours as needed for cough or congestion 4/22/22   Celia Moreno PA-C   niacin (NIASPAN) 500 mg CR tablet Take 500 mg by mouth    Historical Provider, MD   nicotine (NICODERM CQ) 14 mg/24hr TD 24 hr patch Place 1 patch on the skin daily 4/22/22   Lana Mae PA-C     I have reviewed home medications using recent Epic encounter  Allergies: Allergies   Allergen Reactions   • Iodine - Food Allergy      Other reaction(s): Unknown Reaction       Social History:  Marital Status:     Occupation:   Patient Pre-hospital Living Situation: Home  Patient Pre-hospital Level of Mobility: walks  Patient Pre-hospital Diet Restrictions:   Substance Use History:   Social History     Substance and Sexual Activity   Alcohol Use Yes     Social History     Tobacco Use   Smoking Status Every Day   • Packs/day: 0 50   • Types: Cigarettes   Smokeless Tobacco Never     Social History     Substance and Sexual Activity   Drug Use Never       Family History:  Family History   Family history unknown: Yes       Physical Exam:     Vitals:   Blood Pressure: 165/78 (05/13/23 2106)  Pulse: 64 (05/13/23 2106)  Temperature: 97 5 °F (36 4 °C) (05/13/23 2106)  Temp Source: Oral (05/13/23 2106)  Respirations: 18 (05/13/23 2106)  Weight - Scale: 61 9 kg (136 lb 7 4 oz) (05/13/23 1556)  SpO2: 94 % (05/13/23 2106)    Physical Exam  Vitals and nursing note reviewed  Constitutional:       General: He is not in acute distress  Appearance: He is well-developed  HENT:      Head: Normocephalic and atraumatic  Eyes:      General: No scleral icterus  Extraocular Movements: Extraocular movements intact  Conjunctiva/sclera: Conjunctivae normal       Pupils: Pupils are equal, round, and reactive to light  Cardiovascular:      Rate and Rhythm: Normal rate and regular rhythm  Heart sounds: No murmur heard  Pulmonary:      Effort: Pulmonary effort is normal  No respiratory distress  Breath sounds: Normal breath sounds  Abdominal:      Palpations: Abdomen is soft  Tenderness: There is no abdominal tenderness  Musculoskeletal:         General: No swelling  Cervical back: Neck supple  Comments: ROM Limited in left lower extremity secondary to pain  2+ pedal pulse present bilaterally   Skin:     General: Skin is warm and dry  Capillary Refill: Capillary refill takes less than 2 seconds  Neurological:      Mental Status: He is alert     Psychiatric:         Mood and Affect: Mood normal           Additional Data:     Lab Results:  Results from last 7 days   Lab Units 05/13/23  1641   WBC Thousand/uL 9 47   HEMOGLOBIN g/dL 13 0   HEMATOCRIT % 38 8   PLATELETS Thousands/uL 180   NEUTROS PCT % 85*   LYMPHS PCT % 8*   MONOS PCT % 7   EOS PCT % 0     Results from last 7 days   Lab Units 05/13/23  1641   SODIUM mmol/L 137   POTASSIUM mmol/L 4 4   CHLORIDE mmol/L 108   CO2 mmol/L 23   BUN mg/dL 40*   CREATININE mg/dL 1 24   ANION GAP mmol/L 6   CALCIUM mg/dL 11 1*   GLUCOSE RANDOM mg/dL 104                       Lines/Drains:  Invasive Devices     Peripheral Intravenous Line  Duration           Peripheral IV 05/13/23 Left;Ventral (anterior) Forearm <1 day          Drain  Duration           External Urinary Catheter Small <1 day                    Imaging: Reviewed radiology reports from this admission including: chest "CT scan and abdominal/pelvic CT  CT abdomen pelvis with contrast   Final Result by Roel Ortiz MD (05/13 1858)   Addendum (preliminary) 1 of 1 by Roel Ortiz MD (05/13 1858)   ADDENDUM: There is a typographical error in the body and impression of the    report  It should state there is a \"Acute , mildly displaced, comminuted    fracture of the LEFT acetabulum involving the anterior and posterior    columns  \"                  Final      Acute, mildly displaced, comminuted fracture of the right acetabulum involving the anterior and posterior columns  No other acute fractures  No hip dislocation  Perivesical fat stranding, suggestive of cystitis  Recommend correlation with urinalysis  Infrarenal abdominal aortic aneurysm status post EVAR with patent graft, however increased size of maximum aneurysm sac diameter now measuring 4 9 compared to 4 6 cm in April 2020 with findings suggestive of an endoleak  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

## 2023-05-14 NOTE — PROGRESS NOTES
[address] [place] [address]  Progress Note  Name: Nik Raines  MRN: [id number]  Unit/Bed#: -01 I Date of Admission: 5/13/2023   Date of Service: 5/14/2023 I Hospital Day: 1    Assessment/Plan   * Closed displaced fracture of left acetabulum [facility])  Assessment & Plan  Presents with L hip pain   · CT LE: Acute , mildly displaced, comminuted fracture of the LEFT acetabulum involving the anterior and posterior columns  · Ortho consult; likely procedure tomorrow  · Given hx of dementia will keep on strict bed rest overnight, NWB LLE    · PT/OT     Aortic aneurysm [facility])  Assessment & Plan  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 with patent graft, however increased size of maximum aneurysm sac diameter now measuring 4 9 compared to 4 6 cm in April 2020 with findings suggestive of an endoleak  · 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 also mildly enlarged from 2 0 cm in April 2020  · ED discussed with vascular surgery  Type 2 endoleak  No urgent intervention is necessary at this time  · We will hold off on anticoagulation for now given possibility of surgical intervention for hip repair  Consider further discussion with vascular surgery if anticoagulation is warranted, and risk versus benefit in this patient with history of dementia/falls  · Vascular surgery consulted    Pre-operative clearance  Assessment & Plan  · Patient denies shortness of breath or chest pain prior to fall  · Echocardiogram-EF 55% and wall motion normal; aortic valve mild to moderate regurgitation  · EKG-normal sinus rhythm  · Chest x-ray pending  · If normal chest x-ray patient would be moderate risk for low risk procedure    Abnormal CT of the abdomen  Assessment & Plan  · CT revealed perivesical fat stranding, suggestive of cystitis and patchy opacities at the lung bases, which may represent atelectasis versus pneumonia    · No leukocytosis or fevers   · UA negative · monitor off atb     CAD (coronary artery disease)  Assessment & Plan  · Unknown as to whether patient has had cardiac stents in the past as patient is unsure and son is unsure             VTE Pharmacologic Prophylaxis: VTE Score: 8 High Risk (Score >/= 5) - Pharmacological DVT Prophylaxis Contraindicated  Sequential Compression Devices Ordered  Patient Centered Rounds: I performed bedside rounds with nursing staff today  Discussions with Specialists or Other Care Team Provider: case management, orthopedic surgery     Education and Discussions with Family / Patient: Updated  (son) at bedside  Total Time Spent on Date of Encounter in care of patient: 45 minutes This time was spent on one or more of the following: performing physical exam; counseling and coordination of care; obtaining or reviewing history; documenting in the medical record; reviewing/ordering tests, medications or procedures; communicating with other healthcare professionals and discussing with patient's family/caregivers  Current Length of Stay: 1 day(s)  Current Patient Status: Inpatient   Certification Statement: The patient will continue to require additional inpatient hospital stay due to Hip fracture  Discharge Plan: Anticipate discharge in >72 hrs to discharge location to be determined pending rehab evaluations  Code Status: Level 1 - Full Code    Subjective:   Patient resting comfortably on examination  Patient is complaining of pain on his left side  Patient denies any other complaints on exam     Objective:     Vitals:   Temp (24hrs), Av °F (36 7 °C), Min:97 3 °F (36 3 °C), Max:98 6 °F (37 °C)    Temp:  [97 3 °F (36 3 °C)-98 6 °F (37 °C)] 97 3 °F (36 3 °C)  HR:  [59-76] 60  Resp:  [18-22] 18  BP: (130-190)/(65-78) 144/65  SpO2:  [93 %-96 %] 93 %  Body mass index is 19 51 kg/m²  Input and Output Summary (last 24 hours):      Intake/Output Summary (Last 24 hours) at 2023 1313  Last data filed at 5/14/2023 0001  Gross per 24 hour   Intake 1281 67 ml   Output 400 ml   Net 881 67 ml       Physical Exam:   Physical Exam  Vitals and nursing note reviewed  Constitutional:       General: He is not in acute distress  Appearance: He is well-developed  Comments: Chronically ill appearing   Hard of hearing   HENT:      Head: Normocephalic and atraumatic  Eyes:      General: No scleral icterus  Conjunctiva/sclera: Conjunctivae normal    Cardiovascular:      Rate and Rhythm: Normal rate and regular rhythm  Heart sounds: Normal heart sounds  No murmur heard  No friction rub  No gallop  Pulmonary:      Effort: Pulmonary effort is normal  No respiratory distress  Breath sounds: Normal breath sounds  No wheezing or rales  Abdominal:      General: Bowel sounds are normal  There is no distension  Palpations: Abdomen is soft  Tenderness: There is no abdominal tenderness  Musculoskeletal:         General: Normal range of motion  Skin:     General: Skin is warm  Findings: No rash  Neurological:      Mental Status: He is alert and oriented to person, place, and time            Additional Data:     Labs:  Results from last 7 days   Lab Units 05/14/23  0457 05/13/23  1641   WBC Thousand/uL 7 95 9 47   HEMOGLOBIN g/dL 11 2* 13 0   HEMATOCRIT % 33 2* 38 8   PLATELETS Thousands/uL 141* 180   NEUTROS PCT %  --  85*   LYMPHS PCT %  --  8*   MONOS PCT %  --  7   EOS PCT %  --  0     Results from last 7 days   Lab Units 05/14/23  0457   SODIUM mmol/L 137   POTASSIUM mmol/L 3 8   CHLORIDE mmol/L 110*   CO2 mmol/L 23   BUN mg/dL 29*   CREATININE mg/dL 1 01   ANION GAP mmol/L 4   CALCIUM mg/dL 9 9   ALBUMIN g/dL 3 4*   TOTAL BILIRUBIN mg/dL 0 94   ALK PHOS U/L 90   ALT U/L 13   AST U/L 17   GLUCOSE RANDOM mg/dL 89                       Lines/Drains:  Invasive Devices     Peripheral Intravenous Line  Duration           Peripheral IV 05/13/23 Left;Ventral (anterior) Forearm <1 day Drain  Duration           External Urinary Catheter Small <1 day                      Imaging: No pertinent imaging reviewed  Recent Cultures (last 7 days):         Last 24 Hours Medication List:   Current Facility-Administered Medications   Medication Dose Route Frequency Provider Last Rate   • acetaminophen  975 mg Oral Q8H [address] Alyssa Flynn PA-C     • aspirin  81 mg Oral Daily Sarah Lyles PA-C     • lidocaine  1 patch Topical Daily PRN Alyssa Flynn PA-C     • naloxone  0 04 mg Intravenous Q1MIN PRN Alyssa Flynn PA-C     • niacin  500 mg Oral Daily With MOHSEN Kwok     • nicotine  1 patch Transdermal Daily Sarah Lyles PA-C     • oxyCODONE  2 5 mg Oral Q6H PRN Alyssa Flynn PA-C     • polyethylene glycol  17 g Oral Daily PRN Alyssa Flynn PA-C          Today, Patient Was Seen By: Tiana Portillo DO    **Please Note: This note may have been constructed using a voice recognition system  **

## 2023-05-14 NOTE — ASSESSMENT & PLAN NOTE
Presents with L hip pain   · CT LE: Acute , mildly displaced, comminuted fracture of the LEFT acetabulum involving the anterior and posterior columns     · Ortho consult; likely procedure tomorrow  · Given hx of dementia will keep on strict bed rest overnight, NWB LLE    · PT/OT

## 2023-05-14 NOTE — CONSULTS
Assessment and Plan     80year old male with right acetabular fracture  Dr Nohemi Jarvis and Dr Eugenia Perla already aware of patient  Dr Jones Kocher for surgical fixation tomorrow if family agreeable  Dr Eugenia Perla planning to reach out to son  Do advice medical workup for surgical clearance in case they do decide to proceed  In addition, pt should be NPO at midnight  Continue to be NWB LLE  Chief Complaint     Left hip pain      History of the Present Illness     Araseli De La Garza is a 80 y o  male who presented to the ED yesterday with left groin, back and abd pain  Pt is a poor historian  Per records when other providers spoke with pt's son, he typically walks around with a walker  Pt had been walking slower than normal walking towards his bath and then was unable to stand to get out of the bath and has been unable to weight bear since  They recall pt slipping off couch approximately 2 weeks ago and fell on his bottom, but are unaware of any other falls  Pt today states he did fall, but he can't remember when  He denies having pain in the hip prior to this  Denies pain anywhere else  Past Medical History:   Diagnosis Date   • AMS (altered mental status) 4/20/2022   • Bladder cancer ([facility] [address] )    • CAD (coronary artery disease)    • Dyslipidemia    • History of heart artery stent    • Umbilical hernia        Past Surgical History:   Procedure Laterality Date   • BLADDER SURGERY     • CORONARY ANGIOPLASTY WITH STENT PLACEMENT     • TRANSODONTOID FUSION N/A 5/4/2020    Procedure: Transodontoid Fusion;  Surgeon: Raghavendra Dickson MD;  Location[facility];  Service: Neurosurgery       Allergies   Allergen Reactions   • Iodine - Food Allergy      Other reaction(s): Unknown Reaction       No current facility-administered medications on file prior to encounter       Current Outpatient Medications on File Prior to Encounter   Medication Sig Dispense Refill   • acetaminophen (TYLENOL) 500 mg tablet Take 1 tablet (500 mg "total) by mouth every 4 (four) hours as needed for moderate pain or headaches 30 tablet 0   • aspirin 81 mg chewable tablet Chew 81 mg daily     • guaiFENesin (ROBITUSSIN) 100 MG/5ML oral liquid Take 10 mL (200 mg total) by mouth every 4 (four) hours as needed for cough or congestion 120 mL 0   • niacin (NIASPAN) 500 mg CR tablet Take 500 mg by mouth     • nicotine (NICODERM CQ) 14 mg/24hr TD 24 hr patch Place 1 patch on the skin daily 28 patch 0       Social History     Tobacco Use   • Smoking status: Every Day     Packs/day: 0 50     Types: Cigarettes   • Smokeless tobacco: Never   Vaping Use   • Vaping Use: Never used   Substance Use Topics   • Alcohol use: Yes   • Drug use: Never       Family History   Family history unknown: Yes       Review of Systems     As stated in the HPI  All other systems were reviewed and are negative  Physical Exam     /63   Pulse 64   Temp 97 6 °F (36 4 °C) (Oral)   Resp 18   Ht 5' 10\" (1 778 m)   Wt 61 7 kg (136 lb)   SpO2 93%   BMI 19 51 kg/m²     GENERAL: This is a well-developed, well-nourished, age-appropriate patient in no acute distress  The patient has difficulty remembering details and it is slightly hard to keep him focused  Otherwise, pleasant and cooperative  Eyes: Anicteric sclerae  Extraocular movements appear intact  HENT: Nares are patent with no drainage  Lungs: There is equal chest rise on inspection  Breathing is non-labored with no audible wheezing  Cardiovascular: No cyanosis  No upper extremity lymphadema  Skin: Skin is warm to touch  No obvious skin lesions or rashes other than described below  Neurologic: No ataxia  Psychiatric: Mood and affect are appropriate  LLE:  + pain with ROM of the hip   + log roll  Nontender to palpation of knee, lower leg, ankle or foot  Calf soft and compressible  SILT L2-S1   + EHL/FHL, + ankle dorsi/plantar flexion  Toes warm and well perfused      Data Review     Results Reviewed     Procedure " Component Value Units Date/Time    CBC (With Platelets) [[id number]]  (Abnormal) Collected: 05/14/23 0457    Lab Status: Final result Specimen: Blood from Arm, Right Updated: 05/14/23 0512     WBC 7 95 Thousand/uL      RBC 3 48 Million/uL      Hemoglobin 11 2 g/dL      Hematocrit 33 2 %      MCV 95 fL      MCH 32 2 pg      MCHC 33 7 g/dL      RDW 14 4 %      Platelets 555 Thousands/uL      MPV 9 8 fL     Urine Microscopic [[id number]]  (Abnormal) Collected: 05/13/23 1833    Lab Status: Final result Specimen: Urine Updated: 05/13/23 1834     RBC, UA 1-2 /hpf      WBC, UA 4-10 /hpf      Epithelial Cells None Seen /hpf      Bacteria, UA None Seen /hpf      MUCUS THREADS Occasional    UA w Reflex to Microscopic w Reflex to Culture [[id number]]  (Abnormal) Collected: 05/13/23 1833    Lab Status: Final result Specimen: Urine Updated: 05/13/23 1833     Color, UA Yellow     Clarity, UA Clear     Specific Gravity, UA 1 041     pH, UA 5 5     Leukocytes, UA Negative     Nitrite, UA Negative     Protein, UA 70 (1+) mg/dl      Glucose, UA Negative mg/dl      Ketones, UA Trace mg/dl      Urobilinogen, UA <2 0 mg/dl      Bilirubin, UA Negative     Occult Blood, UA Trace    Basic metabolic panel [[id number]]  (Abnormal) Collected: 05/13/23 1641    Lab Status: Final result Specimen: Blood from Arm, Right Updated: 05/13/23 1702     Sodium 137 mmol/L      Potassium 4 4 mmol/L      Chloride 108 mmol/L      CO2 23 mmol/L      ANION GAP 6 mmol/L      BUN 40 mg/dL      Creatinine 1 24 mg/dL      Glucose 104 mg/dL      Calcium 11 1 mg/dL      eGFR 51 ml/min/1 73sq m     Narrative:      [place] guidelines for Chronic Kidney Disease (CKD):   •  Stage 1 with normal or high GFR (GFR > 90 mL/min/1 73 square meters)  •  Stage 2 Mild CKD (GFR = 60-89 mL/min/1 73 square meters)  •  Stage 3A Moderate CKD (GFR = 45-59 mL/min/1 73 square meters)  •  Stage 3B Moderate CKD (GFR = 30-44 mL/min/1 73 square meters)  •  Stage 4 Severe CKD (GFR = 15-29 mL/min/1 73 square meters)  •  Stage 5 End Stage CKD (GFR <15 mL/min/1 73 square meters)  Note: GFR calculation is accurate only with a steady state creatinine    CBC and differential [[id number]]  (Abnormal) Collected: 05/13/23 1641    Lab Status: Final result Specimen: Blood from Arm, Right Updated: 05/13/23 1647     WBC 9 47 Thousand/uL      RBC 4 02 Million/uL      Hemoglobin 13 0 g/dL      Hematocrit 38 8 %      MCV 97 fL      MCH 32 3 pg      MCHC 33 5 g/dL      RDW 14 2 %      MPV 9 9 fL      Platelets 543 Thousands/uL      nRBC 0 /100 WBCs      Neutrophils Relative 85 %      Immat GRANS % 0 %      Lymphocytes Relative 8 %      Monocytes Relative 7 %      Eosinophils Relative 0 %      Basophils Relative 0 %      Neutrophils Absolute 7 95 Thousands/µL      Immature Grans Absolute 0 04 Thousand/uL      Lymphocytes Absolute 0 76 Thousands/µL      Monocytes Absolute 0 69 Thousand/µL      Eosinophils Absolute 0 01 Thousand/µL      Basophils Absolute 0 02 Thousands/µL              Imaging:  CT of abd/pelvis and XR of hip/pelvis reviewed which demonstrate acute mildly displaced comminuted fx of the LEFT acetabulum involving the anterior and posterior columns  No other acute fractures  Please note that although the CT report states right acetabular fx, the images were reviewed by myself and a colleague and verified that it is the left side which is fractured

## 2023-05-14 NOTE — PHYSICAL THERAPY NOTE
"Physical Therapy Cancellation Note    PT order received  Chart review performed  At this time, PT evaluation cancelled secondary to patient pending ortho consult for \"acute, mildly displaced, comminuted fracture of the left acetabulum involving the anterior and posterior columns  \" PT will follow and evaluate as appropriate  05/14/23 3724   PT Last Visit   PT Visit Date 05/14/23   Note Type   Note type Evaluation; Cancelled Session   Cancel Reasons Medical status     Luna Lim, PT, DPT    "

## 2023-05-14 NOTE — ASSESSMENT & PLAN NOTE
· CT revealed perivesical fat stranding, suggestive of cystitis and patchy opacities at the lung bases, which may represent atelectasis versus pneumonia    · No leukocytosis or fevers   · UA negative   · monitor off atb

## 2023-05-15 ENCOUNTER — ANESTHESIA EVENT (INPATIENT)
Dept: PERIOP | Facility: HOSPITAL | Age: 88
End: 2023-05-15

## 2023-05-15 ENCOUNTER — APPOINTMENT (INPATIENT)
Dept: RADIOLOGY | Facility: HOSPITAL | Age: 88
End: 2023-05-15

## 2023-05-15 ENCOUNTER — ANESTHESIA (INPATIENT)
Dept: PERIOP | Facility: HOSPITAL | Age: 88
End: 2023-05-15

## 2023-05-15 LAB
ANION GAP SERPL CALCULATED.3IONS-SCNC: 5 MMOL/L (ref 4–13)
BUN SERPL-MCNC: 25 MG/DL (ref 5–25)
CALCIUM SERPL-MCNC: 9.8 MG/DL (ref 8.4–10.2)
CHLORIDE SERPL-SCNC: 111 MMOL/L (ref 96–108)
CO2 SERPL-SCNC: 22 MMOL/L (ref 21–32)
CREAT SERPL-MCNC: 0.99 MG/DL (ref 0.6–1.3)
ERYTHROCYTE [DISTWIDTH] IN BLOOD BY AUTOMATED COUNT: 13.9 % (ref 11.6–15.1)
GFR SERPL CREATININE-BSD FRML MDRD: 68 ML/MIN/1.73SQ M
GLUCOSE SERPL-MCNC: 89 MG/DL (ref 65–140)
HCT VFR BLD AUTO: 34.7 % (ref 36.5–49.3)
HGB BLD-MCNC: 11.6 G/DL (ref 12–17)
MCH RBC QN AUTO: 32.1 PG (ref 26.8–34.3)
MCHC RBC AUTO-ENTMCNC: 33.4 G/DL (ref 31.4–37.4)
MCV RBC AUTO: 96 FL (ref 82–98)
PLATELET # BLD AUTO: 158 THOUSANDS/UL (ref 149–390)
PMV BLD AUTO: 9.7 FL (ref 8.9–12.7)
POTASSIUM SERPL-SCNC: 4.1 MMOL/L (ref 3.5–5.3)
RBC # BLD AUTO: 3.61 MILLION/UL (ref 3.88–5.62)
SODIUM SERPL-SCNC: 138 MMOL/L (ref 135–147)
WBC # BLD AUTO: 7.32 THOUSAND/UL (ref 4.31–10.16)

## 2023-05-15 PROCEDURE — 0QS504Z REPOSITION LEFT ACETABULUM WITH INTERNAL FIXATION DEVICE, OPEN APPROACH: ICD-10-PCS | Performed by: ORTHOPAEDIC SURGERY

## 2023-05-15 DEVICE — IMPLANTABLE DEVICE: Type: IMPLANTABLE DEVICE | Site: ACETABULUM | Status: FUNCTIONAL

## 2023-05-15 RX ORDER — METOCLOPRAMIDE HYDROCHLORIDE 5 MG/ML
10 INJECTION INTRAMUSCULAR; INTRAVENOUS ONCE AS NEEDED
Status: DISCONTINUED | OUTPATIENT
Start: 2023-05-15 | End: 2023-05-15 | Stop reason: HOSPADM

## 2023-05-15 RX ORDER — FENTANYL CITRATE 50 UG/ML
INJECTION, SOLUTION INTRAMUSCULAR; INTRAVENOUS AS NEEDED
Status: DISCONTINUED | OUTPATIENT
Start: 2023-05-15 | End: 2023-05-15

## 2023-05-15 RX ORDER — CEFAZOLIN SODIUM 1 G/50ML
SOLUTION INTRAVENOUS AS NEEDED
Status: DISCONTINUED | OUTPATIENT
Start: 2023-05-15 | End: 2023-05-15

## 2023-05-15 RX ORDER — MAGNESIUM HYDROXIDE 1200 MG/15ML
LIQUID ORAL AS NEEDED
Status: DISCONTINUED | OUTPATIENT
Start: 2023-05-15 | End: 2023-05-15 | Stop reason: HOSPADM

## 2023-05-15 RX ORDER — DEXAMETHASONE SODIUM PHOSPHATE 10 MG/ML
INJECTION, SOLUTION INTRAMUSCULAR; INTRAVENOUS AS NEEDED
Status: DISCONTINUED | OUTPATIENT
Start: 2023-05-15 | End: 2023-05-15

## 2023-05-15 RX ORDER — ROCURONIUM BROMIDE 10 MG/ML
INJECTION, SOLUTION INTRAVENOUS AS NEEDED
Status: DISCONTINUED | OUTPATIENT
Start: 2023-05-15 | End: 2023-05-15

## 2023-05-15 RX ORDER — BUPIVACAINE HYDROCHLORIDE 2.5 MG/ML
INJECTION, SOLUTION EPIDURAL; INFILTRATION; INTRACAUDAL AS NEEDED
Status: DISCONTINUED | OUTPATIENT
Start: 2023-05-15 | End: 2023-05-15 | Stop reason: HOSPADM

## 2023-05-15 RX ORDER — LIDOCAINE HYDROCHLORIDE 10 MG/ML
INJECTION, SOLUTION EPIDURAL; INFILTRATION; INTRACAUDAL; PERINEURAL AS NEEDED
Status: DISCONTINUED | OUTPATIENT
Start: 2023-05-15 | End: 2023-05-15

## 2023-05-15 RX ORDER — DIPHENHYDRAMINE HYDROCHLORIDE 50 MG/ML
12.5 INJECTION INTRAMUSCULAR; INTRAVENOUS ONCE AS NEEDED
Status: DISCONTINUED | OUTPATIENT
Start: 2023-05-15 | End: 2023-05-15 | Stop reason: HOSPADM

## 2023-05-15 RX ORDER — SODIUM CHLORIDE, SODIUM LACTATE, POTASSIUM CHLORIDE, CALCIUM CHLORIDE 600; 310; 30; 20 MG/100ML; MG/100ML; MG/100ML; MG/100ML
INJECTION, SOLUTION INTRAVENOUS CONTINUOUS PRN
Status: DISCONTINUED | OUTPATIENT
Start: 2023-05-15 | End: 2023-05-15

## 2023-05-15 RX ORDER — LIDOCAINE HYDROCHLORIDE 10 MG/ML
0.5 INJECTION, SOLUTION EPIDURAL; INFILTRATION; INTRACAUDAL; PERINEURAL ONCE AS NEEDED
Status: DISCONTINUED | OUTPATIENT
Start: 2023-05-15 | End: 2023-05-15 | Stop reason: HOSPADM

## 2023-05-15 RX ORDER — ONDANSETRON 2 MG/ML
4 INJECTION INTRAMUSCULAR; INTRAVENOUS ONCE AS NEEDED
Status: DISCONTINUED | OUTPATIENT
Start: 2023-05-15 | End: 2023-05-15 | Stop reason: HOSPADM

## 2023-05-15 RX ORDER — HYDROMORPHONE HCL 110MG/55ML
PATIENT CONTROLLED ANALGESIA SYRINGE INTRAVENOUS AS NEEDED
Status: DISCONTINUED | OUTPATIENT
Start: 2023-05-15 | End: 2023-05-15

## 2023-05-15 RX ORDER — ONDANSETRON 2 MG/ML
INJECTION INTRAMUSCULAR; INTRAVENOUS AS NEEDED
Status: DISCONTINUED | OUTPATIENT
Start: 2023-05-15 | End: 2023-05-15

## 2023-05-15 RX ORDER — ENOXAPARIN SODIUM 100 MG/ML
40 INJECTION SUBCUTANEOUS
Status: DISCONTINUED | OUTPATIENT
Start: 2023-05-16 | End: 2023-05-25 | Stop reason: HOSPADM

## 2023-05-15 RX ORDER — HYDROMORPHONE HCL/PF 1 MG/ML
0.5 SYRINGE (ML) INJECTION
Status: DISCONTINUED | OUTPATIENT
Start: 2023-05-15 | End: 2023-05-15 | Stop reason: HOSPADM

## 2023-05-15 RX ORDER — FENTANYL CITRATE/PF 50 MCG/ML
50 SYRINGE (ML) INJECTION
Status: DISCONTINUED | OUTPATIENT
Start: 2023-05-15 | End: 2023-05-15 | Stop reason: HOSPADM

## 2023-05-15 RX ORDER — PROPOFOL 10 MG/ML
INJECTION, EMULSION INTRAVENOUS AS NEEDED
Status: DISCONTINUED | OUTPATIENT
Start: 2023-05-15 | End: 2023-05-15

## 2023-05-15 RX ORDER — KETAMINE HCL IN NACL, ISO-OSM 100MG/10ML
SYRINGE (ML) INJECTION AS NEEDED
Status: DISCONTINUED | OUTPATIENT
Start: 2023-05-15 | End: 2023-05-15

## 2023-05-15 RX ORDER — CEFAZOLIN SODIUM 1 G/50ML
1000 SOLUTION INTRAVENOUS ONCE
Status: DISCONTINUED | OUTPATIENT
Start: 2023-05-15 | End: 2023-05-15 | Stop reason: HOSPADM

## 2023-05-15 RX ORDER — CEFAZOLIN SODIUM 1 G/50ML
1000 SOLUTION INTRAVENOUS EVERY 8 HOURS
Status: COMPLETED | OUTPATIENT
Start: 2023-05-15 | End: 2023-05-16

## 2023-05-15 RX ORDER — HYDROMORPHONE HCL/PF 1 MG/ML
0.2 SYRINGE (ML) INJECTION
Status: DISCONTINUED | OUTPATIENT
Start: 2023-05-15 | End: 2023-05-15 | Stop reason: HOSPADM

## 2023-05-15 RX ORDER — LABETALOL HYDROCHLORIDE 5 MG/ML
10 INJECTION, SOLUTION INTRAVENOUS
Status: DISCONTINUED | OUTPATIENT
Start: 2023-05-15 | End: 2023-05-15 | Stop reason: HOSPADM

## 2023-05-15 RX ADMIN — LABETALOL HYDROCHLORIDE 10 MG: 5 INJECTION, SOLUTION INTRAVENOUS at 14:55

## 2023-05-15 RX ADMIN — ASPIRIN 81 MG: 81 TABLET, CHEWABLE ORAL at 18:52

## 2023-05-15 RX ADMIN — NICOTINE 1 PATCH: 14 PATCH, EXTENDED RELEASE TRANSDERMAL at 08:27

## 2023-05-15 RX ADMIN — FENTANYL CITRATE 25 MCG: 50 INJECTION, SOLUTION INTRAMUSCULAR; INTRAVENOUS at 14:26

## 2023-05-15 RX ADMIN — DEXAMETHASONE SODIUM PHOSPHATE 10 MG: 10 INJECTION, SOLUTION INTRAMUSCULAR; INTRAVENOUS at 12:35

## 2023-05-15 RX ADMIN — LIDOCAINE HYDROCHLORIDE 50 MG: 10 INJECTION, SOLUTION EPIDURAL; INFILTRATION; INTRACAUDAL; PERINEURAL at 12:35

## 2023-05-15 RX ADMIN — ACETAMINOPHEN 975 MG: 325 TABLET, FILM COATED ORAL at 05:29

## 2023-05-15 RX ADMIN — SUGAMMADEX 121 MG: 100 INJECTION, SOLUTION INTRAVENOUS at 14:33

## 2023-05-15 RX ADMIN — SODIUM CHLORIDE, SODIUM LACTATE, POTASSIUM CHLORIDE, AND CALCIUM CHLORIDE: .6; .31; .03; .02 INJECTION, SOLUTION INTRAVENOUS at 11:45

## 2023-05-15 RX ADMIN — CEFAZOLIN SODIUM 1000 MG: 1 SOLUTION INTRAVENOUS at 20:00

## 2023-05-15 RX ADMIN — CEFAZOLIN SODIUM 1000 MG: 1 SOLUTION INTRAVENOUS at 12:36

## 2023-05-15 RX ADMIN — PROPOFOL 100 MG: 10 INJECTION, EMULSION INTRAVENOUS at 12:35

## 2023-05-15 RX ADMIN — HYDROMORPHONE HYDROCHLORIDE 0.6 MG: 2 INJECTION, SOLUTION INTRAMUSCULAR; INTRAVENOUS; SUBCUTANEOUS at 13:03

## 2023-05-15 RX ADMIN — ONDANSETRON 4 MG: 2 INJECTION INTRAMUSCULAR; INTRAVENOUS at 14:12

## 2023-05-15 RX ADMIN — FENTANYL CITRATE 100 MCG: 50 INJECTION, SOLUTION INTRAMUSCULAR; INTRAVENOUS at 12:35

## 2023-05-15 RX ADMIN — Medication 500 MG: at 18:52

## 2023-05-15 RX ADMIN — Medication 20 MG: at 12:47

## 2023-05-15 RX ADMIN — ACETAMINOPHEN 975 MG: 325 TABLET, FILM COATED ORAL at 18:57

## 2023-05-15 RX ADMIN — ROCURONIUM BROMIDE 50 MG: 10 INJECTION, SOLUTION INTRAVENOUS at 12:35

## 2023-05-15 NOTE — ASSESSMENT & PLAN NOTE
Presents with L hip pain   · CT LE: Acute , mildly displaced, comminuted fracture of the LEFT acetabulum involving the anterior and posterior columns     · Orthopedic surgery following and plan for procedure today  · Given hx of dementia will keep on strict bed rest overnight, NWB LLE    · PT/OT

## 2023-05-15 NOTE — ANESTHESIA PREPROCEDURE EVALUATION
Procedure:  Percutaneous fixation L acetabulum fracture (Left: Pelvis)    Relevant Problems   ANESTHESIA (within normal limits)      CARDIO   (+) Aortic aneurysm (HCC)   (+) CAD (coronary artery disease)      ENDO (within normal limits)      GI/HEPATIC   (+) Liver cyst      /RENAL   (+) Renal cyst      HEMATOLOGY (within normal limits)      MUSCULOSKELETAL (within normal limits)      NEURO/PSYCH (within normal limits)      PULMONARY (within normal limits)      Musculoskeletal and Integument   (+) Closed displaced fracture of left acetabulum (HCC)      Genitourinary   (+) Bladder cancer (HCC)      Other   (+) H/O heart artery stent        Physical Exam    Airway    Mallampati score: II  TM Distance: >3 FB  Neck ROM: full     Dental   No notable dental hx     Cardiovascular  Rhythm: regular, Rate: normal, Cardiovascular exam normal    Pulmonary  Pulmonary exam normal Breath sounds clear to auscultation,     Other Findings        Anesthesia Plan  ASA Score- 3     Anesthesia Type- general with ASA Monitors  Additional Monitors:   Airway Plan: ETT  Plan Factors-Exercise tolerance (METS): >4 METS  Chart reviewed  EKG reviewed  Imaging results reviewed  Existing labs reviewed  Patient summary reviewed  Induction- intravenous  Postoperative Plan- Plan for postoperative opioid use  Informed Consent- Anesthetic plan and risks discussed with patient, son and healthcare power of   I personally reviewed this patient with the CRNA  Discussed and agreed on the Anesthesia Plan with the CRNA  Bronson Farrell           Recent labs personally reviewed:  Lab Results   Component Value Date    WBC 7 32 05/15/2023    HGB 11 6 (L) 05/15/2023     05/15/2023     Lab Results   Component Value Date    K 4 1 05/15/2023    BUN 25 05/15/2023    CREATININE 0 99 05/15/2023     Lab Results   Component Value Date    PTT 26 04/20/2022      Lab Results   Component Value Date    INR 1 06 04/20/2022       Blood type O    Lab Results   Component Value Date    HGBA1C 5 7 (H) 05/03/2020       I, Neville Kawasaki, MD, have personally seen and evaluated the patient prior to anesthetic care  I have reviewed the pre-anesthetic record, and other medical records if appropriate to the anesthetic care  If a CRNA is involved in the case, I have reviewed the CRNA assessment, if present, and agree  Risks/benefits and alternatives discussed with patient including possible PONV, sore throat, and possibility of rare anesthetic and surgical emergencies

## 2023-05-15 NOTE — ANESTHESIA POSTPROCEDURE EVALUATION
Post-Op Assessment Note    CV Status:  Stable    Pain management: adequate     Mental Status:  Lethargic   Hydration Status:  Euvolemic   PONV Controlled:  Controlled   Airway Patency:  Patent      Post Op Vitals Reviewed: Yes      Staff: CRNA         No notable events documented      BP   166/72   Temp  98 3   Pulse  70   Resp   16   SpO2   98

## 2023-05-15 NOTE — PLAN OF CARE
Problem: Potential for Falls  Goal: Patient will remain free of falls  Description: INTERVENTIONS:  - Educate patient/family on patient safety including physical limitations  - Instruct patient to call for assistance with activity   - Consult OT/PT to assist with strengthening/mobility   - Keep Call bell within reach  - Keep bed low and locked with side rails adjusted as appropriate  - Keep care items and personal belongings within reach  - Initiate and maintain comfort rounds  - Make Fall Risk Sign visible to staff  - Offer Toileting every 1Hour, in advance of need  - Initiate/Maintain bed alarm  - Obtain necessary fall risk management equipment:   - Apply yellow socks and bracelet for high fall risk patients  - Consider moving patient to room near nurses station  Outcome: Progressing

## 2023-05-15 NOTE — PROGRESS NOTES
Progress Note - Orthopedics   Alyssa Jean Baptiste 80 y o  male MRN: [id number]  Unit/Bed#: [facility] ECHO      Subjective:    79 yo male currently admitted secondary to a left acetabular fracture  Patient states he does continue have pain throughout his hip associated range of motion of the hip  Patient states he has been compliant with nonweightbearing striction of the left lower extremity  Patient is unaware of place and time but is aware of self upon questioning  Patient denies any new or worsening symptoms in the leg  Patient denies any fevers any chills  Patient denies any shortness of chest tightness chest pain  Patient denies any abdominal pain    Patient offers no other complaints at this time      Labs:  0   Lab Value Date/Time    HCT 34 7 (L) 05/15/2023 0524    HCT 33 2 (L) 05/14/2023 0457    HCT 38 8 05/13/2023 1641    HGB 11 6 (L) 05/15/2023 0524    HGB 11 2 (L) 05/14/2023 0457    HGB 13 0 05/13/2023 1641    INR 1 06 04/20/2022 1828    WBC 7 32 05/15/2023 0524    WBC 7 95 05/14/2023 0457    WBC 9 47 05/13/2023 1641       Meds:    Current Facility-Administered Medications:   •  acetaminophen (TYLENOL) tablet 975 mg, 975 mg, Oral, Q8H [facility], Rosanna Ulloa PA-C, 975 mg at 05/15/23 0318  •  aspirin chewable tablet 81 mg, 81 mg, Oral, Daily, Sarah Lyles PA-C, 81 mg at 05/14/23 0945  •  lidocaine (LIDODERM) 5 % patch 1 patch, 1 patch, Topical, Daily PRN, Aric Davis PA-C  •  naloxone (NARCAN) 0 04 mg/mL syringe 0 04 mg, 0 04 mg, Intravenous, Q1MIN PRN, Aric Davis PA-C  •  niacin (NIASPAN) CR tablet 500 mg, 500 mg, Oral, Daily With Lindy Sneed PA-C, 500 mg at 05/14/23 1616  •  nicotine (NICODERM CQ) 14 mg/24hr TD 24 hr patch 1 patch, 1 patch, Transdermal, Daily, Sarha Rubio PA-C, 1 patch at 05/14/23 0945  •  oxyCODONE (ROXICODONE) split tablet 2 5 mg, 2 5 mg, Oral, Q6H PRN, Aric Davis PA-C, 2 5 mg at 05/14/23 0945  •  polyethylene glycol ([facility]) "packet 17 g, 17 g, Oral, Daily PRN, Papo Merritt PA-C    Blood Culture:   Lab Results   Component Value Date    BLOODCX No Growth After 5 Days  04/20/2022    BLOODCX Bacillus species NOT anthracis (A) 04/20/2022       Wound Culture:   No results found for: WOUNDCULT    Ins and Outs:  I/O last 24 hours: In: 480 [P O :480]  Out: 200 [Urine:200]        Physical:  Vitals:    05/15/23 0736   BP: 138/68   Pulse: 59   Resp: 16   Temp: 97 5 °F (36 4 °C)   SpO2: 95%     Musculoskeletal examination :  LLE:  + pain with ROM of the hip   + log roll  Nontender to palpation of knee, lower leg, ankle or foot  Calf soft and compressible  SILT L2-S1   + EHL/FHL, + ankle dorsi/plantar flexion  Toes warm and well perfused  Assessment:    80 y  o male with an Acute, mildly displaced, comminuted fracture of the left acetabulum involving the anterior and posterior columns  Plan:  · NWB LLE   · OR today for operative fixation of the left acetabulum and all other associated procedures  · Informed consent will be obtained with son when available secondary to patient's dementia  · CONTINUE     NPO   STATUS    · PT/OT for postoperative care and assessment   · Pain control per primary   · DVT ppx per primary   · Medical co-morbidities include CAD, aortic aneurysm, dementia which are being managed per primary team  · Dispo: Ortho will follow  See above for additional recommendations  Plan for OR this afternoon with Dr Elli Roberson pending OR availability  Vs  Medical clearances                   Bianca Ramirez PA-C                        Portions of the record may have been created with voice recognition software  Occasional wrong word or \"sound a like\" substitutions may have occurred due to the inherent limitations of voice recognition software  Read the chart carefully and recognize, using context, where substitutions have occurred      "

## 2023-05-15 NOTE — PROGRESS NOTES
[address] [place] [address]  Progress Note  Name: Yadiel Grier  MRN: [id number]  Unit/Bed#: -01 I Date of Admission: 5/13/2023   Date of Service: 5/15/2023 I Hospital Day: 2    Assessment/Plan   * Closed displaced fracture of left acetabulum [facility])  Assessment & Plan  Presents with L hip pain   · CT LE: Acute , mildly displaced, comminuted fracture of the LEFT acetabulum involving the anterior and posterior columns  · Orthopedic surgery following and plan for procedure today  · Given hx of dementia will keep on strict bed rest overnight, NWB LLE    · PT/OT     Pre-operative clearance  Assessment & Plan  · Patient denies shortness of breath or chest pain prior to fall  · Echocardiogram-EF 55% and wall motion normal; aortic valve mild to moderate regurgitation  · EKG-normal sinus rhythm  · Chest x-ray no acute cardiopulmonary disease noted on my read  · RCRI- class II risk 6%, 30-day risk of death, MI or cardiac arrest  · Patient is be moderate risk for low risk procedure    Aortic aneurysm [facility])  Assessment & Plan  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 with patent graft, however increased size of maximum aneurysm sac diameter now measuring 4 9 compared to 4 6 cm in April 2020 with findings suggestive of an endoleak  · 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 also mildly enlarged from 2 0 cm in April 2020  · ED discussed with vascular surgery  Type 2 endoleak  No urgent intervention is necessary at this time  · We will hold off on anticoagulation for now given possibility of surgical intervention for hip repair    Consider further discussion with vascular surgery if anticoagulation is warranted, and risk versus benefit in this patient with history of dementia/falls  · Did discuss with vascular surgery and they do recommend outpatient follow-up; will give referral on discharge    Abnormal CT of the abdomen  Assessment & Plan  · CT revealed perivesical fat stranding, suggestive of cystitis and patchy opacities at the lung bases, which may represent atelectasis versus pneumonia  · No leukocytosis or fevers   · UA negative   · monitor off atb     CAD (coronary artery disease)  Assessment & Plan  · Unknown as to whether patient has had cardiac stents in the past as patient is unsure and son is unsure  · Echocardiogram noted             VTE Pharmacologic Prophylaxis: VTE Score: 8 Moderate Risk (Score 3-4) - Pharmacological DVT Prophylaxis Contraindicated  Sequential Compression Devices Ordered  Patient Centered Rounds: I performed bedside rounds with nursing staff today  Discussions with Specialists or Other Care Team Provider: Orthopedic surgery, case management    Education and Discussions with Family / Patient: Updated  (son) via phone  Total Time Spent on Date of Encounter in care of patient: 43 minutes This time was spent on one or more of the following: performing physical exam; counseling and coordination of care; obtaining or reviewing history; documenting in the medical record; reviewing/ordering tests, medications or procedures; communicating with other healthcare professionals and discussing with patient's family/caregivers  Current Length of Stay: 2 day(s)  Current Patient Status: Inpatient   Certification Statement: The patient will continue to require additional inpatient hospital stay due to Hip fracture  Discharge Plan: Anticipate discharge in 48 hrs to discharge location to be determined pending rehab evaluations  Code Status: Level 1 - Full Code    Subjective:   Patient resting comfortably on examination  Patient had no overnight events or complaints on exam this morning      Objective:     Vitals:   Temp (24hrs), Av 5 °F (36 4 °C), Min:97 4 °F (36 3 °C), Max:97 6 °F (36 4 °C)    Temp:  [97 4 °F (36 3 °C)-97 6 °F (36 4 °C)] 97 5 °F (36 4 °C)  HR:  [59-71] 59  Resp:  [16-17] 16  BP: (138-158)/(63-69) 138/68  SpO2:  [92 %-95 %] 95 %  Body mass index is 19 2 kg/m²  Input and Output Summary (last 24 hours): Intake/Output Summary (Last 24 hours) at 5/15/2023 0848  Last data filed at 5/15/2023 0401  Gross per 24 hour   Intake 480 ml   Output 200 ml   Net 280 ml       Physical Exam:   Physical Exam  Vitals and nursing note reviewed  Constitutional:       General: He is not in acute distress  Appearance: He is well-developed  HENT:      Head: Normocephalic and atraumatic  Eyes:      General: No scleral icterus  Conjunctiva/sclera: Conjunctivae normal    Cardiovascular:      Rate and Rhythm: Normal rate and regular rhythm  Heart sounds: Normal heart sounds  No murmur heard  No friction rub  No gallop  Pulmonary:      Effort: Pulmonary effort is normal  No respiratory distress  Breath sounds: Normal breath sounds  No wheezing or rales  Abdominal:      General: Bowel sounds are normal  There is no distension  Palpations: Abdomen is soft  Tenderness: There is no abdominal tenderness  Musculoskeletal:         General: Normal range of motion  Skin:     General: Skin is warm  Findings: No rash  Neurological:      Mental Status: He is alert  Mental status is at baseline  Additional Data:     Labs:  Results from last 7 days   Lab Units 05/15/23  0524 05/14/23  0457 05/13/23  1641   WBC Thousand/uL 7 32   < > 9 47   HEMOGLOBIN g/dL 11 6*   < > 13 0   HEMATOCRIT % 34 7*   < > 38 8   PLATELETS Thousands/uL 158   < > 180   NEUTROS PCT %  --   --  85*   LYMPHS PCT %  --   --  8*   MONOS PCT %  --   --  7   EOS PCT %  --   --  0    < > = values in this interval not displayed       Results from last 7 days   Lab Units 05/15/23  0524 05/14/23  0457   SODIUM mmol/L 138 137   POTASSIUM mmol/L 4 1 3 8   CHLORIDE mmol/L 111* 110*   CO2 mmol/L 22 23   BUN mg/dL 25 29*   CREATININE mg/dL 0 99 1 01   ANION GAP mmol/L 5 4   CALCIUM mg/dL 9 8 9 9   ALBUMIN g/dL  --  3 4*   TOTAL BILIRUBIN mg/dL  --  0 94   ALK PHOS U/L  --  90   ALT U/L  --  13   AST U/L  --  17   GLUCOSE RANDOM mg/dL 89 89                       Lines/Drains:  Invasive Devices     Peripheral Intravenous Line  Duration           Peripheral IV 05/14/23 Left;Ventral (anterior) Forearm 1 day          Drain  Duration           External Urinary Catheter Small 1 day                      Imaging: No pertinent imaging reviewed  Recent Cultures (last 7 days):         Last 24 Hours Medication List:   Current Facility-Administered Medications   Medication Dose Route Frequency Provider Last Rate   • acetaminophen  975 mg Oral Q8H [address] Prudence Choudhary PA-C     • aspirin  81 mg Oral Daily Sarah Lyles PA-C     • lidocaine  1 patch Topical Daily PRN Prudence Choudhary PA-C     • naloxone  0 04 mg Intravenous Q1MIN PRN Prudence Choudhary PA-C     • niacin  500 mg Oral Daily With MOHSEN Kwok     • nicotine  1 patch Transdermal Daily Sarah Lyles PA-C     • oxyCODONE  2 5 mg Oral Q6H PRN Prudence Choudhary PA-C     • polyethylene glycol  17 g Oral Daily PRN Prudence Choudhary PA-C          Today, Patient Was Seen By: Leandra Winston DO    **Please Note: This note may have been constructed using a voice recognition system  **

## 2023-05-15 NOTE — ASSESSMENT & PLAN NOTE
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

## 2023-05-15 NOTE — ASSESSMENT & PLAN NOTE
· Patient denies shortness of breath or chest pain prior to fall  · Echocardiogram-EF 55% and wall motion normal; aortic valve mild to moderate regurgitation  · EKG-normal sinus rhythm  · Chest x-ray no acute cardiopulmonary disease noted on my read  · RCRI- class II risk 6%, 30-day risk of death, MI or cardiac arrest  · Patient is be moderate risk for low risk procedure

## 2023-05-15 NOTE — OP NOTE
OPERATIVE REPORT  PATIENT NAME: Brice Aden    :  1935  MRN: [id number]  Pt Location: [facility] OR ROOM 04    SURGERY DATE: 5/15/2023    Surgeon(s) and Role:     * Tali Leahy MD - Primary     * Alhaji Waldron PA-C - Assisting    Preop Diagnosis:  Left acetabular fracture ([address] ) [S32 402A]    Post-Op Diagnosis Codes:     * Left acetabular fracture ([address] ) [S32 402A]    Procedure(s):  ORIF L transverse acetabulum fracture (CPT 37239)    Specimen(s):  * No specimens in log *    Estimated Blood Loss:   50 mL    Drains:  External Urinary Catheter Small (Active)   Number of days: 2       [REMOVED] Urethral Catheter Non-latex 16 Fr  (Removed)   Number of days: 0       Anesthesia Type:   General    Operative Indications:  Acetabulum fracture involving two columns, dementia precluding ability to comply with weight bearing restrictions, ambulatory patient, severe pain    Operative Findings:  See below    Complications:   None    Implants: Synthes 6 5 mm cannulated screws x2    Procedure and Technique:  The patient is an 41-year-old male who ambulates with walker at baseline who sustained a left acetabulum fracture that involves both the anterior and posterior columns  He does have a essentially complete transverse acetabulum fracture however fortunately the fracture is not significantly displaced making it amenable to minimally invasive style fixation  I did discuss options with the son including nonoperative and operative options  Risks are documented in chart otherwise in today's progress note; both patient and son elected for operative management of fracture  The patient operative site, laterality, procedure, consent were verified in the preoperative area and the patient was taken to the operating room  General anesthesia was induced and the patient was turned in the prone position  All bony prominences were padded    The left hip and upper thigh and buttock were prepped and draped in the usual sterile fashion  After timeout, starting point was localized under fluoroscopic imaging and we first proceeded with posterior column fixation  Vertical incision was made over the left buttock area and tonsil was utilized to spread down to the level of the ischium taking care to protect all soft tissue and neurovascular structures in this area  The soft tissue guide was utilized to advance guidewire into the proper trajectory which was verified on fluoroscopic imaging  After the trajectory was verified to be in a safe trajectory outside of the true pelvis and hip joint, drilling took place and a 6 5 mm cannulated screw was placed which had satisfactory bony purchase across the fracture site  Attention was then turned to the anterior column fixation  A 2 0 mm Steinmann pin was utilized and firm starting trajectory and the skin incision was made over the lateral proximal hip  Soft tissue was spread with tonsil and started trajectory with Steinmann pin was obtained  The drill was to oscillate over the wire and fine-tune the trajectory of the screw  Initial pin was removed and additional Steinmann pin was advanced utilizing blunt end with gentle mallet blows to ensure that we remained inside the bony corridor of the anterior column  After wire had traversed the fracture and we were satisfied with ending point, length was measured, drilling took place and 6 5 mm cannulated screw was placed which has satisfactory purchase  Final fluoroscopic imaging confirmed satisfactory and safe placement of both in the anterior and posterior columns  No fracture displacement was noted  The wounds were copiously irrigated with sterile saline  Deep layer closures took place with Vicryl suture, subcutaneous layer closure to place with Vicryl sutures, skin layer closure to place with nylon sutures  Sterile dressings were applied  All final counts were correct  I was present for the entire procedure    The patient was extubated, awakened, and taken to the PACU in stable condition  There were no immediate complications  There was no qualified resident available for the procedure  Critical assistance from Phillip Falcon PA-C was required for all components of the procedure including but not limited to positioning, soft tissue retraction, passage of instrumentation  Was particularly important given the soft tissue and neurovascular structures at risk particularly during posterior column screw placement as well as prone positioning  The patient may be 50% flatfoot weightbearing on the operative extremity with use of walker  We will utilize Lovenox for DVT prophylaxis  We will consider suture removal in 2 weeks  It will be critical for nursing staff to offload left hip given the posterior location of his incision      Patient Disposition:  PACU         SIGNATURE: Michaelle Pacheco MD  DATE: May 15, 2023  TIME: 3:28 PM immune

## 2023-05-15 NOTE — UTILIZATION REVIEW
Initial Clinical Review    Admission: Date/Time/Statement:   Admission Orders (From admission, onward)     Ordered        05/13/23 2001  INPATIENT ADMISSION  Once                      Orders Placed This Encounter   Procedures   • INPATIENT ADMISSION     Standing Status:   Standing     Number of Occurrences:   1     Order Specific Question:   Level of Care     Answer:   Med Surg [16]     Order Specific Question:   Estimated length of stay     Answer:   More than 2 Midnights     Order Specific Question:   Certification     Answer:   I certify that inpatient services are medically necessary for this patient for a duration of greater than two midnights  See H&P and MD Progress Notes for additional information about the patient's course of treatment  ED Arrival Information     Expected   -    Arrival   5/13/2023 15:49    Acuity   Emergent            Means of arrival   Ambulance    Escorted by   [facility]   Hospitalist    Admission type   Emergency            Arrival complaint   groin pain           Chief Complaint   Patient presents with   • Pain     Pt brought in via EMS from home  Hx of dementia  Reporting L hip pain/groin pain, back pain and abdominal pain  Per EMS pt family reporting decreased urinary output and constipation  Initial Presentation: 80 y o  male to the ED from home via EMS with complaints of pain left hip , groin area  Admitted to inpatient for closed displaced fracture of left acetabulum, abnormal ct abdomen  Aortic aneurysm  H/O bladder cancer, CAD, dementia   Arrives with GCs 15, confused  Decreased urinary output, constipation as per family at home  History limited due to dementia  Xray left hip shows: Acute, mildly displaced, comminuted fracture of the right acetabulum involving the anterior and posterior columns  Given IV fluids in the ED  ORtho consult     Date: 5/14   Day 2:  CT a/p: Infrarenal abdominal aortic aneurysm status post EVAR with patent graft, however increased size of maximum aneurysm sac diameter now measuring 4 9 compared to 4 6 cm in April 2020 with findings suggestive of an endoleak  Vascular surgery consult  ORtho:  Minimally displaced fracture  NWB LLE   NPO at [facility] for surgical intervention  About 2 weeks prior, he slipped off couch   Date: 5/15    Day 3: Has surpassed a 2nd midnight with active treatments and services     OPERATIVE REPORT  SURGERY DATE: 5/15/2023   Procedure(s):  ORIF L transverse acetabulum fracture (CPT 27055)  Anesthesia Type:   General    ED Triage Vitals   Temperature Pulse Respirations Blood Pressure SpO2   05/13/23 1556 05/13/23 1556 05/13/23 1556 05/13/23 1556 05/13/23 1556   98 6 °F (37 °C) 76 18 153/68 94 %      Temp Source Heart Rate Source Patient Position - Orthostatic VS BP Location FiO2 (%)   05/13/23 1556 05/13/23 2003 05/13/23 1556 05/13/23 1556 --   Oral Monitor Sitting Left arm       Pain Score       05/13/23 2106       8          Wt Readings from Last 1 Encounters:   05/15/23 60 7 kg (133 lb 12 8 oz)     Additional Vital Signs:   /Time Temp Pulse Resp BP MAP (mmHg) SpO2 O2 Flow Rate (L/min) O2 Device Cardiac (WDL) Patient Position - Orthostatic VS   05/15/23 1629 -- 63 18 156/83 -- 95 % 2 L/min Nasal cannula -- --   05/15/23 16:13:31 -- 63 -- 188/86 Abnormal  120 94 % -- -- -- --   05/15/23 1545 -- 60 18 180/73 Abnormal  -- 98 % 2 L/min Nasal cannula WDL --   BP: labetalol adm at 05/15/23 1500   BP: dr mccallum aware at 05/15/23 1443   05/15/23 1114 97 4 °F (36 3 °C) Abnormal  59 18 162/73 -- 96 % -- None (Room air) -- --   05/15/23 0736 97 5 °F (36 4 °C) 59 16 138/68 91 95 % -- -- -- --   05/14/23 23:48:26 97 4 °F (36 3 °C) Abnormal  71 17 140/69 93 92 % -- -- -- --   05/14/23 1500 97 6 °F (36 4 °C) 64 -- 158/63 102 93 % -- None (Room air) -- --   05/14/23 1100 -- 60 -- 144/65 -- -- -- -- -- --   05/14/23 07:35:22 97 3 °F (36 3 °C) Abnormal  59 -- 144/65 91 93 % -- -- -- --   05/14/23 0400 98 3 °F (36 8 °C) "64 18 133/75 96 96 % -- None (Room air) -- Lying   05/14/23 0000 98 2 °F (36 8 °C) 63 18 130/74 98 95 % -- None (Room air) -- Lying   05/13/23 21:06:55 97 5 °F (36 4 °C) 64 18 165/78 107 94 % -- None (Room air) -- Lying   05/13/23 2003 -- 73 22 190/76 Abnormal  -- 93 % -- None (Room air) -- Lying   05/13/23 1800 -- 73 20 168/75 108 -- -- -- -- --   05/13/23 1556 98 6 °F (37 °C) 76 18 153/68 -- 94 % -- None (Room air) -- Sitting       Pertinent Labs/Diagnostic Test Results:   5/14 echo:Left Ventricle: Left ventricular cavity size is normal  Wall thickness is normal  The left ventricular ejection fraction is 55%  Systolic function is normal  Wall motion is normal  Diastolic function is normal   •  Aortic Valve: There is mild to moderate regurgitation  •  Mitral Valve: There is mild annular calcification  •  Tricuspid Valve: Estimated RVSP 35 mm Hg    5/13 EKG:  Normal sinus rhythm  Left ventricular hypertrophy with repolarization abnormality  Abnormal ECG  XR pelvis complete 3+ vw   Final Result by Gerardo Bravo MD (05/15 8299)      Fluoroscopic guidance provided for procedure guidance  Please refer to the separate procedure notes for additional details  Workstation performed: [id number]         XR chest portable   Final Result by Mt Aguilar MD (05/15 5162)   Bibasilar density seen which may be due to atelectasis or infiltrate      Increased lung markings may be due to mild congestion or hypoinflation      Workstation performed: [id number]         CT abdomen pelvis with contrast   Final Result by Armando Power MD (05/13 1858)   Addendum (preliminary) 1 of 1 by Armando Power MD (05/13 1858)   ADDENDUM: There is a typographical error in the body and impression of the    report  It should state there is a \"Acute , mildly displaced, comminuted    fracture of the LEFT acetabulum involving the anterior and posterior    columns  \"                  Final      Acute, mildly displaced, " comminuted fracture of the right acetabulum involving the anterior and posterior columns  No other acute fractures  No hip dislocation  Perivesical fat stranding, suggestive of cystitis  Recommend correlation with urinalysis  Infrarenal abdominal aortic aneurysm status post EVAR with patent graft, however increased size of maximum aneurysm sac diameter now measuring 4 9 compared to 4 6 cm in April 2020 with findings suggestive of an endoleak  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 also mildly enlarged from 2 0 cm in April 2020  Patchy opacities at the lung bases, which may represent atelectasis versus pneumonia  Additional findings as above  The study was marked in [facility] for immediate notification              Workstation performed: [id number]         XR hip/pelv 2-3 vws left   Final Result by Natasha Mejía MD (05/14 1010)      Posterior acetabular fracture            Workstation performed: [id number]             Results from last 7 days   Lab Units 05/15/23  0524 05/14/23  0457 05/13/23  1641   WBC Thousand/uL 7 32 7 95 9 47   HEMOGLOBIN g/dL 11 6* 11 2* 13 0   HEMATOCRIT % 34 7* 33 2* 38 8   PLATELETS Thousands/uL 158 141* 180   NEUTROS ABS Thousands/µL  --   --  7 95*         Results from last 7 days   Lab Units 05/15/23  0524 05/14/23  0457 05/13/23  1641   SODIUM mmol/L 138 137 137   POTASSIUM mmol/L 4 1 3 8 4 4   CHLORIDE mmol/L 111* 110* 108   CO2 mmol/L 22 23 23   ANION GAP mmol/L 5 4 6   BUN mg/dL 25 29* 40*   CREATININE mg/dL 0 99 1 01 1 24   EGFR ml/min/1 73sq m 68 66 51   CALCIUM mg/dL 9 8 9 9 11 1*     Results from last 7 days   Lab Units 05/14/23  0457   AST U/L 17   ALT U/L 13   ALK PHOS U/L 90   TOTAL PROTEIN g/dL 6 7   ALBUMIN g/dL 3 4*   TOTAL BILIRUBIN mg/dL 0 94         Results from last 7 days   Lab Units 05/15/23  0524 05/14/23  0457 05/13/23  1641   GLUCOSE RANDOM mg/dL 89 89 104             Results from last 7 days   Lab Units 05/13/23  1833   CLARITY UA  Clear   COLOR UA  Yellow   SPEC GRAV UA  1 041*   PH UA  5 5   GLUCOSE UA mg/dl Negative   KETONES UA mg/dl Trace*   BLOOD UA  Trace*   PROTEIN UA mg/dl 70 (1+)*   NITRITE UA  Negative   BILIRUBIN UA  Negative   UROBILINOGEN UA (BE) mg/dl <2 0   LEUKOCYTES UA  Negative   WBC UA /hpf 4-10*   RBC UA /hpf 1-2   BACTERIA UA /hpf None Seen   EPITHELIAL CELLS WET PREP /hpf None Seen   MUCUS THREADS  Occasional*       ED Treatment:   Medication Administration from 05/13/2023 1549 to 05/13/2023 2045       Date/Time Order Dose Route Action Comments     05/13/2023 1813 EDT lactated ringers bolus 1,000 mL 1,000 mL Intravenous New Bag --        Past Medical History:   Diagnosis Date   • AMS (altered mental status) 4/20/2022   • Bladder cancer (HCC)    • CAD (coronary artery disease)    • Dyslipidemia    • History of heart artery stent    • Umbilical hernia        Admitting Diagnosis: Pain [R52]  Left acetabular fracture (HCC) [S32 402A]  History of dementia [Z86 59]  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]  Age/Sex: 80 y o  male  Admission Orders:  Scheduled Medications:  acetaminophen, 975 mg, Oral, Q8H [address]  aspirin, 81 mg, Oral, Daily  cefazolin, 1,000 mg, Intravenous, Q8H  [START ON 5/16/2023] enoxaparin, 40 mg, Subcutaneous, Q24H [address]  niacin, 500 mg, Oral, Daily With Dinner  nicotine, 1 patch, Transdermal, Daily      Continuous IV Infusions:     PRN Meds:  lidocaine, 1 patch, Topical, Daily PRN  naloxone, 0 04 mg, Intravenous, Q1MIN PRN  oxyCODONE, 2 5 mg, Oral, Q6H PRN  polyethylene glycol, 17 g, Oral, Daily PRN        IP CONSULT TO ORTHOPEDIC SURGERY    Network Utilization Review Department  ATTENTION: Please call with any questions or concerns to [phone] and carefully listen to the prompts so that you are directed to the right person   All voicemails are confidential   Melissa Burleson all requests for admission clinical reviews, approved or denied determinations and any other requests to dedicated fax number below belonging to the campus where the patient is receiving treatment   List of dedicated fax numbers for the Facilities:  [address] [place] [address] DENIALS (Administrative/Medical Necessity) [phone]   [address]  (Maternity/NICU/Pediatrics) [phone] [address] [phone]   [address] [phone]   [address] [address] Maeve Ibarra [facility] 28 [phone]   1552 [facility] 134 815 [facility] [phone]

## 2023-05-15 NOTE — ASSESSMENT & PLAN NOTE
· Unknown as to whether patient has had cardiac stents in the past as patient is unsure and son is unsure  · Echocardiogram noted

## 2023-05-15 NOTE — OCCUPATIONAL THERAPY NOTE
Occupational Therapy Cancellation Note        Patient Name: Temo Davies  WMKFE'P Date: 5/15/2023     05/15/23 1447   OT Last Visit   OT Visit Date 05/15/23   Note Type   Note type Evaluation; Cancelled Session   Cancel Reasons Patient to operating room   Additional Comments Pt is unavailable as he is off the floor in the OR  OT will continue to follow and provided OT services when appropriate         Uzma Friedman MS OTR/L

## 2023-05-15 NOTE — QUICK NOTE
I was able to order both budesonide and potassium, but I am also unsure how to order the trapeze?    Danny Paige MD, MD    Updated by nursing, patient noted to have pink-tinged urine with small blood clot noted during urination  Does not currently have jenkins in place, although had surgical hip replacement earlier today, so one may have been placed during OR procedure  To continue monitoring urine output; urinary retention protocol ordered  If persists to consider jenkins placement, manual irrigation, and urology consultation

## 2023-05-16 ENCOUNTER — APPOINTMENT (INPATIENT)
Dept: VASCULAR ULTRASOUND | Facility: HOSPITAL | Age: 88
End: 2023-05-16

## 2023-05-16 LAB
ANION GAP SERPL CALCULATED.3IONS-SCNC: 6 MMOL/L (ref 4–13)
BUN SERPL-MCNC: 27 MG/DL (ref 5–25)
CALCIUM SERPL-MCNC: 9.9 MG/DL (ref 8.4–10.2)
CHLORIDE SERPL-SCNC: 110 MMOL/L (ref 96–108)
CO2 SERPL-SCNC: 20 MMOL/L (ref 21–32)
CREAT SERPL-MCNC: 1.07 MG/DL (ref 0.6–1.3)
ERYTHROCYTE [DISTWIDTH] IN BLOOD BY AUTOMATED COUNT: 14 % (ref 11.6–15.1)
GFR SERPL CREATININE-BSD FRML MDRD: 62 ML/MIN/1.73SQ M
GLUCOSE SERPL-MCNC: 142 MG/DL (ref 65–140)
HCT VFR BLD AUTO: 33.7 % (ref 36.5–49.3)
HGB BLD-MCNC: 11.2 G/DL (ref 12–17)
MCH RBC QN AUTO: 32.3 PG (ref 26.8–34.3)
MCHC RBC AUTO-ENTMCNC: 33.2 G/DL (ref 31.4–37.4)
MCV RBC AUTO: 97 FL (ref 82–98)
PLATELET # BLD AUTO: 174 THOUSANDS/UL (ref 149–390)
PMV BLD AUTO: 9.9 FL (ref 8.9–12.7)
POTASSIUM SERPL-SCNC: 4.4 MMOL/L (ref 3.5–5.3)
RBC # BLD AUTO: 3.47 MILLION/UL (ref 3.88–5.62)
SODIUM SERPL-SCNC: 136 MMOL/L (ref 135–147)
WBC # BLD AUTO: 12.66 THOUSAND/UL (ref 4.31–10.16)

## 2023-05-16 RX ADMIN — CEFAZOLIN SODIUM 1000 MG: 1 SOLUTION INTRAVENOUS at 03:01

## 2023-05-16 RX ADMIN — ACETAMINOPHEN 975 MG: 325 TABLET, FILM COATED ORAL at 02:43

## 2023-05-16 RX ADMIN — ASPIRIN 81 MG: 81 TABLET, CHEWABLE ORAL at 10:24

## 2023-05-16 RX ADMIN — ENOXAPARIN SODIUM 40 MG: 40 INJECTION SUBCUTANEOUS at 02:43

## 2023-05-16 RX ADMIN — Medication 500 MG: at 17:35

## 2023-05-16 RX ADMIN — SODIUM CHLORIDE 250 ML: 0.9 INJECTION, SOLUTION INTRAVENOUS at 03:11

## 2023-05-16 RX ADMIN — NICOTINE 1 PATCH: 14 PATCH, EXTENDED RELEASE TRANSDERMAL at 10:24

## 2023-05-16 RX ADMIN — ACETAMINOPHEN 975 MG: 325 TABLET, FILM COATED ORAL at 10:25

## 2023-05-16 RX ADMIN — ACETAMINOPHEN 975 MG: 325 TABLET, FILM COATED ORAL at 20:21

## 2023-05-16 NOTE — CASE MANAGEMENT
Case Management Assessment & Discharge Planning Note    Patient name Chelly Magaña  Location [address] [id number] MRN [id number]  : 1935 Date 2023       Current Admission Date: 2023  Current Admission Diagnosis:Closed displaced fracture of left acetabulum [facility])   Patient Active Problem List    Diagnosis Date Noted   • Pre-operative clearance 2023   • Closed displaced fracture of left acetabulum ([facility] ) 2023   • Aortic aneurysm ([facility] ) 2023   • Abnormal CT of the abdomen 2023   • Positive blood culture 2022   • Delirium 2020   • Fall 2020   • Bl[facility] ) 2020   • CAD (coronary artery disease) 2020   • H/O heart artery stent    • Umbilical hernia    • Odontoid fracture with type II morphology ([facility] ) 2020   • Multiple abrasions 2020   • Nasal bones, closed fracture 2020   • Atelectasis 2020   • Pulmonary nodule 2020   • Renal cyst 2020   • Liver cyst 2020      LOS (days): 3  Geometric Mean LOS (GMLOS) (days): 2 70  Days to GMLOS:0 1     OBJECTIVE:    Risk of Unplanned Readmission Score: 13 19      Current admission status: Inpatient     Preferred Pharmacy:   [address] [place] [address], [facility] [address] [place] [address]  Phone: [phone] Fax: [phone]    Primary Care Provider: Ginette Valencia PA-C    Primary Insurance: [facility]  Secondary Insurance:     ASSESSMENT:  [address], [address] [facility],[address] - Son   Primary Phone: [phone] (Mobile)               Readmission Root Cause  30 Day Readmission: No    Patient Information  Admitted from[de-identified] Home  Mental Status: Confused  Assessment information provided by[de-identified] Son  Primary Caregiver: Self  Support Systems: Son, Family members  [place] of Residence: [place] [address] do you live in?: [address] [place] entry access options   Select all that apply : No steps to enter home  Type of Current Residence: 2 story home  Upon entering residence, is there a bedroom on the main floor (no further steps)?: Yes  Upon entering residence, is there a bathroom on the main floor (no further steps)?: Yes  In the last 12 months, was there a time when you were not able to pay the mortgage or rent on time?: No  In the last 12 months, how many places have you lived?: 1  In the last 12 months, was there a time when you did not have a steady place to sleep or slept in a shelter (including now)?: No  Homeless/housing insecurity resource given?: N/A  Living Arrangements: Lives w/ Son (Lives with son & dil    Patient has 1st floor apt, son & dil on 2nd floor )    Activities of Daily Living Prior to Admission  Functional Status: Independent  Completes ADLs independently?: Yes  Ambulates independently?: Yes  Does patient use assisted devices?: Yes  Assisted Devices (DME) used: [employer]  Does patient currently own DME?: Yes  What DME does the patient currently own?: [employer] Newman  Does patient have a history of Outpatient Therapy (PT/OT)?: No  Does the patient have a history of Short-Term Rehab?: Yes ([address])  Does patient have a history of HHC?: Yes (Unable to recall name )  Does patient currently have [facility]?: No      Patient Information Continued  Income Source: Pension/FCI  Does patient have prescription coverage?: No  Within the past 12 months, you worried that your food would run out before you got the money to buy more : Never true  Within the past 12 months, the food you bought just didn't last and you didn't have money to get more : Never true  Food insecurity resource given?: N/A  Does patient receive dialysis treatments?: No  Does patient have a history of substance abuse?: No  Does patient have a history of Mental Health Diagnosis?: No    PHQ 2/9 Screening   Reviewed PHQ 2/9 Depression Screening Score?: No    Means of Transportation  [facility] Family transport  In the past 12 months, has lack of transportation kept you from medical appointments or from getting medications?: No  In the past 12 months, has lack of transportation kept you from meetings, work, or from getting things needed for daily living?: No  Was application for public transport provided?: N/A        DISCHARGE DETAILS:    Discharge planning discussed with[de-identified] Patient's son - Domonique Law - via phone  Freedom of Choice: Yes  Comments - Freedom of Choice: FOC reviewed - family agreeable to STR placement if necessary, however preference is for patient to return home due to dementia  Patient has been to [facility] in the past and is agreeable to placement there again if needed  Has used [address] in the past however was unable to recall name  Agreeable to referrals pending recommendations  CM contacted family/caregiver?: Yes  Were Treatment Team discharge recommendations reviewed with patient/caregiver?: Yes (As it pertains to d/c planning & CM role )  Did patient/caregiver verbalize understanding of patient care needs?: Yes (As it pertains to d/c planning & CM role )  Were patient/caregiver advised of the risks associated with not following Treatment Team discharge recommendations?: Yes (As it pertains to d/c planning & CM role )    Contacts  Patient Contacts: Domonique Law (son)  Relationship to Patient[de-identified] Family  Contact Method: Phone  Phone Number: [phone]  Reason/Outcome: Continuity of Care, Discharge Planning      DME Referral Provided  Referral made for DME?: No    Other Referral/Resources/Interventions Provided:  Interventions: Short Term Rehab  Referral Comments: STR referral in [employer] [facility])      Would you like to participate in our [address] service program?  : No - Declined

## 2023-05-16 NOTE — PHYSICAL THERAPY NOTE
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)    CAD (coronary artery disease)    H/O heart artery stent    Umbilical hernia    Positive blood culture    Closed displaced fracture of left acetabulum (HCC)    Aortic aneurysm (HCC)    Abnormal CT of the abdomen    Pre-operative clearance     Past Medical History  Past Medical History:   Diagnosis Date    AMS (altered mental status) 4/20/2022    Bladder cancer ([facility] [address] )     CAD (coronary artery disease)     Dyslipidemia     History of heart artery stent     Umbilical hernia      Past Surgical History  Past Surgical History:   Procedure Laterality Date    BLADDER SURGERY      CORONARY ANGIOPLASTY WITH STENT PLACEMENT      TRANSODONTOID FUSION N/A 5/4/2020    Procedure: Transodontoid Fusion;  Surgeon: Raghavendra Dickson MD;  [facility];  Service: Neurosurgery      05/16/23 0725   PT Last Visit   PT Visit Date 05/16/23   Note Type   Note type Evaluation and Treatment   Pain Assessment   Pain Assessment Tool 0-10   Pain Score No Pain   Restrictions/Precautions   Weight Bearing Precautions Per Order Yes   LLE Weight Bearing Per Order (S)  PWB  (50% foot flat weight bearing per ortho)   Other Precautions Cognitive; Chair Alarm; Bed Alarm;WBS;Fall Risk   Home Living   Type of Home House   Home Layout Multi-level; Able to live on main level with bedroom/bathroom; Performs ADLs on one level  (1 RICHI)   Bathroom Shower/Tub Tub/shower unit   Bathroom Toilet Standard   Bathroom Equipment Shower chair;Grab bars in shower;Grab bars around toilet "  Bathroom Accessibility Accessible   Home Equipment Walker;Cane   Additional Comments Pt ambulates with a walker  Prior Function   Level of [place] Needs assistance with functional mobility; Needs assistance with ADLs; Needs assistance with IADLS   Lives With Son;Family   Receives Help From Family   IADLs Family/Friend/Other provides transportation; Family/Friend/Other provides meals; Family/Friend/Other provides medication management   Falls in the last 6 months (S)  5 to 10  (1 fall PTA)   Vocational Retired   Comments Pt is a questionable historian due to impaired cognition  Clarification regarding home setup and PLOF is required  General   Family/Caregiver Present No   Cognition   Overall Cognitive Status Impaired   Arousal/Participation Alert   Attention Attends with cues to redirect   Orientation Level Oriented to person;Oriented to place; Disoriented to time;Disoriented to situation   Memory Decreased recall of recent events;Decreased short term memory;Decreased recall of precautions   Following Commands Follows one step commands without difficulty   Comments Pt agreeable to PT  Subjective   Subjective \"I can get up  \"   RLE Assessment   RLE Assessment X   Strength RLE   RLE Overall Strength 4-/5   LLE Assessment   LLE Assessment X   Strength LLE   L Hip Flexion 3-/5   L Knee Extension 3/5   L Ankle Dorsiflexion 3+/5   Light Touch   RLE Light Touch Grossly intact   LLE Light Touch Grossly intact   Bed Mobility   Supine to Sit 4  Minimal assistance   Additional items Assist x 2;HOB elevated; Bedrails; Increased time required;Verbal cues;LE management   Transfers   Sit to Stand 4  Minimal assistance   Additional items Assist x 2; Increased time required;Verbal cues   Stand to Sit 4  Minimal assistance   Additional items Assist x 2; Increased time required;Verbal cues;Armrests   Ambulation/Elevation   Gait pattern Decreased toe off;Decreased heel strike;Decreased hip extension; Excessively slow; Step to;Short " stride; Shuffling;Decreased L stance; Antalgic; Improper Weight shift   Gait Assistance 4  Minimal assist   Additional items Assist x 2;Verbal cues   Assistive Device Rolling walker   Distance 5 feet, bed to chair   Ambulation/Elevation Additional Comments Pt requires max verbal cues to maintain PWB on left lower extremity  Further ambulation deferred due to inconsistent with maintaining appropraite WB   Balance   Static Sitting Fair +   Dynamic Sitting Fair   Static Standing Poor +   Dynamic Standing Poor   Ambulatory Poor   Endurance Deficit   Endurance Deficit Yes   Endurance Deficit Description decreased activity tolerance   Activity Tolerance   Activity Tolerance Patient tolerated treatment well;Patient limited by pain   Medical Staff Made Aware OT [facility] skilled nursing  (Co-evaluation performed with OT secondary to complex medical condition of patient and regression of functional status from baseline  PT/OT goals were addressed separately )   Nurse Made Aware Discussed case with UYEN Ling   Assessment   Prognosis Good   Problem List Decreased strength;Decreased endurance; Impaired balance;Decreased mobility; Decreased cognition;Orthopedic restrictions   Assessment Pt is 80year old male seen for PT evaluation s/p admit to [facility] on 5/13/2023 with Closed displaced fracture of left acetabulum ([address] )  PT consulted to assess pt's functional mobility and discharge needs  Order placed for PT evaluation and treatment order  Comorbidities affecting pt's physical performance at time of assessment include CAD, aortic aneurysm, and abnormal CT of the abdomen  Prior to hospitalization, pt was independent with all functional mobility with a walker  Pt ambulates household distances  Pt resides with his son and family, in a multi-level house, but is able to stay on the first floor, with one step to enter   Personal factors affecting pt at time of initial evaluation include step to enter home, ambulating with an assistive device, inability to ambulate household distances, inability to navigate level surfaces without external assistance, unable to perform dynamic tasks in the community, inability to live alone, positive fall history, difficulty performing ADLs, and inability to perform IADLs  Please find objective findings from PT assessment regarding body systems outlined above with impairments and limitations including weakness, impaired balance, decreased endurance, gait deviations, decreased activity tolerance, decreased functional mobility tolerance, fall risk, orthopedic restrictions, and decreased cognition  The following objective measures were performed on initial evaluation Barthel Index: 45/100, Modified Humphreys: 4 (moderate/severe disability) and AM-PAC 6-Clicks: 16/87  Pt's clinical presentation is currently unstable/unpredictable seen in pt's presentation of need for ongoing medical management/monitoring, pt is a fall risk, pt has orthopedic restrictions limiting functional mobility, and pt requires cues and assist of two for safety with functional mobility  Pt to benefit from continued PT treatment to address deficits as defined above and maximize pt's level of function and independence with mobility  From a PT standpoint, recommendation at time of discharge would be STR pending pt's progress in order to facilitate return to prior level of function     Barriers to Discharge Inaccessible home environment;Decreased caregiver support   Goals   STG Expiration Date 05/26/23   Short Term Goal #1 In 10 days: Increase bilateral LE strength 1/2 grade to facilitate independent mobility, Perform all bed mobility tasks with CG assist to decrease caregiver burden, Perform all transfers with CG assist to improve independence, while maintaining PWB on L LE, Ambulate > 50 ft  with RW with min A of 1 w/o LOB and while maintaining PWB on L % of the time, and Increase all balance 1/2 grade to decrease risk for falls   PT Treatment Day 1 Plan   Treatment/Interventions Functional transfer training;LE strengthening/ROM; Therapeutic exercise; Endurance training;Cognitive reorientation;Patient/family training;Bed mobility;Gait training;Spoke to nursing;Spoke to advanced practitioner;OT   PT Frequency 4-6x/wk   Recommendation   PT Discharge Recommendation Post acute rehabilitation services   AM-PAC Basic Mobility Inpatient   Turning in Flat Bed Without Bedrails 2   Lying on Back to Sitting on Edge of Flat Bed Without Bedrails 2   Moving Bed to Chair 2   Standing Up From Chair Using Arms 2   Walk in Room 1   Climb 3-5 Stairs With Railing 1   Basic Mobility Inpatient Raw Score 10   Turning Head Towards Sound 4   Follow Simple Instructions 4   Low Function Basic Mobility Raw Score  18   Low Function Basic Mobility Standardized Score  29 25   Highest Level Of Mobility   -HLM Goal 4: Move to chair/commode   -HLM Achieved 4: Move to chair/commode   Modified McLennan Scale   Modified McLennan Scale 4   Barthel Index   Feeding 10   Bathing 0   Grooming Score 0   Dressing Score 5   Bladder Score 10   Bowels Score 10   Toilet Use Score 5   Transfers (Bed/Chair) Score 5   Mobility (Level Surface) Score 0   Stairs Score 0   Barthel Index Score 45   Additional Treatment Session   Start Time 0746   End Time 0756   Treatment Assessment Pt agreeable to PT treatment session following PT evaluation  Pt performed seated therapeutic exercise as indicated below  Pt required verbal and visual cues and minimal tactile cues for correct technique and form  Pt tolerated therapeutic exercise well without complaints of pain  Pt continues to exhibit decreased lower extremity strength, impaired balance, decreased endurance, gait deviations, and decreased functional mobility  PT to continue to recommend STR  PT to continue to follow and treat as appropriate  Exercises   Hip Flexion Sitting;10 reps;AROM; Bilateral   Hip Adduction Sitting;10 reps;AROM; Bilateral   Knee AROM Long Arc Quad Sitting;10 reps;AROM; Bilateral   Ankle Pumps Sitting;20 reps;AROM; Bilateral   End of Consult   Patient Position at End of Consult Bedside chair;Bed/Chair alarm activated; All needs within reach  (RN aware)     PT Evaluation Time: 2804-6170    PT Treatment Time: 7528-6515  10 minutes    Genie Haskins PT, DPT

## 2023-05-16 NOTE — OCCUPATIONAL THERAPY NOTE
"  Occupational Therapy Evaluation      Jeremy Resendiz    5/16/2023    Patient Active Problem List   Diagnosis    Odontoid fracture with type II morphology (HCC)    Multiple abrasions    Nasal bones, closed fracture    Atelectasis    Pulmonary nodule    Renal cyst    Liver cyst    Delirium    Fall    Bladder cancer [facility])    CAD (coronary artery disease)    H/O heart artery stent    Umbilical hernia    Positive blood culture    Closed displaced fracture of left acetabulum [facility])    Aortic aneurysm (HCC)    Abnormal CT of the abdomen    Pre-operative clearance       Past Medical History:   Diagnosis Date    AMS (altered mental status) 4/20/2022    Bladder cancer ([address] )     CAD (coronary artery disease)     Dyslipidemia     History of heart artery stent     Umbilical hernia        Past Surgical History:   Procedure Laterality Date    BLADDER SURGERY      CORONARY ANGIOPLASTY WITH STENT PLACEMENT      TRANSODONTOID FUSION N/A 5/4/2020    Procedure: Transodontoid Fusion;  Surgeon: Maria D Arteaga MD;  [facility];  Service: Neurosurgery        05/16/23 0744   OT Last Visit   OT Visit Date 05/16/23   Note Type   Note type Evaluation   Additional Comments Pt agreeable to OT session  Upon arrival pt supine in bed c RN & ortho PA present   Pain Assessment   Pain Assessment Tool 0-10   Pain Score No Pain   Restrictions/Precautions   Weight Bearing Precautions Per Order Yes   LLE Weight Bearing Per Order PWB  (50% foot flat weight bearing per ortho)   Other Precautions Cognitive; Chair Alarm; Bed Alarm;WBS;Fall Risk;Hard of hearing   Home Living   Type of [address] [place] [address] Multi-level;Performs ADLs on one level; Able to live on main level with bedroom/bathroom  (1 RICHI)   Bathroom Shower/Tub Tub/shower unit   Bathroom Toilet Standard   Bathroom Equipment Grab bars in shower; Shower chair;Grab bars around toilet   [address] Walker;Cane  (\"uses RW 90% of the time\")   Prior " Function   Level of [place] Needs assistance with IADLS; Independent with ADLs; Independent with functional mobility   Lives With Family; Son   Miguelina Hamm Help From Family   IADLs Family/Friend/Other provides transportation; Family/Friend/Other provides meals; Family/Friend/Other provides medication management   Falls in the last 6 months 5 to 10   Vocational Retired   Comments Pt is a questionable historian d/t cognitive impairment  CM to follow-up  Lifestyle   Autonomy Patient reporting being independent with ADLs, ambulatory with RW, and lives with son   Reciprocal Relationships Son and DIL   Service to Others Retired   ADL   Eating Assistance 5  Supervision/Setup   Grooming Assistance 5  Supervision/Setup   [address] 5  Supervision/Setup   LB [address] 3  Moderate Assistance   [address] 4  [address] 3  Moderate Assistance   [address]  3  Moderate Assistance   Additional Comments ADL levels based on functional performane during OT eval   Bed Mobility   Supine to Sit 4  Minimal assistance   Additional items Assist x 2;Bedrails; Increased time required;Verbal cues;LE management   Sit to Supine   (DNT; pt seated OOB in recliner at end of session)   Additional Comments   (Pt denied lightheadedness/dizziness)   Transfers   Sit to Stand 4  Minimal assistance   Additional items Assist x 2; Increased time required;Verbal cues   Stand to Sit 4  Minimal assistance   Additional items Assist x 2; Increased time required;Verbal cues   Additional Comments Verbal cues provided for proper body mechanics to maintain WBS   Functional Mobility   Functional Mobility 4  Minimal assistance  (assist of 2)   Additional Comments Pt ambulated to recliner; required cues for RW management, safety, and WBS   Additional items Rolling walker   Balance   Static Sitting Fair +   Dynamic Sitting Fair   Static Standing Poor +   Dynamic Standing Poor   Activity Tolerance Activity Tolerance Patient limited by pain   Medical Staff Made Aware Pt seen as a co-eval with PT Geovanna Vázquez due to the patient's co-morbidities, clinically unstable presentation, and present impairments which are a regression from the patient's baseline  RUE Assessment   RUE Assessment WFL  (Grossly 3+/5 based on functional assessment)   LUE Assessment   LUE Assessment WFL  (Grossly 3+/5 based on functional assessment)   Hand Function   Gross Motor Coordination Functional   Fine Motor Coordination Functional   Sensation   Light Touch No apparent deficits  (BUE)   Vision-Basic Assessment   Current Vision Wears glasses only for reading   Vision - Complex Assessment   Acuity Able to read clock/calendar on wall without difficulty   Cognition   Overall Cognitive Status Impaired   Arousal/Participation Alert; Responsive; Cooperative   Attention Attends with cues to redirect   Orientation Level Oriented to person;Oriented to place; Disoriented to time;Disoriented to situation   Memory Decreased short term memory;Decreased recall of recent events;Decreased recall of precautions  (pt does not recall having surgery yesterday)   Following Commands Follows one step commands with increased time or repetition   Assessment   Limitation Decreased ADL status; Decreased UE strength;Decreased Safe judgement during ADL;Decreased cognition;Decreased endurance;Decreased self-care trans;Decreased high-level ADLs   Prognosis Good   Assessment Patient is a 80 y o  male seen for OT evaluation s/p admit to [address] [place] [address]  on 5/13/2023 w/Closed displaced fracture of left acetabulum ([address] )  Commorbidities affecting patient's functional performance at time of assessment include: CAD, aortic aneurysm, and fall  Orders placed for OT evaluation and treatment  Performed at least two patient identifiers during session including name and wristband    Prior to admission, Patient reporting being independent with ADLs, ambulatory with RW, and lives with son  Personal factors affecting patient at time of initial evaluation include: steps to enter, difficulty performing ADLs and difficulty performing IADLs  Upon evaluation, patient requires supervision and minimal  assist for UB ADLs, moderate assist for LB ADLs, transfers and functional ambulation in room and bathroom with minimal  assist of 2, with the use of Rolling Walker  Patient is oriented to name,, oriented to place,, disoriented to time, and disoriented to situation, and presents with limited ability to recall information after a brief period of time (short term memory) / working memory   Occupational performance is affected by the following deficits: orientation, decreased muscle strength, decreased activity tolerance, impaired memory, impaired problem solving, decreased safety awareness, orthopedic restrictions and delayed righting and equilibrium reactions  Patient to benefit from continued Occupational Therapy treatment while in the hospital to address deficits as defined above and maximize level of functional independence with ADLs and functional mobility  Occupational Performance areas to address include: grooming , bathing/ shower, dressing, toilet hygiene, transfer to all surfaces and functional ambulation  From OT standpoint, recommendation at time of d/c would be Post acute rehabilitation services  Goals   Patient Goals to get better   Plan   Treatment Interventions ADL retraining;Functional transfer training; Endurance training;UE strengthening/ROM; Cognitive reorientation;Patient/family training;Equipment evaluation/education; Compensatory technique education; Activityengagement   Goal Expiration Date 05/31/23   OT Treatment Day 0   OT Frequency 3-5x/wk   Recommendation   OT Discharge Recommendation Post acute rehabilitation services   Additional Comments  The patient's raw score on the AM-PAC Daily Activity Inpatient Short Form is 15   A raw score of less than 19 suggests the patient may benefit from discharge to post-acute rehabilitation services  Please refer to the recommendation of the Occupational Therapist for safe discharge planning  AM-PAC Daily Activity Inpatient   Lower Body Dressing 2   Bathing 2   Toileting 2   Upper Body Dressing 3   Grooming 3   Eating 3   Daily Activity Raw Score 15   Daily Activity Standardized Score (Calc for Raw Score >=11) 34 69   AM-PAC Applied Cognition Inpatient   Following a Speech/Presentation 2   Understanding Ordinary Conversation 3   Taking Medications 1   Remembering Where Things Are Placed or Put Away 1   Remembering List of 4-5 Errands 1   Taking Care of Complicated Tasks 1   Applied Cognition Raw Score 9   Applied Cognition Standardized Score 22 48   Modified Reji Scale   Modified Reji Scale 4   End of Consult   Patient Position at End of Consult Bedside chair; Other (comment)  (PT Eduardo Shabazz present)   Nurse Communication Nurse aware of consult     GOALS:    *ADL transfers with CGA for inc'd independence with ADLs/purposeful tasks    *UB ADL with (S) for inc'd independence with self cares    *LB ADL with CGA using AE prn for inc'd independence with self cares    *Toileting with CGA for clothing management and hygiene for return to PLOF with personal care    *Increase stand tolerance x 4  m for inc'd tolerance with standing purposeful tasks    *Participate in 10m UE therex to increase overall stamina/activity tolerance for purposeful tasks    *Bed mobility- (S) for inc'd independence to manage own comfort and initiate EOB & OOB purposeful tasks    *Patient will verbalize 3 safety awareness/ principles to prevent falls in the home setting  *Patient will increase OOB/sitting tolerance to 2-4 hours per day to increase participation in self-care and leisure tasks with no s/s of exertion  *Patient will engage in ongoing cognitive assessment to assist with safe discharge planning/recommendations       *Pt will verbalize and demonstrate understanding of post-op movement precautions 100% in tx sessions for increased safety and functional mobility         Ashlyn Hi, RICKY, OTR/L

## 2023-05-16 NOTE — PROGRESS NOTES
[address] [place] [address]  Progress Note  Name: Mena Denver  MRN: [id number]  Unit/Bed#: -01 I Date of Admission: 2023   Date of Service: 2023 I Hospital Day: 3    Assessment/Plan   * Closed displaced fracture of left acetabulum [facility])  Assessment & Plan  Presents with L hip pain   · CT LE: Acute , mildly displaced, comminuted fracture of the LEFT acetabulum involving the anterior and posterior columns  · Orthopedic surgery following and plan for procedure today  · Given hx of dementia will keep on strict bed rest overnight, NWB LLE    · PT evaluation pending, follow up to determine dispo                VTE Pharmacologic Prophylaxis: VTE Score: 8 High Risk (Score >/= 5) - Pharmacological DVT Prophylaxis Ordered: enoxaparin (Lovenox)  Sequential Compression Devices Ordered  Patient Centered Rounds: I performed bedside rounds with nursing staff today  Discussions with Specialists or Other Care Team Provider: gibran    Education and Discussions with Family / Patient: Patient declined call to   Total Time Spent on Date of Encounter in care of patient: 35 minutes This time was spent on one or more of the following: performing physical exam; counseling and coordination of care; obtaining or reviewing history; documenting in the medical record; reviewing/ordering tests, medications or procedures; communicating with other healthcare professionals and discussing with patient's family/caregivers  Current Length of Stay: 3 day(s)  Current Patient Status: Inpatient   Certification Statement: The patient will continue to require additional inpatient hospital stay due to PT evaluation for dispo planning  Discharge Plan: Anticipate discharge in 24-48 hrs to discharge location to be determined pending rehab evaluations      Code Status: Level 1 - Full Code    Subjective:   Patient feels well     Objective:     Vitals:   Temp (24hrs), Av 5 °F (36 4 °C), Min:97 5 °F (36 4 °C), Max:97 5 °F (36 4 °C)    Temp:  [97 5 °F (36 4 °C)] 97 5 °F (36 4 °C)  HR:  [57-86] 71  Resp:  [12-18] 18  BP: (112-235)/() 123/52  SpO2:  [94 %-99 %] 98 %  Body mass index is 19 36 kg/m²  Input and Output Summary (last 24 hours): Intake/Output Summary (Last 24 hours) at 5/16/2023 1126  Last data filed at 5/16/2023 0900  Gross per 24 hour   Intake 1120 ml   Output 525 ml   Net 595 ml       Physical Exam:   Physical Exam  Constitutional:       General: He is not in acute distress  Appearance: Normal appearance  He is not toxic-appearing  Cardiovascular:      Rate and Rhythm: Normal rate and regular rhythm  Heart sounds: Normal heart sounds  No murmur heard  Pulmonary:      Effort: Pulmonary effort is normal  No respiratory distress  Breath sounds: Normal breath sounds  No wheezing  Abdominal:      General: Abdomen is flat  There is no distension  Palpations: Abdomen is soft  Tenderness: There is no abdominal tenderness  Neurological:      General: No focal deficit present  Mental Status: He is alert  Mental status is at baseline  Motor: No weakness  Additional Data:     Labs:  Results from last 7 days   Lab Units 05/16/23  0420 05/14/23 0457 05/13/23  1641   WBC Thousand/uL 12 66*   < > 9 47   HEMOGLOBIN g/dL 11 2*   < > 13 0   HEMATOCRIT % 33 7*   < > 38 8   PLATELETS Thousands/uL 174   < > 180   NEUTROS PCT %  --   --  85*   LYMPHS PCT %  --   --  8*   MONOS PCT %  --   --  7   EOS PCT %  --   --  0    < > = values in this interval not displayed       Results from last 7 days   Lab Units 05/16/23  0420 05/15/23  0524 05/14/23  0457   SODIUM mmol/L 136   < > 137   POTASSIUM mmol/L 4 4   < > 3 8   CHLORIDE mmol/L 110*   < > 110*   CO2 mmol/L 20*   < > 23   BUN mg/dL 27*   < > 29*   CREATININE mg/dL 1 07   < > 1 01   ANION GAP mmol/L 6   < > 4   CALCIUM mg/dL 9 9   < > 9 9   ALBUMIN g/dL  --   --  3 4*   TOTAL BILIRUBIN mg/dL  --   --  0 94   ALK PHOS U/L  --   --  90   ALT U/L  --   --  13   AST U/L  --   --  17   GLUCOSE RANDOM mg/dL 142*   < > 89    < > = values in this interval not displayed  Lines/Drains:  Invasive Devices     Peripheral Intravenous Line  Duration           Peripheral IV 05/14/23 Left;Ventral (anterior) Forearm 2 days                      Imaging: No pertinent imaging reviewed  Recent Cultures (last 7 days):         Last 24 Hours Medication List:   Current Facility-Administered Medications   Medication Dose Route Frequency Provider Last Rate   • acetaminophen  975 mg Oral Q8H [address] Vern Kumar PA-C     • aspirin  81 mg Oral Daily Peter Barnett PA-C     • enoxaparin  40 mg Subcutaneous Q24H [address] Vern Kumar PA-C     • lidocaine  1 patch Topical Daily PRN Peter Barnett PA-C     • naloxone  0 04 mg Intravenous Q1MIN PRN Peter Barnett PATraceC     • niacin  500 mg Oral Daily With Porfirio Kumar PA-C     • nicotine  1 patch Transdermal Daily Peter Barnett PA-C     • oxyCODONE  2 5 mg Oral Q6H PRN Peter Barnett PA-C     • polyethylene glycol  17 g Oral Daily PRN KATY GarciaC          Today, Patient Was Seen By: Niraj Hensley MD    **Please Note: This note may have been constructed using a voice recognition system  **

## 2023-05-16 NOTE — PLAN OF CARE
Problem: OCCUPATIONAL THERAPY ADULT  Goal: Performs self-care activities at highest level of function for planned discharge setting  See evaluation for individualized goals  Description: Treatment Interventions: ADL retraining, Functional transfer training, Endurance training, UE strengthening/ROM, Cognitive reorientation, Patient/family training, Equipment evaluation/education, Compensatory technique education, Activityengagement          See flowsheet documentation for full assessment, interventions and recommendations  Note: Limitation: Decreased ADL status, Decreased UE strength, Decreased Safe judgement during ADL, Decreased cognition, Decreased endurance, Decreased self-care trans, Decreased high-level ADLs  Prognosis: Good  Assessment: Patient is a 80 y o  male seen for OT evaluation s/p admit to [id number] [facility]  on 5/13/2023 w/Closed displaced fracture of left acetabulum ([address] )  Commorbidities affecting patient's functional performance at time of assessment include: CAD, aortic aneurysm, and fall  Orders placed for OT evaluation and treatment  Performed at least two patient identifiers during session including name and wristband  Prior to admission, Patient reporting being independent with ADLs, ambulatory with RW, and lives with son  Personal factors affecting patient at time of initial evaluation include: steps to enter, difficulty performing ADLs and difficulty performing IADLs  Upon evaluation, patient requires supervision and minimal  assist for UB ADLs, moderate assist for LB ADLs, transfers and functional ambulation in room and bathroom with minimal  assist of 2, with the use of Rolling Walker  Patient is oriented to name,, oriented to place,, disoriented to time, and disoriented to situation, and presents with limited ability to recall information after a brief period of time (short term memory) / working memory     Occupational performance is affected by the following deficits: orientation, decreased muscle strength, decreased activity tolerance, impaired memory, impaired problem solving, decreased safety awareness, orthopedic restrictions and delayed righting and equilibrium reactions  Patient to benefit from continued Occupational Therapy treatment while in the hospital to address deficits as defined above and maximize level of functional independence with ADLs and functional mobility  Occupational Performance areas to address include: grooming , bathing/ shower, dressing, toilet hygiene, transfer to all surfaces and functional ambulation  From OT standpoint, recommendation at time of d/c would be Post acute rehabilitation services       OT Discharge Recommendation: Post acute rehabilitation services        [facility] OTD, OTR/L

## 2023-05-16 NOTE — PROGRESS NOTES
Progress Note - Orthopedics   Terry Garcia 80 y o  male MRN: [id number]  Unit/Bed#: -01      Subjective:    80 y  o male POD#1 percutaneous fixation left acetabulum fracture  Patient states that his hip is doing well overall  Patient states he has minimal pain in the hip currently  Patient is slightly more disoriented in comparison to previous examination yesterday  Patient states that his main concern is itchiness over the posterior aspect of the hip  Patient denies any fevers any chills  Patient denies any shortness of breath chest tightness chest pain    Patient offers no other complaints at this time    Labs:  0   Lab Value Date/Time    HCT 33 7 (L) 05/16/2023 0420    HCT 34 7 (L) 05/15/2023 0524    HCT 33 2 (L) 05/14/2023 0457    HGB 11 2 (L) 05/16/2023 0420    HGB 11 6 (L) 05/15/2023 0524    HGB 11 2 (L) 05/14/2023 0457    INR 1 06 04/20/2022 1828    WBC 12 66 (H) 05/16/2023 0420    WBC 7 32 05/15/2023 0524    WBC 7 95 05/14/2023 0457       Meds:    Current Facility-Administered Medications:   •  acetaminophen (TYLENOL) tablet 975 mg, 975 mg, Oral, Q8H [address], Vern Kumar PA-C, 975 mg at 05/16/23 0243  •  aspirin chewable tablet 81 mg, 81 mg, Oral, Daily, Shaun Grady PA-C, 81 mg at 05/15/23 1517  •  enoxaparin (LOVENOX) subcutaneous injection 40 mg, 40 mg, Subcutaneous, Q24H [facility], Vern Kumar PA-C, 40 mg at 05/16/23 0243  •  lidocaine (LIDODERM) 5 % patch 1 patch, 1 patch, Topical, Daily PRN, Shaun Grady PA-C  •  naloxone (NARCAN) 0 04 mg/mL syringe 0 04 mg, 0 04 mg, Intravenous, Q1MIN PRN, Shaun Grady PA-C  •  niacin (NIASPAN) CR tablet 500 mg, 500 mg, Oral, Daily With Dinner, Juanis Kumar PA-C, 500 mg at 05/15/23 1852  •  nicotine (NICODERM CQ) 14 mg/24hr TD 24 hr patch 1 patch, 1 patch, Transdermal, Daily, Shaun Grady PA-C, 1 patch at 05/15/23 0828  •  oxyCODONE (ROXICODONE) split tablet 2 5 mg, 2 5 mg, Oral, Q6H PRN, Shaun Grady, MOHSEN, 2 5 mg at 05/14/23 0945  •  polyethylene glycol (MIRALAX) packet 17 g, 17 g, Oral, Daily PRN, Yessi Hughes PA-C      Blood Culture:   Lab Results   Component Value Date    BLOODCX No Growth After 5 Days  04/20/2022    BLOODCX Bacillus species NOT anthracis (A) 04/20/2022       Wound Culture:   No results found for: WOUNDCULT    Ins and Outs:  I/O last 24 hours: In: 1000 [I V :750; IV Piggyback:250]  Out: 119 [Urine:725; Blood:50]      Physical:  Vitals:    05/16/23 0759   BP: 129/63   Pulse: 73   Resp: 18   Temp: 97 5 °F (36 4 °C)   SpO2: 96%       Musculoskeletal: left Lower Extremity  · Patient resting comfortably in hospital bed in no acute  · Skin  : Mild ecchymosis noted over the posterior most incision site  No other acute visible abnormalities present in the lower extremities  · Dressing  : Dressing present on the most medial and posterior wound not present with mild bloody drainage present on bed and patient   · TTP  : Mild tenderness palpation noted over incision site  No other bony or soft tissue tenderness palpation noted at this time  · Sensation intact to saphenous, sural, tibial, superficial peroneal nerve, and deep peroneal  · Motor intact to +FHL/EHL, +ankle dorsi/plantar flexion  · 2+ DP pulse, symmetric bilaterally  · Digits warm and well perfused  · Capillary refill < 2 seconds      Assessment:    80 y  o male  POD#1 percutaneous fixation left acetabulum fracture  Patient doing well  Plan:  · Partial weight bearing left lower extremity   · Dressing reapplied at bedside  Patient tolerated well  Please reach out when dressings are removed by patient  · Will monitor for ABLA and administer IVF/prbc as indicated for Greater than 2 gram drop or Hgb < 7  · PT/OT for ambulation assistance  / gait training   · Pain control  Per primary team   · DVT ppx  :  Recommend 4 weeks of anticoagulation   · Dispo: Ortho will follow  See above for additional details             Michelle Kurtz "CAITLYN Kumar PA-C                Portions of the record may have been created with voice recognition software  Occasional wrong word or \"sound a like\" substitutions may have occurred due to the inherent limitations of voice recognition software  Read the chart carefully and recognize, using context, where substitutions have occurred        "

## 2023-05-16 NOTE — ASSESSMENT & PLAN NOTE
Presents with L hip pain   · CT LE: Acute , mildly displaced, comminuted fracture of the LEFT acetabulum involving the anterior and posterior columns     · Orthopedic surgery following and plan for procedure today  · Given hx of dementia will keep on strict bed rest overnight, NWB LLE    · PT evaluation pending, follow up to determine dispo

## 2023-05-16 NOTE — CASE MANAGEMENT
Case Management Discharge Planning Note    Patient name Fabio Kuhn  Location [address] [id number] MRN [id number]  : 1935 Date 2023       Current Admission Date: 2023  Current Admission Diagnosis:Closed displaced fracture of left acetabulum [facility])   Patient Active Problem List    Diagnosis Date Noted   • Pre-operative clearance 2023   • Closed displaced fracture of left acetabulum ([facility] ) 2023   • Aortic aneurysm ([facility] ) 2023   • Abnormal CT of the abdomen 2023   • Positive blood culture 2022   • Delirium 2020   • Fall 2020   • Bladder cancer ([facility] ) 2020   • CAD (coronary artery disease) 2020   • H/O heart artery stent    • Umbilical hernia    • Odontoid fracture with type II morphology ([facility] ) 2020   • Multiple abrasions 2020   • Nasal bones, closed fracture 2020   • Atelectasis 2020   • Pulmonary nodule 2020   • Renal cyst 2020   • Liver cyst 2020      LOS (days): 3  Geometric Mean LOS (GMLOS) (days): 2 70  Days to GMLOS:-0 1     OBJECTIVE:  Risk of Unplanned Readmission Score: 13 25      Current admission status: Inpatient   Preferred Pharmacy:   [address] [place] [address] [address] [facility] [address]  Phone: [phone] Fax: [phone]    Primary Care Provider: Amelia Sandoval PA-C    Primary Insurance: [facility]  Secondary Insurance:     DISCHARGE DETAILS:    Discharge planning discussed with[de-identified] Son - Ramo Jordan via phone  [place] of Choice: Yes  Comments - Freedom of Choice: FOC reviewed - CM reviewed PT/OT rec for STR placement  Son feels that if patient is able to ambulate with the walker to the bathroom, he's fine to return home  CM reviewed status of referral - [facility] will accept patient for placement however they want to confirm that patient wants to be there as last stay he was difficult to keep    Son acknowledges that the change in environment is hard for patient due to his dementia  He feels he does much better at home  CM contacted family/caregiver?: Yes  Were Treatment Team discharge recommendations reviewed with patient/caregiver?: Yes (As it pertains to d/c planning & CM role )  Did patient/caregiver verbalize understanding of patient care needs?: Yes (As it pertains to d/c planning & CM role )  Were patient/caregiver advised of the risks associated with not following Treatment Team discharge recommendations?: Yes (As it pertains to d/c planning & CM role )    Contacts  Patient Contacts: Lora Post (son)  Relationship to Patient[de-identified] Family  Contact Method: Phone  Phone Number: [phone]  Reason/Outcome: Continuity of Care, Discharge Planning      DME Referral Provided  Referral made for DME?: No    Other Referral/Resources/Interventions Provided:  Interventions: Short Term Rehab  Referral Comments: STR referral remains open in [facility] will accept if family/patient are agreeable to placement  Would you like to participate in our [address] service program?  : No - Declined    Treatment Team Recommendation: Short Term Rehab     CM contacted PT and requested contact with son, Lora Post, via phone as he has questions that are best answered by treating provider  CM will continue to follow  If patient d/c's to STR, he will need [facility] prior to d/c

## 2023-05-16 NOTE — PLAN OF CARE
Problem: PHYSICAL THERAPY ADULT  Goal: Performs mobility at highest level of function for planned discharge setting  See evaluation for individualized goals  Description: Treatment/Interventions: Functional transfer training, LE strengthening/ROM, Therapeutic exercise, Endurance training, Cognitive reorientation, Patient/family training, Bed mobility, Gait training, Spoke to nursing, Spoke to advanced practitioner, OT          See flowsheet documentation for full assessment, interventions and recommendations  Note: Prognosis: Good  Problem List: Decreased strength, Decreased endurance, Impaired balance, Decreased mobility, Decreased cognition, Orthopedic restrictions  Assessment: Pt is 80year old male seen for PT evaluation s/p admit to [facility] on 5/13/2023 with Closed displaced fracture of left acetabulum ([address] )  PT consulted to assess pt's functional mobility and discharge needs  Order placed for PT evaluation and treatment order  Comorbidities affecting pt's physical performance at time of assessment include CAD, aortic aneurysm, and abnormal CT of the abdomen  Prior to hospitalization, pt was independent with all functional mobility with a walker  Pt ambulates household distances  Pt resides with his son and family, in a multi-level house, but is able to stay on the first floor, with one step to enter  Personal factors affecting pt at time of initial evaluation include step to enter home, ambulating with an assistive device, inability to ambulate household distances, inability to navigate level surfaces without external assistance, unable to perform dynamic tasks in the community, inability to live alone, positive fall history, difficulty performing ADLs, and inability to perform IADLs   Please find objective findings from PT assessment regarding body systems outlined above with impairments and limitations including weakness, impaired balance, decreased endurance, gait deviations, decreased activity tolerance, decreased functional mobility tolerance, fall risk, orthopedic restrictions, and decreased cognition  The following objective measures were performed on initial evaluation Barthel Index: 45/100, Modified Reji: 4 (moderate/severe disability) and AM-PAC 6-Clicks: 91/10  Pt's clinical presentation is currently unstable/unpredictable seen in pt's presentation of need for ongoing medical management/monitoring, pt is a fall risk, pt has orthopedic restrictions limiting functional mobility, and pt requires cues and assist of two for safety with functional mobility  Pt to benefit from continued PT treatment to address deficits as defined above and maximize pt's level of function and independence with mobility  From a PT standpoint, recommendation at time of discharge would be STR pending pt's progress in order to facilitate return to prior level of function  Barriers to Discharge: Inaccessible home environment, Decreased caregiver support     PT Discharge Recommendation: Post acute rehabilitation services    See flowsheet documentation for full assessment

## 2023-05-17 RX ORDER — POLYETHYLENE GLYCOL 3350 17 G/17G
17 POWDER, FOR SOLUTION ORAL DAILY
Status: DISCONTINUED | OUTPATIENT
Start: 2023-05-17 | End: 2023-05-25 | Stop reason: HOSPADM

## 2023-05-17 RX ORDER — BISACODYL 5 MG/1
5 TABLET, DELAYED RELEASE ORAL DAILY PRN
Status: DISCONTINUED | OUTPATIENT
Start: 2023-05-17 | End: 2023-05-25 | Stop reason: HOSPADM

## 2023-05-17 RX ORDER — DOCUSATE SODIUM 100 MG/1
100 CAPSULE, LIQUID FILLED ORAL 2 TIMES DAILY
Status: DISCONTINUED | OUTPATIENT
Start: 2023-05-17 | End: 2023-05-25 | Stop reason: HOSPADM

## 2023-05-17 RX ORDER — SENNOSIDES 8.6 MG
1 TABLET ORAL
Status: DISCONTINUED | OUTPATIENT
Start: 2023-05-17 | End: 2023-05-25 | Stop reason: HOSPADM

## 2023-05-17 RX ADMIN — Medication 500 MG: at 18:46

## 2023-05-17 RX ADMIN — ENOXAPARIN SODIUM 40 MG: 40 INJECTION SUBCUTANEOUS at 02:26

## 2023-05-17 RX ADMIN — POLYETHYLENE GLYCOL 3350 17 G: 17 POWDER, FOR SOLUTION ORAL at 08:57

## 2023-05-17 RX ADMIN — ACETAMINOPHEN 975 MG: 325 TABLET, FILM COATED ORAL at 18:46

## 2023-05-17 RX ADMIN — ACETAMINOPHEN 975 MG: 325 TABLET, FILM COATED ORAL at 02:26

## 2023-05-17 RX ADMIN — DOCUSATE SODIUM 100 MG: 100 CAPSULE, LIQUID FILLED ORAL at 08:56

## 2023-05-17 RX ADMIN — DOCUSATE SODIUM 100 MG: 100 CAPSULE, LIQUID FILLED ORAL at 18:46

## 2023-05-17 RX ADMIN — Medication 2.5 MG: at 13:57

## 2023-05-17 RX ADMIN — ACETAMINOPHEN 975 MG: 325 TABLET, FILM COATED ORAL at 11:44

## 2023-05-17 RX ADMIN — ASPIRIN 81 MG: 81 TABLET, CHEWABLE ORAL at 08:56

## 2023-05-17 RX ADMIN — SENNOSIDES 8.6 MG: 8.6 TABLET, FILM COATED ORAL at 22:22

## 2023-05-17 RX ADMIN — NICOTINE 1 PATCH: 14 PATCH, EXTENDED RELEASE TRANSDERMAL at 08:56

## 2023-05-17 NOTE — PROGRESS NOTES
"Progress Note - Orthopedics   Rios Ramirez 80 y o  male MRN: [id number]  Unit/Bed#: [facility]      Subjective:    80 y  o male POD#2 percutaneous fixation left acetabulum fracture  Today, patient reports he is doing okay with some mild pain  Patient states, \"When are they going to do my surgery? \" Patient locates his discomfort to the Left hip  He lives at home with his son and daughter in law  Labs:  0   Lab Value Date/Time    HCT 33 7 (L) 05/16/2023 0420    HCT 34 7 (L) 05/15/2023 0524    HCT 33 2 (L) 05/14/2023 0457    HGB 11 2 (L) 05/16/2023 0420    HGB 11 6 (L) 05/15/2023 0524    HGB 11 2 (L) 05/14/2023 0457    INR 1 06 04/20/2022 1828    WBC 12 66 (H) 05/16/2023 0420    WBC 7 32 05/15/2023 0524    WBC 7 95 05/14/2023 0457       Meds:    Current Facility-Administered Medications:   •  acetaminophen (TYLENOL) tablet 975 mg, 975 mg, Oral, Q8H [address], Vern Kumar PA-C, 975 mg at 05/17/23 0226  •  aspirin chewable tablet 81 mg, 81 mg, Oral, Daily, Vern Kumar PA-C, 81 mg at 05/16/23 1024  •  enoxaparin (LOVENOX) subcutaneous injection 40 mg, 40 mg, Subcutaneous, Q24H [facility], Vern Kumar PA-C, 40 mg at 05/17/23 0226  •  lidocaine (LIDODERM) 5 % patch 1 patch, 1 patch, Topical, Daily PRN, Elia Jeans, PA-C  •  naloxone (NARCAN) 0 04 mg/mL syringe 0 04 mg, 0 04 mg, Intravenous, Q1MIN PRN, Elia Jeans, PA-C  •  niacin (NIASPAN) CR tablet 500 mg, 500 mg, Oral, Daily With Dinner, Elia Jeans, PA-C, 500 mg at 05/16/23 1735  •  nicotine (NICODERM CQ) 14 mg/24hr TD 24 hr patch 1 patch, 1 patch, Transdermal, Daily, Elia Jeans, PA-C, 1 patch at 05/16/23 1024  •  oxyCODONE (ROXICODONE) split tablet 2 5 mg, 2 5 mg, Oral, Q6H PRN, Elia Jeans, PA-C, 2 5 mg at 05/14/23 0945  •  polyethylene glycol (MIRALAX) packet 17 g, 17 g, Oral, Daily PRN, Elia Jeans, PA-C    Blood Culture:   Lab Results   Component Value Date    BLOODCX No Growth After 5 Days   " 04/20/2022    BLOODCX Bacillus species NOT anthracis (A) 04/20/2022       Wound Culture:   No results found for: WOUNDCULT    Ins and Outs:  I/O last 24 hours: In: 480 [P O :480]  Out: 400 [Urine:400]          Physical:  Vitals:    05/17/23 0714   BP: 147/74   Pulse: 75   Resp:    Temp: (!) 97 3 °F (36 3 °C)   SpO2: 96%     Musculoskeletal:   Left hip  · Dressing clean, dry and intact  · TTP at surgical incision  · Sensation intact to saphenous, sural, tibial, superficial peroneal nerve, and deep peroneal  · Motor intact to +FHL/EHL, +ankle dorsi/plantar flexion  · Unable to perform straight leg raise  · Active knee flexion to approximately 60-70 degrees  · 2+ DP pulse, symmetric bilaterally  · Digits warm and well perfused  · Calf soft, compressible and nontender  · Capillary refill < 2 seconds    Assessment:    80 y  o male POD#2 percutaneous fixation left acetabulum fracture    Plan:  · Toe touch weightbearing/50% flat foot weightbearing Left lower extremity with use of walker for safe ambulation  · HGB yesterday 11 2  · PT/OT- up and out of bed with above restrictions  · Pain control  · DVT ppx- Lovenox 40mg SQ daily  · Dispo: Overall, patient doing well s/p surgical intervention  Please see above for weightbearing recommendations   Once patient medically stable, he may follow up in office with Dr Madison Jamil in 2 weeks for reevaluation, new x-rays and consideration of staple removal      Lynn Valdes PA-C

## 2023-05-17 NOTE — DISCHARGE INSTR - AVS FIRST PAGE
Discharge Instructions - Orthopedics  Zenon Pitts 80 y o  male MRN: [id number]  Unit/Bed#: [facility]    Weight Bearing Status: Toe touch weightbearing/ 50% flat foot weightbearing Left lower extremity with use of walker for safety    DVT prophylaxis  Lovenox 40mg SQ once daily    Pain:  Continue analgesics as directed    Dressing Instructions:   Please keep clean, dry and intact 72 hours post operatively  Able to shower and allow warm, soapy water to run over incision and pat dry  Do not apply lotion or ointment to the surgical incision, no soaking, do not remove staples/suture  Appt Instructions: Follow up in office with Dr Brandt Griffiths in 2 weeks for reevaluation, consideration of staple removal, and new x-rays  Contact the office sooner if you experience any increased numbness/tingling in the extremities

## 2023-05-17 NOTE — OCCUPATIONAL THERAPY NOTE
Occupational Therapy Treatment Note     Patient Name: Claudio Gorman  [id number] Date: 5/17/2023  Problem List  Principal Problem:    Closed displaced fracture of left acetabulum [facility])  Active Problems:    CAD (coronary artery disease)    Aortic aneurysm (HCC)    Abnormal CT of the abdomen    Pre-operative clearance        05/17/23 1151   OT Last Visit   OT Visit Date 05/17/23   Note Type   Note Type Treatment   Pain Assessment   Pain Assessment Tool FLACC   Pain Rating: FLACC (Rest) - Face 0   Pain Rating: FLACC (Rest) - Legs 0   Pain Rating: FLACC (Rest) - Activity 0   Pain Rating: FLACC (Rest) - Cry 0   Pain Rating: FLACC (Rest) - Consolability 0   Score: FLACC (Rest) 0   Pain Rating: FLACC (Activity) - Face 1   Pain Rating: FLACC (Activity) - Legs 1   Pain Rating: FLACC (Activity) - Activity 0   Pain Rating: FLACC (Activity) - Cry 1   Pain Rating: FLACC (Activity) - Consolability 1   Score: FLACC (Activity) 4   Restrictions/Precautions   Weight Bearing Precautions Per Order Yes   LLE Weight Bearing Per Order PWB  (50% foot flat weight bearing per ortho)   Other Precautions Chair Alarm;Cognitive; Fall Risk;Pain;WBS   Lifestyle   Autonomy Patient reporting being independent with ADLs, ambulatory with RW, and lives with son   Reciprocal Relationships Son, DIL, granddtr, and great-granddtr present during session   Service to Others Retired   eInstruction by [employer] Mobility   Additional Comments DNT bed mobility: pt seated OOB in recliner upon arrival and at end of session   Transfers   Sit to Stand 4  Minimal assistance   Additional items Assist x 2;Armrests; Increased time required;Verbal cues   Stand to Sit 4  Minimal assistance   Additional items Assist x 2; Increased time required;Verbal cues;Armrests   Functional Mobility   Functional Mobility 4  Minimal assistance  (Assist of 2)   Additional Comments Pt ambulated short distance in room with no overt SOB  Pt requires max verbal cues to maintain PWB during ambulation   Pt unsteady and limited by pain  Additional items Rolling walker   Therapeutic Exercise - ROM   UE-ROM Yes  (to increase strength and endurance to improve independence with ADLs)   ROM- Right Upper Extremities   R Shoulder AROM; Flexion; Extension  (Protraction/Retraction)   R Elbow AROM;Elbow flexion;Elbow extension   R Position Seated;Against gravity   R Weight/Reps/Sets 1 set x 15 reps each   ROM - Left Upper Extremities    L Shoulder AROM; Flexion; Extension  (Protraction/Retraction)   L Elbow AROM;Elbow extension;Elbow flexion   L Position Seated;Against gravity   L Weight/Reps/Sets 1 set x 15 reps each   Cognition   Overall Cognitive Status Impaired   Arousal/Participation Alert; Responsive; Cooperative   Attention Attends with cues to redirect   Orientation Level Oriented to person;Disoriented to time;Disoriented to place; Disoriented to situation   Memory Decreased short term memory;Decreased recall of recent events;Decreased recall of precautions   Following Commands Follows one step commands with increased time or repetition   Comments Pt agreeable to OT session   Activity Tolerance   Activity Tolerance Patient limited by pain   Medical Staff Made Aware This session, pt required and most appropriately benefited from skilled OT/PT co-treat due to physical assistance of two therapists, significant regression from functional baseline and decreased activity tolerance  Assessment   Assessment Patient participated in Skilled OT session this date with interventions consisting of therapeutic exercise to: increase functional use of BUEs, increase BUE muscle strength ,  therapeutic activities to: increase activity tolerance and increase dynamic sit/ stand balance during functional activity   Patient agreeable to OT treatment session, upon arrival patient was found seated OOB to Recliner and in no apparent distress  Patient completed functional transfers with min assist of 2   Pt ambulated short distance in room with min assist of 2 utilizing RW and max cues to maintain PWB  Once seated in recliner, pt completed 1 set x 15 reps of BUE exercises to increase strength and endurance  In comparison to previous session, patient with improvements in endurance and balance  Patient requiring verbal cues for correct technique, cognitive assistance to anticipate next step and one step directives  Patient continues to be functioning below baseline level, occupational performance remains limited secondary to factors listed above and increased risk for falls and injury  From OT standpoint, recommendation at time of d/c would be Post acute rehabilitation services  Patient to benefit from continued Occupational Therapy treatment while in the hospital to address deficits as defined above and maximize level of functional independence with ADLs and functional mobility  Plan   Treatment Interventions ADL retraining;Functional transfer training;UE strengthening/ROM; Endurance training;Patient/family training; Compensatory technique education   Goal Expiration Date 05/31/23   OT Treatment Day 1   OT Frequency 3-5x/wk   Recommendation   OT Discharge Recommendation Post acute rehabilitation services   Additional Comments  The patient's raw score on the AM-PAC Daily Activity Inpatient Short Form is 15  A raw score of less than 19 suggests the patient may benefit from discharge to post-acute rehabilitation services  Please refer to the recommendation of the Occupational Therapist for safe discharge planning     AM-PAC Daily Activity Inpatient   Lower Body Dressing 2   Bathing 2   Toileting 2   Upper Body Dressing 3   Grooming 3   Eating 3   Daily Activity Raw Score 15   Daily Activity Standardized Score (Calc for Raw Score >=11) 34 69   AM-[facility] Applied Cognition Inpatient   Following a Speech/Presentation 2   Understanding Ordinary Conversation 3   Taking Medications 1   Remembering Where Things Are Placed or Put Away 1   Remembering List of 4-5 Errands 1   Taking Care of Complicated Tasks 1   Applied Cognition Raw Score 9   Applied Cognition Standardized Score 22 48   Modified Garden Scale   Modified Garden Scale 4   End of Consult   Patient Position at End of Consult Bedside chair;Bed/Chair alarm activated; All needs within reach   Nurse Communication Nurse aware of consult     Elisabeth Sneed OTD, OTR/L

## 2023-05-17 NOTE — PROGRESS NOTES
[address] [place] [address]  Progress Note  Name: Oscar Riley  MRN: [id number]  Unit/Bed#: -01 I Date of Admission: 5/13/2023   Date of Service: 5/17/2023 I Hospital Day: 4    Assessment/Plan   * Closed displaced fracture of left acetabulum [facility])  Assessment & Plan  Presents with L hip pain   · CT LE: Acute , mildly displaced, comminuted fracture of the LEFT acetabulum involving the anterior and posterior columns  · Orthopedic surgery following and plan for procedure today  · Given hx of dementia will keep on strict bed rest overnight, NWB LLE    · PT recommending short term rehab  · AMPAC score 6, requires 2 assist for ambulation  · Plan today for son to witness ambulation with PT assistance to determine if he feels comfortable taking patient home or placing in rehab  · If rehab decided, then needs insurance auth             VTE Pharmacologic Prophylaxis: VTE Score: 8 High Risk (Score >/= 5) - Pharmacological DVT Prophylaxis Ordered: enoxaparin (Lovenox)  Sequential Compression Devices Ordered  Patient Centered Rounds: I performed bedside rounds with nursing staff today  Discussions with Specialists or Other Care Team Provider: gibran    Education and Discussions with Family / Patient: Updated  (son) via phone  Total Time Spent on Date of Encounter in care of patient: 35 minutes This time was spent on one or more of the following: performing physical exam; counseling and coordination of care; obtaining or reviewing history; documenting in the medical record; reviewing/ordering tests, medications or procedures; communicating with other healthcare professionals and discussing with patient's family/caregivers      Current Length of Stay: 4 day(s)  Current Patient Status: Inpatient   Certification Statement: The patient will continue to require additional inpatient hospital stay due to dispo planning  Discharge Plan: Anticipate discharge in 24-48 hrs to tbd    Code Status: Level 1 - Full Code    Subjective:   Patient feels well, no overnight events     Objective:     Vitals:   Temp (24hrs), Av 4 °F (36 3 °C), Min:97 2 °F (36 2 °C), Max:97 9 °F (36 6 °C)    Temp:  [97 2 °F (36 2 °C)-97 9 °F (36 6 °C)] 97 3 °F (36 3 °C)  HR:  [63-75] 75  Resp:  [16-18] 16  BP: (123-155)/(52-74) 147/74  SpO2:  [95 %-98 %] 96 %  Body mass index is 19 87 kg/m²  Input and Output Summary (last 24 hours): Intake/Output Summary (Last 24 hours) at 2023 0941  Last data filed at 2023 0906  Gross per 24 hour   Intake 480 ml   Output 400 ml   Net 80 ml       Physical Exam:   Physical Exam  Constitutional:       General: He is not in acute distress  Appearance: Normal appearance  He is not toxic-appearing  Cardiovascular:      Rate and Rhythm: Normal rate and regular rhythm  Heart sounds: Normal heart sounds  No murmur heard  Pulmonary:      Effort: Pulmonary effort is normal  No respiratory distress  Breath sounds: Normal breath sounds  No wheezing  Abdominal:      General: Abdomen is flat  There is no distension  Palpations: Abdomen is soft  Tenderness: There is no abdominal tenderness  Neurological:      General: No focal deficit present  Mental Status: He is alert  Mental status is at baseline  Motor: No weakness  Additional Data:     Labs:  Results from last 7 days   Lab Units 23  04223  0457 23  1641   WBC Thousand/uL 12 66*   < > 9 47   HEMOGLOBIN g/dL 11 2*   < > 13 0   HEMATOCRIT % 33 7*   < > 38 8   PLATELETS Thousands/uL 174   < > 180   NEUTROS PCT %  --   --  85*   LYMPHS PCT %  --   --  8*   MONOS PCT %  --   --  7   EOS PCT %  --   --  0    < > = values in this interval not displayed       Results from last 7 days   Lab Units 23  0420 05/15/23  0524 23  0457   SODIUM mmol/L 136   < > 137   POTASSIUM mmol/L 4 4   < > 3 8   CHLORIDE mmol/L 110*   < > 110*   CO2 mmol/L 20*   < > 23   BUN mg/dL 27*   < > 29* CREATININE mg/dL 1 07   < > 1 01   ANION GAP mmol/L 6   < > 4   CALCIUM mg/dL 9 9   < > 9 9   ALBUMIN g/dL  --   --  3 4*   TOTAL BILIRUBIN mg/dL  --   --  0 94   ALK PHOS U/L  --   --  90   ALT U/L  --   --  13   AST U/L  --   --  17   GLUCOSE RANDOM mg/dL 142*   < > 89    < > = values in this interval not displayed  Lines/Drains:  Invasive Devices     Peripheral Intravenous Line  Duration           Peripheral IV 05/16/23 Left;Ventral (anterior) Forearm <1 day                      Imaging: No pertinent imaging reviewed  Recent Cultures (last 7 days):         Last 24 Hours Medication List:   Current Facility-Administered Medications   Medication Dose Route Frequency Provider Last Rate   • acetaminophen  975 mg Oral Q8H Julio Gutiérrez PA-C     • aspirin  81 mg Oral Daily Teresa Samaniego PA-C     • bisacodyl  5 mg Oral Daily PRN Mago Ponce MD     • docusate sodium  100 mg Oral BID Mago Ponce MD     • enoxaparin  40 mg Subcutaneous Q24H [address] Vern Kumar PA-C     • lidocaine  1 patch Topical Daily PRN Teresa Samaniego PA-C     • naloxone  0 04 mg Intravenous Q1MIN PRN Teresa Samaniego PA-C     • niacin  500 mg Oral Daily With Porfirio Kumar PA-C     • nicotine  1 patch Transdermal Daily Teresa Samaniego PA-C     • oxyCODONE  2 5 mg Oral Q6H PRN Teresa Samaniego PA-C     • polyethylene glycol  17 g Oral Daily PRN Teresa Samaniego PA-C     • polyethylene glycol  17 g Oral Daily Mago Ponce MD     • senna  1 tablet Oral HS Mago Ponce MD          Today, Patient Was Seen By: Rico Alan MD    **Please Note: This note may have been constructed using a voice recognition system  **

## 2023-05-17 NOTE — PLAN OF CARE
Problem: Potential for Falls  Goal: Patient will remain free of falls  Description: INTERVENTIONS:  - Educate patient/family on patient safety including physical limitations  - Instruct patient to call for assistance with activity   - Consult OT/PT to assist with strengthening/mobility   - Keep Call bell within reach  - Keep bed low and locked with side rails adjusted as appropriate  - Keep care items and personal belongings within reach  - Initiate and maintain comfort rounds  - Make Fall Risk Sign visible to staff  - Offer Toileting every 2 Hours, in advance of need  - Initiate/Maintain bedalarm  - Obtain necessary fall risk management equipment:   - Apply yellow socks and bracelet for high fall risk patients  - Consider moving patient to room near nurses station  Outcome: Progressing     Problem: MOBILITY - ADULT  Goal: Maintain or return to baseline ADL function  Description: INTERVENTIONS:  -  Assess patient's ability to carry out ADLs; assess patient's baseline for ADL function and identify physical deficits which impact ability to perform ADLs (bathing, care of mouth/teeth, toileting, grooming, dressing, etc )  - Assess/evaluate cause of self-care deficits   - Assess range of motion  - Assess patient's mobility; develop plan if impaired  - Assess patient's need for assistive devices and provide as appropriate  - Encourage maximum independence but intervene and supervise when necessary  - Involve family in performance of ADLs  - Assess for home care needs following discharge   - Consider OT consult to assist with ADL evaluation and planning for discharge  - Provide patient education as appropriate  Outcome: Progressing  Goal: Maintains/Returns to pre admission functional level  Description: INTERVENTIONS:  - Perform BMAT or MOVE assessment daily    - Set and communicate daily mobility goal to care team and patient/family/caregiver     - Collaborate with rehabilitation services on mobility goals if consulted  - Perform Range of Motion 3 times a day  - Reposition patient every 2 hours    - Dangle patient 3 times a day  - Stand patient 3 times a day  - Ambulate patient 3 times a day  - Out of bed to chair 3 times a day   - Out of bed for meals 3 times a day  - Out of bed for toileting  - Record patient progress and toleration of activity level   Outcome: Progressing     Problem: Prexisting or High Potential for Compromised Skin Integrity  Goal: Skin integrity is maintained or improved  Description: INTERVENTIONS:  - Identify patients at risk for skin breakdown  - Assess and monitor skin integrity  - Assess and monitor nutrition and hydration status  - Monitor labs   - Assess for incontinence   - Turn and reposition patient  - Assist with mobility/ambulation  - Relieve pressure over bony prominences  - Avoid friction and shearing  - Provide appropriate hygiene as needed including keeping skin clean and dry  - Evaluate need for skin moisturizer/barrier cream  - Collaborate with interdisciplinary team   - Patient/family teaching  - Consider wound care consult   Outcome: Progressing

## 2023-05-17 NOTE — PLAN OF CARE
Problem: PHYSICAL THERAPY ADULT  Goal: Performs mobility at highest level of function for planned discharge setting  See evaluation for individualized goals  Description: Treatment/Interventions: Functional transfer training, LE strengthening/ROM, Therapeutic exercise, Endurance training, Cognitive reorientation, Patient/family training, Bed mobility, Gait training, Spoke to nursing, Spoke to advanced practitioner, OT          See flowsheet documentation for full assessment, interventions and recommendations  Outcome: Progressing  Note: Prognosis: Good  Problem List: Decreased strength, Decreased range of motion, Decreased endurance, Impaired balance, Decreased mobility, Decreased cognition, Orthopedic restrictions, Pain  Assessment: Chart reviewed  Patient was received seated in recliner in NAD and agreeable to PT session  Today's PT treatment session consisted of therapeutic activity for facilitation of transitional movements and safe performance of correct technique for sit to stand transfers, therapeutic exercise to increase lower extremity muscle strength, and gait training to promote safe and functional ambulation on level surfaces  In comparison to the previous session the patient has made progress as evident by ability to ambulate a slightly increased distance  When ambulating pt requires max verbal and tactile cues to maintain partial weight bearing on right lower extremity  Pt's ambulation distance remains limited secondary to inconsistency with maintaining appropriate weight bearing restriction  Pt's family was at bedside and educated on the patient's weight bearing restriction, level of assistance that the patient requires, and recommendation for STR  Pt's family verbalized understanding  Pt tolerated therapeutic exercise well without complaints  Overall, patient tolerated today's session well  Pt with slow progress towards achieving his STG's secondary to cognitive impairments   Patient's prognosis for achieving their STG's is good  PT intervention continues to be appropriate as the patient continues to be limited by pain, decreased left hip ROM, decreased lower extremity strength, impaired balance, decreased endurance, gait deviations, and decreased functional mobility  Continue to recommend STR  PT to continue to see patient in order to address the deficits listed above and provide interventions consistent with the POC in order to achieve STG's and optimize the patient's independence with functional mobility  Barriers to Discharge: Inaccessible home environment, Decreased caregiver support     PT Discharge Recommendation: Post acute rehabilitation services    See flowsheet documentation for full assessment

## 2023-05-17 NOTE — PHYSICAL THERAPY NOTE
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]     05/17/23 1124   PT Last Visit   PT Visit Date 05/17/23   Note Type   Note Type Treatment   Pain Assessment   Pain Assessment Tool FLACC   Pain Location/Orientation Orientation: Left; Location: Hip   Hospital Pain Intervention(s) Repositioned; Ambulation/increased activity  (RN aware)   Pain Rating: FLACC (Rest) - Face 0   Pain Rating: FLACC (Rest) - Legs 0   Pain Rating: FLACC (Rest) - Activity 0   Pain Rating: FLACC (Rest) - Cry 0   Pain Rating: FLACC (Rest) - Consolability 0   Score: FLACC (Rest) 0   Pain Rating: FLACC (Activity) - Face 1   Pain Rating: FLACC (Activity) - Legs 0   Pain Rating: FLACC (Activity) - Activity 1   Pain Rating: FLACC (Activity) - Cry 1   Pain Rating: FLACC (Activity) - Consolability 1   Score: FLACC (Activity) 4   Restrictions/Precautions   Weight Bearing Precautions Per Order Yes   LLE Weight Bearing Per Order PWB  (50% flat foot weightbearing per ortho)   Other Precautions Cognitive; Chair Alarm; Bed Alarm;WBS;Fall Risk;Pain   General   Chart Reviewed Yes   Response to Previous Treatment Patient unable to report, no changes reported from family or staff   Family/Caregiver Present Yes  (pt's family at bedside)   Cognition   Overall Cognitive Status Impaired   Arousal/Participation Alert; Responsive; Cooperative   Attention Attends with cues to redirect   Orientation Level Oriented to person;Disoriented to place; Disoriented to time;Disoriented to situation   Memory Decreased recall of precautions;Decreased recall of recent events;Decreased short term memory   Following Commands Follows one step commands with increased time or repetition   Comments Pt agreeable to PT  Subjective   Subjective \"I'll walk  \"   Bed Mobility   Additional Comments Pt was received seated in recliner in NAD  " Transfers   Sit to Stand 4  Minimal assistance   Additional items Assist x 2;Armrests; Increased time required;Verbal cues   Stand to Sit 4  Minimal assistance   Additional items Assist x 2;Armrests; Increased time required;Verbal cues   Ambulation/Elevation   Gait pattern Decreased toe off;Decreased heel strike;Decreased hip extension; Excessively slow; Step to;Short stride;Decreased L stance; Antalgic   Gait Assistance 4  Minimal assist   Additional items Assist x 2;Verbal cues; Tactile cues   Assistive Device Rolling walker   Distance 8 feet   Ambulation/Elevation Additional Comments Pt requires max verbal and tactile cues to maintain PWB on left lower extremity; ambulation distance remains limited secondary to patient's inconsistent with maintaining appropriate weight bearing   Balance   Static Sitting Fair +   Dynamic Sitting Fair   Static Standing Poor +   Dynamic Standing Poor   Ambulatory Poor   Endurance Deficit   Endurance Deficit Yes   Endurance Deficit Description decreased activity tolerance   Activity Tolerance   Activity Tolerance Patient limited by fatigue;Patient limited by pain   Medical Staff Made Aware OT Fletcher Munguia: Discussed case with Dr Key Rothman Discussed case with UYEN Gibbs   Hip Flexion Sitting;10 reps;AROM; Bilateral  (limited ROM on L LE)   Knee AROM Long Arc Quad Sitting;10 reps;AROM; Bilateral   Ankle Pumps Sitting;15 reps;AROM; Bilateral   Assessment   Prognosis Good   Problem List Decreased strength;Decreased range of motion;Decreased endurance; Impaired balance;Decreased mobility; Decreased cognition;Orthopedic restrictions;Pain   Assessment Chart reviewed  Patient was received seated in recliner in NAD and agreeable to PT session   Today's PT treatment session consisted of therapeutic activity for facilitation of transitional movements and safe performance of correct technique for sit to stand transfers, therapeutic exercise to increase lower extremity muscle strength, and gait training to promote safe and functional ambulation on level surfaces  In comparison to the previous session the patient has made progress as evident by ability to ambulate a slightly increased distance  When ambulating pt requires max verbal and tactile cues to maintain partial weight bearing on right lower extremity  Pt's ambulation distance remains limited secondary to inconsistency with maintaining appropriate weight bearing restriction  Pt's family was at bedside and educated on the patient's weight bearing restriction, level of assistance that the patient requires, and recommendation for STR  Pt's family verbalized understanding  Pt tolerated therapeutic exercise well without complaints  Overall, patient tolerated today's session well  Pt with slow progress towards achieving his STG's secondary to cognitive impairments  Patient's prognosis for achieving their STG's is good  PT intervention continues to be appropriate as the patient continues to be limited by pain, decreased left hip ROM, decreased lower extremity strength, impaired balance, decreased endurance, gait deviations, and decreased functional mobility  Continue to recommend STR  PT to continue to see patient in order to address the deficits listed above and provide interventions consistent with the POC in order to achieve STG's and optimize the patient's independence with functional mobility  Barriers to Discharge Inaccessible home environment;Decreased caregiver support   Goals   STG Expiration Date 05/26/23   PT Treatment Day 2   Plan   Treatment/Interventions Functional transfer training;LE strengthening/ROM; Therapeutic exercise; Endurance training;Cognitive reorientation;Patient/family training;Bed mobility;Gait training;Spoke to nursing;Spoke to case management;Spoke to MD;OT;Family   Progress Slow progress, cognitive deficits   PT Frequency 4-6x/wk   Recommendation   PT Discharge Recommendation Post acute rehabilitation services AM-PAC Basic Mobility Inpatient   Turning in Flat Bed Without Bedrails 2   Lying on Back to Sitting on Edge of Flat Bed Without Bedrails 2   Moving Bed to Chair 2   Standing Up From Chair Using Arms 2   Walk in Room 1   Climb 3-5 Stairs With Railing 1   Basic Mobility Inpatient Raw Score 10   Turning Head Towards Sound 4   Follow Simple Instructions 3   Low Function Basic Mobility Raw Score  17   Low Function Basic Mobility Standardized Score  27 46   Highest Level Of Mobility   -HLM Goal 4: Move to chair/commode   -HLM Achieved 5: Stand (1 or more minutes)   Education   Education Provided Mobility training;Assistive device; Other  (PWB on L LE)   Patient Reinforcement needed   End of Consult   Patient Position at End of Consult Bedside chair;Bed/Chair alarm activated; All needs within reach  (RN aware; family at bedside)     Meghana Gary, PT, DPT    Time of PT treatment session: 1515-5240  28 minutes

## 2023-05-17 NOTE — PLAN OF CARE
Problem: OCCUPATIONAL THERAPY ADULT  Goal: Performs self-care activities at highest level of function for planned discharge setting  See evaluation for individualized goals  Description: Treatment Interventions: ADL retraining, Functional transfer training, UE strengthening/ROM, Endurance training, Patient/family training, Compensatory technique education          See flowsheet documentation for full assessment, interventions and recommendations  Outcome: Progressing  Note: Limitation: Decreased ADL status, Decreased UE strength, Decreased Safe judgement during ADL, Decreased cognition, Decreased endurance, Decreased self-care trans, Decreased high-level ADLs  Prognosis: Good  Assessment: Patient participated in Skilled OT session this date with interventions consisting of therapeutic exercise to: increase functional use of BUEs, increase BUE muscle strength ,  therapeutic activities to: increase activity tolerance and increase dynamic sit/ stand balance during functional activity   Patient agreeable to OT treatment session, upon arrival patient was found seated OOB to Recliner and in no apparent distress  Patient completed functional transfers with min assist of 2  Pt ambulated short distance in room with min assist of 2 utilizing RW and max cues to maintain PWB  Once seated in recliner, pt completed 1 set x 15 reps of BUE exercises to increase strength and endurance  In comparison to previous session, patient with improvements in endurance and balance  Patient requiring verbal cues for correct technique, cognitive assistance to anticipate next step and one step directives  Patient continues to be functioning below baseline level, occupational performance remains limited secondary to factors listed above and increased risk for falls and injury  From OT standpoint, recommendation at time of d/c would be Post acute rehabilitation services     Patient to benefit from continued Occupational Therapy treatment while in the hospital to address deficits as defined above and maximize level of functional independence with ADLs and functional mobility       OT Discharge Recommendation: Post acute rehabilitation services     [facility] OTD, OTR/L

## 2023-05-17 NOTE — ASSESSMENT & PLAN NOTE
Presents with L hip pain   · CT LE: Acute , mildly displaced, comminuted fracture of the LEFT acetabulum involving the anterior and posterior columns     · Orthopedic surgery following and plan for procedure today  · Given hx of dementia will keep on strict bed rest overnight, NWB LLE    · PT recommending short term rehab  · AMPAC score 6, requires 2 assist for ambulation  · Plan today for son to witness ambulation with PT assistance to determine if he feels comfortable taking patient home or placing in rehab  · If rehab decided, then needs insurance auth

## 2023-05-17 NOTE — CASE MANAGEMENT
Case Management Discharge Planning Note    Patient name Jose Juan Cooper  Location [address] [id number] MRN [id number]  : 1935 Date 2023       Current Admission Date: 2023  Current Admission Diagnosis:Closed displaced fracture of left acetabulum [facility])   Patient Active Problem List    Diagnosis Date Noted   • Pre-operative clearance 2023   • Closed displaced fracture of left acetabulum ([facility] ) 2023   • Aortic aneurysm ([facility] ) 2023   • Abnormal CT of the abdomen 2023   • Positive blood culture 2022   • Delirium 2020   • Fall 2020   • Bladder cancer ([facility] ) 2020   • CAD (coronary artery disease) 2020   • H/O heart artery stent    • Umbilical hernia    • Odontoid fracture with type II morphology ([facility] ) 2020   • Multiple abrasions 2020   • Nasal bones, closed fracture 2020   • Atelectasis 2020   • Pulmonary nodule 2020   • Renal cyst 2020   • Liver cyst 2020      LOS (days): 4  Geometric Mean LOS (GMLOS) (days): 2 70  Days to GMLOS:-1 1     OBJECTIVE:  Risk of Unplanned Readmission Score: 14 02         Current admission status: Inpatient   Preferred Pharmacy:   [address] [place] [address] [id number] [place] P[place]al [id number]  Phone: [phone] Fax: [phone]    Primary Care Provider: Diomedes Colón PA-C    [facility] [id number]  [facility]  Secondary Insurance:     DISCHARGE DETAILS:  Patient with rehab rec for STR/ARC  [facility] is willing to accept for STR as long as patient is agreeable  ARC is preference  Referrals sent  Patient now also on a 1:1 for SI   CM updated providers with request to continue clinical review  CM will update providers once psych consult is completed  Family agreeable to [facility] placement  CM will continue to follow for d/c planning needs

## 2023-05-18 RX ADMIN — DOCUSATE SODIUM 100 MG: 100 CAPSULE, LIQUID FILLED ORAL at 08:56

## 2023-05-18 RX ADMIN — ACETAMINOPHEN 975 MG: 325 TABLET, FILM COATED ORAL at 03:50

## 2023-05-18 RX ADMIN — ASPIRIN 81 MG: 81 TABLET, CHEWABLE ORAL at 08:56

## 2023-05-18 RX ADMIN — ACETAMINOPHEN 975 MG: 325 TABLET, FILM COATED ORAL at 11:11

## 2023-05-18 RX ADMIN — ENOXAPARIN SODIUM 40 MG: 40 INJECTION SUBCUTANEOUS at 03:50

## 2023-05-18 RX ADMIN — SENNOSIDES 8.6 MG: 8.6 TABLET, FILM COATED ORAL at 22:48

## 2023-05-18 RX ADMIN — Medication 500 MG: at 18:16

## 2023-05-18 RX ADMIN — NICOTINE 1 PATCH: 14 PATCH, EXTENDED RELEASE TRANSDERMAL at 08:57

## 2023-05-18 RX ADMIN — Medication 2.5 MG: at 00:31

## 2023-05-18 RX ADMIN — POLYETHYLENE GLYCOL 3350 17 G: 17 POWDER, FOR SOLUTION ORAL at 08:56

## 2023-05-18 RX ADMIN — DOCUSATE SODIUM 100 MG: 100 CAPSULE, LIQUID FILLED ORAL at 18:16

## 2023-05-18 NOTE — PHYSICAL THERAPY NOTE
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]     05/18/23 0953   PT Last Visit   PT Visit Date 05/18/23   Note Type   Note Type Treatment   Pain Assessment   Pain Assessment Tool FLACC   Pain Location/Orientation Orientation: Left; Location: Hip   Pain Onset/Description Onset: Ongoing   Pain Rating: FLACC (Rest) - Face 0   Pain Rating: FLACC (Rest) - Legs 0   Pain Rating: FLACC (Rest) - Activity 0   Pain Rating: FLACC (Rest) - Cry 0   Pain Rating: FLACC (Rest) - Consolability 0   Score: FLACC (Rest) 0   Pain Rating: FLACC (Activity) - Face 1   Pain Rating: FLACC (Activity) - Legs 1   Pain Rating: FLACC (Activity) - Activity 1   Pain Rating: FLACC (Activity) - Cry 1   Pain Rating: FLACC (Activity) - Consolability 1   Score: FLACC (Activity) 5   Restrictions/Precautions   Weight Bearing Precautions Per Order Yes   LLE Weight Bearing Per Order PWB  (50% foot flat weight bearing per ortho)   Other Precautions Cognitive; Chair Alarm; Bed Alarm;WBS;Fall Risk;Pain   General   Chart Reviewed Yes   Response to Previous Treatment Patient unable to report, no changes reported from family or staff   Family/Caregiver Present No   Cognition   Overall Cognitive Status Impaired   Arousal/Participation Alert; Responsive; Cooperative   Attention Attends with cues to redirect   Orientation Level Oriented to person;Disoriented to place; Disoriented to time;Disoriented to situation   Memory Decreased recall of precautions;Decreased recall of recent events;Decreased short term memory   Following Commands Follows one step commands with increased time or repetition   Comments Pt agreeable to PT  Subjective   Subjective \"I have to get up  \"   Bed Mobility   Supine to Sit 4  Minimal assistance   Additional items Assist x 2;HOB elevated; Bedrails; Increased time required;Verbal cues;LE " management   Transfers   Sit to Stand 4  Minimal assistance   Additional items Assist x 2; Increased time required;Verbal cues   Stand to Sit 4  Minimal assistance   Additional items Assist x 2;Armrests; Increased time required;Verbal cues   Ambulation/Elevation   Gait pattern Decreased toe off;Decreased heel strike;Decreased hip extension; Excessively slow; Step to;Short stride; Shuffling;Decreased L stance; Antalgic   Gait Assistance 4  Minimal assist   Additional items Assist x 2;Verbal cues   Assistive Device Rolling walker   Distance 5 feet x 2 trials with a seated rest break between each trial   Ambulation/Elevation Additional Comments Pt requires max verbal and tactile cues to maintain PWB on left lower extremity  Pt demonstrated improved adherance to weight bearing restriction this session   Balance   Static Sitting Fair +   Dynamic Sitting Fair   Static Standing Poor +   Dynamic Standing Poor   Ambulatory Poor   Endurance Deficit   Endurance Deficit Yes   Endurance Deficit Description decreased activity tolerance   Activity Tolerance   Activity Tolerance Patient tolerated treatment well   Medical Staff Made Aware RICO Cardenas   Nurse Made Aware RN [facility]   Exercises   Knee AROM Long Arc Quad Sitting;10 reps;AROM; Bilateral   Ankle Pumps Sitting;20 reps;AROM; Bilateral   Balance training  sit to stand transfers: 2x with emphasis on maintaining PWB on left lower extremity   Assessment   Prognosis Good   Problem List Decreased strength;Decreased range of motion;Decreased endurance; Impaired balance;Decreased mobility; Decreased cognition;Orthopedic restrictions;Pain   Assessment Chart reviewed  Patient was received supine in bed in NAD and agreeable to PT session   Today's PT treatment session consisted of therapeutic activity for facilitation of transitional movements and safe performance of correct technique for bed mobility and sit to stand transfers, therapeutic exercise to increase lower extremity muscle strength, and gait training to promote safe and functional ambulation on level surfaces  In comparison to the previous session the patient continues to require assist of two for all functional mobility  Pt ambulated two gait trials this session with a seated rest break between each trial  When ambulating pt requires max verbal and tactile cues to maintain PWB on left lower extremity  Pt demonstrated improved adherence to weight bearing restriction compared to previous session  Pt tolerated therapeutic exercise well without complaints  Overall, patient tolerated today's session well and continues to be making progress towards achieving his STG's  Patient's prognosis for achieving their STG's is good  Co-treatment was performed with OT secondary to complex medical condition of pt  Pt requires assist of two to achieve and maintain transitional movements; requiring the need of skilled therapeutic intervention of two therapists to achieve the delivery of services  PT/OT goals were addressed separately  PT intervention continues to be appropriate as the patient continues to be limited by pain, decreased left hip ROM, decreased lower extremity strength, impaired balance, decreased endurance, gait deviations, and decreased functional mobility  Continue to recommend STR  PT to continue to see patient in order to address the deficits listed above and provide interventions consistent with the POC in order to achieve STG's and optimize the patient's independence with functional mobility  Barriers to Discharge Inaccessible home environment;Decreased caregiver support   Goals   STG Expiration Date 05/26/23   PT Treatment Day 3   Plan   Treatment/Interventions Functional transfer training;LE strengthening/ROM; Therapeutic exercise; Endurance training;Cognitive reorientation;Patient/family training;Bed mobility;Gait training;Spoke to nursing;OT   Progress Improving as expected   PT Frequency 4-6x/wk   Recommendation   PT Discharge Recommendation Post acute rehabilitation services   AM-PAC Basic Mobility Inpatient   Turning in Flat Bed Without Bedrails 2   Lying on Back to Sitting on Edge of Flat Bed Without Bedrails 2   Moving Bed to Chair 2   Standing Up From Chair Using Arms 2   Walk in Room 1   Climb 3-5 Stairs With Railing 1   Basic Mobility Inpatient Raw Score 10   Turning Head Towards Sound 4   Follow Simple Instructions 4   Low Function Basic Mobility Raw Score  18   Low Function Basic Mobility Standardized Score  29 25   Highest Level Of Mobility   JH-HLM Goal 4: Move to chair/commode   JH-HLM Achieved 5: Stand (1 or more minutes)   Education   Education Provided Mobility training;Home exercise program;Assistive device; Other  (PWB on L LE)   Patient Reinforcement needed   End of Consult   Patient Position at End of Consult Bedside chair;Bed/Chair alarm activated; All needs within reach     Reagan Kevin, PT, DPT    Time of PT treatment session: [id number]  24 minutes

## 2023-05-18 NOTE — OCCUPATIONAL THERAPY NOTE
Occupational Therapy Treatment Note     Patient Name: Charmaine Interiano  EKPCG'G Date: 5/18/2023  Problem List  Principal Problem:    Closed displaced fracture of left acetabulum [facility])  Active Problems:    CAD (coronary artery disease)    Aortic aneurysm (HCC)    Abnormal CT of the abdomen    Pre-operative clearance        05/18/23 0954   OT Last Visit   OT Visit Date 05/18/23   Note Type   Note Type Treatment   Pain Assessment   Pain Assessment Tool FLACC   Pain Location/Orientation Orientation: Left; Location: Hip   Pain Onset/Description Onset: Ongoing; Descriptor: Discomfort; Descriptor: Aching   Pain Rating: FLACC (Rest) - Face 0   Pain Rating: FLACC (Rest) - Legs 0   Pain Rating: FLACC (Rest) - Activity 0   Pain Rating: FLACC (Rest) - Cry 0   Pain Rating: FLACC (Rest) - Consolability 0   Score: FLACC (Rest) 0   Pain Rating: FLACC (Activity) - Face 1   Pain Rating: FLACC (Activity) - Legs 1   Pain Rating: FLACC (Activity) - Activity 1   Pain Rating: FLACC (Activity) - Cry 1   Pain Rating: FLACC (Activity) - Consolability 1   Score: FLACC (Activity) 5   Restrictions/Precautions   Weight Bearing Precautions Per Order Yes   LLE Weight Bearing Per Order PWB   Other Precautions Cognitive; Chair Alarm; Bed Alarm;WBS;Fall Risk;Pain   Lifestyle   Autonomy Patient reporting being independent with ADLs, ambulatory with RW, and lives with son   Reciprocal Relationships Son, DIL, granddtr, and great-granddtr   Service to Others Retired   Intrinsic Gratification Sitting outside with the nice weather   ADL   Grooming Assistance 5  Supervision/Setup   Grooming Deficit Setup;Verbal cueing;Supervision/safety; Increased time to complete   Grooming Comments Pt completed oral hygiene while seated in recliner  Pt requires step-by-step cues d/t cognitive impairment  Bed Mobility   Supine to Sit 4  Minimal assistance   Additional items Assist x 2;Bedrails; Increased time required;Verbal cues;LE management   Sit to Supine   (DNT: pt seated OOB in recliner at end of session)   Transfers   Sit to Stand 4  Minimal assistance   Additional items Assist x 2; Increased time required;Verbal cues   Stand to Sit 4  Minimal assistance   Additional items Assist x 2; Increased time required;Verbal cues   Additional Comments Verbal cues provided for proper body mechanics to maintain WBS   Functional Mobility   Functional Mobility 4  Minimal assistance  (Assist of 2)   Additional Comments Pt ambulated short distance bed>commode>recliner ith no overt SOB  Pt limited by pain and WBS   Additional items Rolling walker   Toilet Transfers   Toilet Transfer From [employer] Transfer Type To and from   Toilet Transfer to Standard bedside commode   Toilet Transfer Technique Ambulating   Toilet Transfers Minimal assistance  (Assist of 2)   Cognition   Overall Cognitive Status Impaired   Arousal/Participation Alert; Responsive; Cooperative   Attention Attends with cues to redirect   Orientation Level Oriented to person;Disoriented to place; Disoriented to time;Disoriented to situation   Memory Decreased recall of precautions;Decreased recall of recent events;Decreased short term memory   Following Commands Follows one step commands with increased time or repetition   Comments Pt agreeable to OT session   Activity Tolerance   Activity Tolerance Patient limited by pain; Patient limited by fatigue   Medical Staff Made Aware This session, pt required and most appropriately benefited from skilled OT/PT co-treat due to physical assistance of two therapists, significant regression from functional baseline and decreased activity tolerance  Assessment   Assessment Patient participated in Skilled OT session this date with interventions consisting of ADL re training with the use of correct body mechnaics,  therapeutic activities to: increase activity tolerance, increase dynamic sit/ stand balance during functional activity  and increase OOB/ sitting tolerance    Patient agreeable to OT treatment session, upon arrival patient was found supine in bed and in no apparent distress  Patient completed bed mobility and functional transfers with min assist of 2  Pt ambulated bed>commode>recliner with min assist of 2 utilizing RW  Pt completed BSC transfer with min assist of 2  Once seated in recliner, pt completed oral hygiene with supervision assist  In comparison to previous session, patient with improvements in endurance and maintaining PWB during functional tasks  Patient requiring verbal cues for correct technique, cognitive assistance to anticipate next step, one step directives and frequent rest periods  Patient continues to be functioning below baseline level, occupational performance remains limited secondary to factors listed above and increased risk for falls and injury  From OT standpoint, recommendation at time of d/c would be Post acute rehabilitation services  Patient to benefit from continued Occupational Therapy treatment while in the hospital to address deficits as defined above and maximize level of functional independence with ADLs and functional mobility  Plan   Treatment Interventions ADL retraining;Functional transfer training; Endurance training;Patient/family training   Goal Expiration Date 05/31/23   OT Treatment Day 2   OT Frequency 3-5x/wk   Recommendation   OT Discharge Recommendation Post acute rehabilitation services   Additional Comments  The patient's raw score on the AM-PAC Daily Activity Inpatient Short Form is 15  A raw score of less than 19 suggests the patient may benefit from discharge to post-acute rehabilitation services  Please refer to the recommendation of the Occupational Therapist for safe discharge planning     AM-PAC Daily Activity Inpatient   Lower Body Dressing 2   Bathing 2   Toileting 2   Upper Body Dressing 3   Grooming 3   Eating 3   Daily Activity Raw Score 15   Daily Activity Standardized Score (Calc for Raw Score >=11) 34 69   AM-PAC Applied Cognition Inpatient   Following a Speech/Presentation 2   Understanding Ordinary Conversation 3   Taking Medications 1   Remembering Where Things Are Placed or Put Away 1   Remembering List of 4-5 Errands 1   Taking Care of Complicated Tasks 1   Applied Cognition Raw Score 9   Applied Cognition Standardized Score 22 48   End of Consult   Patient Position at End of Consult Bedside chair;Bed/Chair alarm activated; All needs within reach   Nurse Communication Nurse aware of consult     Angela Patiño OTD, OTR/L

## 2023-05-18 NOTE — PROGRESS NOTES
[address] [place] [address]  Progress Note  Name: Emile Dupont  MRN: [id number]  Unit/Bed#: -01 I Date of Admission: 5/13/2023   Date of Service: 5/18/2023 I Hospital Day: 5    Assessment/Plan   * Closed displaced fracture of left acetabulum [facility])  Assessment & Plan  Presents with L hip pain   · CT LE: Acute , mildly displaced, comminuted fracture of the LEFT acetabulum involving the anterior and posterior columns  · Orthopedic surgery following and plan for procedure today  · Given hx of dementia will keep on strict bed rest overnight, NWB LLE    · PT recommending short term rehab  · AMPAC score 6, requires 2 assist for ambulation  · Family is agreeable to rehab but patient had reported to staff suicidal ideation, so now on observation  · Consult psych for further evaluation  · Does have dementia, and unlikely truly a threat to himself or others               VTE Pharmacologic Prophylaxis: VTE Score: 8 High Risk (Score >/= 5) - Pharmacological DVT Prophylaxis Ordered: enoxaparin (Lovenox)  Sequential Compression Devices Ordered  Patient Centered Rounds: I performed bedside rounds with nursing staff today  Discussions with Specialists or Other Care Team Provider: na        Total Time Spent on Date of Encounter in care of patient: 35 minutes This time was spent on one or more of the following: performing physical exam; counseling and coordination of care; obtaining or reviewing history; documenting in the medical record; reviewing/ordering tests, medications or procedures; communicating with other healthcare professionals and discussing with patient's family/caregivers  Current Length of Stay: 5 day(s)  Current Patient Status: Inpatient   Certification Statement: The patient will continue to require additional inpatient hospital stay due to psych consult  Discharge Plan: Anticipate discharge in 48-72 hrs to rehab facility      Code Status: Level 1 - Full Code    Subjective:   Patient feels well today     Objective:     Vitals:   Temp (24hrs), Av 6 °F (36 4 °C), Min:97 3 °F (36 3 °C), Max:98 °F (36 7 °C)    Temp:  [97 3 °F (36 3 °C)-98 °F (36 7 °C)] 97 5 °F (36 4 °C)  HR:  [67-77] 72  Resp:  [16-18] 16  BP: (137-148)/(55-75) 147/75  SpO2:  [95 %-97 %] 97 %  Body mass index is 19 42 kg/m²  Input and Output Summary (last 24 hours): Intake/Output Summary (Last 24 hours) at 2023 1133  Last data filed at 2023 1101  Gross per 24 hour   Intake 300 ml   Output 550 ml   Net -250 ml       Physical Exam:   Physical Exam  Constitutional:       General: He is not in acute distress  Appearance: Normal appearance  He is not toxic-appearing  Cardiovascular:      Rate and Rhythm: Normal rate and regular rhythm  Heart sounds: Normal heart sounds  No murmur heard  Pulmonary:      Effort: Pulmonary effort is normal  No respiratory distress  Breath sounds: Normal breath sounds  No wheezing  Abdominal:      General: Abdomen is flat  There is no distension  Palpations: Abdomen is soft  Tenderness: There is no abdominal tenderness  Neurological:      General: No focal deficit present  Mental Status: He is alert  Mental status is at baseline  Motor: No weakness  Additional Data:     Labs:  Results from last 7 days   Lab Units 237 23  1641   WBC Thousand/uL 12 66*   < > 9 47   HEMOGLOBIN g/dL 11 2*   < > 13 0   HEMATOCRIT % 33 7*   < > 38 8   PLATELETS Thousands/uL 174   < > 180   NEUTROS PCT %  --   --  85*   LYMPHS PCT %  --   --  8*   MONOS PCT %  --   --  7   EOS PCT %  --   --  0    < > = values in this interval not displayed       Results from last 7 days   Lab Units 05/16/23  0420 05/15/23  0523  0457   SODIUM mmol/L 136   < > 137   POTASSIUM mmol/L 4 4   < > 3 8   CHLORIDE mmol/L 110*   < > 110*   CO2 mmol/L 20*   < > 23   BUN mg/dL 27*   < > 29*   CREATININE mg/dL 1 07   < > 1 01   ANION GAP mmol/L 6 < > 4   CALCIUM mg/dL 9 9   < > 9 9   ALBUMIN g/dL  --   --  3 4*   TOTAL BILIRUBIN mg/dL  --   --  0 94   ALK PHOS U/L  --   --  90   ALT U/L  --   --  13   AST U/L  --   --  17   GLUCOSE RANDOM mg/dL 142*   < > 89    < > = values in this interval not displayed  Lines/Drains:  Invasive Devices     Peripheral Intravenous Line  Duration           Peripheral IV 05/16/23 Left;Ventral (anterior) Forearm 1 day                      Imaging: No pertinent imaging reviewed  Recent Cultures (last 7 days):         Last 24 Hours Medication List:   Current Facility-Administered Medications   Medication Dose Route Frequency Provider Last Rate   • acetaminophen  975 mg Oral Q8H Avani Harrell PA-C     • aspirin  81 mg Oral Daily Shawna Gibson PA-C     • bisacodyl  5 mg Oral Daily PRN Lani Grove MD     • docusate sodium  100 mg Oral BID Lani Grove MD     • enoxaparin  40 mg Subcutaneous Q24H [address] Vern Kumar PA-C     • lidocaine  1 patch Topical Daily PRN Shawna Gibson PA-C     • naloxone  0 04 mg Intravenous Q1MIN PRN Shawna Gibson PA-C     • niacin  500 mg Oral Daily With Porfirio Kumar PA-C     • nicotine  1 patch Transdermal Daily Shawna Gibson PA-C     • oxyCODONE  2 5 mg Oral Q6H PRN Shawna Gibson PA-C     • polyethylene glycol  17 g Oral Daily PRN Shawna Gibson PA-C     • polyethylene glycol  17 g Oral Daily Lani Grove MD     • senna  1 tablet Oral HS Lani Grove MD          Today, Patient Was Seen By: Alvaro Sommer MD    **Please Note: This note may have been constructed using a voice recognition system  **

## 2023-05-18 NOTE — CONSULTS
REQUIRED DOCUMENTATION:     1  This service was provided via Telemedicine  2  Provider located at [address] [place] [address]  3  TeleMed provider: Brittany Crystal MD   4  Identify all parties in room with patient during tele consult: A nurse was present with the patient for safety and support  5  Patient was then informed that this was a Telemedicine visit and that the exam was being conducted confidentially over secure lines  My office door was closed  The patient was notified the following individuals were present in the room Dr Aliya Silvestre DO and Dr Darryl Javier DO  Patient acknowledged consent and understanding of privacy and security of the Telemedicine visit, and gave us permission to have the assistant stay in the room in order to assist with the history and to conduct the exam   I informed the patient that I have reviewed their record in Epic and presented the opportunity for them to ask any questions regarding the visit today  The patient initially agreed to participate, but then became easily agitated and terminated the interview after only a few minutes had elapsed  Psychiatric Evaluation - Behavioral Health   Inell Adie 80 y o  male MRN: [id number]  Unit/Bed#: -01 Encounter: [id number]    Assessment   Principal Problem:    Closed displaced fracture of left acetabulum [facility])  Active Problems:    CAD (coronary artery disease)    Aortic aneurysm (HCC)    Abnormal CT of the abdomen    Pre-operative clearance    At this time diagnosis is impaired cognition, likely delirium superimposed upon underlying dementia    Plan    At this time, a full safety evaluation could not be conducted as the patient refused to participate in interview  He denied suicidal ideation, but was unwilling to answer other questions  At this time, Tali Archuleta demonstrates impaired cognition  The patient demonstrates irritability and agitation and it is likely that he is suffering from delirium superimposed upon dementia  "  Recommend one-to-one observation due to verbalized suicidal statements as well as safety concerns     At this time recommend that the patient's medical status be optimized so as to correct for potential delirium/behaviors  Psychiatry will attempt to follow up after the patient's medical condition stabilizes to determine if he requires inpatient psych at a later time  No medication changes at this time  Admission labs evaluated, see detailed labs below  Collaborate with collaterals for baseline assessment and disposition  Medical management by medical team   Safety checks and vitals per unit protocol  Risks, benefits and possible side effects of Medications:   Patient does not verbalize understanding at this time and will require further explanation  Counseling / Coordination of Care:     Patient's presentation on admission and proposed treatment plan discussed with treatment team   Diagnosis, medication changes and treatment plan reviewed with patient  Recent stressors discussed with patient  Events leading to admission reviewed with patient  Importance of medication and treatment compliance reviewed with patient  Chief Complaint: \"I'm fine\" , \"I'm at home\" , \"I've been feeling beat up\" , \"Everything's been worse since my wife \" , \"I don't know who you are\" , \"I don't have to say nothing to nobody\"    History of Present Illness     This is a comprehensive psychiatric evaluation for the patient Marta Shaffer is an 80year old male, , living with his son in [address] [place] [address]  Marina Shaffer has a reported past medical history that includes coronary artery disease, bladder cancer, and dementia  Marina Shaffer presented to the [facility] ED on 23 brought in by EMS with left hip pain following a fall that occurred 2 weeks prior to hospital presentation    He was assessed in the emergency department, found to have a closed displaced fracture of the left acetabulum and was " "subsequently admitted to the hospital for medical stabilization  During his hospital stay at [facility] he was seen and assessed by physical therapy as well as occupational therapy  Short-term rehab was recommended  As the patient's hospital stay has progressed, the patient has reported to staff suicidal ideation  Through reports and through discussion with nursing, it appears patient has made these statements out of frustration and in the setting of pain  Psych was consulted as a result of the suicidal statements  At the time of interview, Susan Peck is a poor historian  He is oriented to self  He is not oriented to situation, stating he is at home with his son  He is not oriented to time  He is not oriented to recent events and does not understand the events that brought him to the hospital     Today, at the time of interview, Susan Peck states that he feels \"fine\", but then adds that he has been feeling \"beat up\"  He reports that he is in pain and has been struggling emotionally since the passing of his wife  At the time of interview, he denied suicidal ideation, asking Hershall Edith would I think about that\"? At the time of the interview today, Susan Peck was noted to be guarded, suspicious, and irritable  He stated \"I do not know who you are\"  He was able to be redirected for a brief period of time, but quickly became agitated with the interview  Susan Peck is only willing to converse for a few minutes and then refused to speak further  He stated \"I do not have to say nothing to nobody\"  He disconnected the call  Nursing attempted to encourage the patient, however Susan Peck continued to refuse to speak with psychiatry  At this time, Susan Peck demonstrates impaired cognition  The patient demonstrates irritability and agitation and it is likely that he is suffering from delirium superimposed upon dementia  At this time, a full safety evaluation could not be conducted as the patient refused to participate in interview    At this " time we recommend that the patient's medical status be optimized so as to correct for potential delirium/behaviors  Psychiatry will attempt to follow up after the patient's medical condition stabilizes to determine if he requires inpatient psych at a later time  Stressors:  • Recent fall and recent hip fracture  • Underlying dementia  • Unfamiliar hospital environment    Psychiatric Review Of Systems:    Unable to obtain psychiatric review of systems as the patient became agitated and uncooperative  Historical Information     Past Psychiatric History:     Past psychiatric history is unknown and could not be obtained as the patient became agitated and uncooperative       Substance Abuse History:  Social History     Tobacco History     Smoking Status  Every Day Smoking Frequency  0 50 packs/day Smoking Tobacco Type  Cigarettes    Smokeless Tobacco Use  Never          Alcohol History     Alcohol Use Status  Yes          Drug Use     Drug Use Status  Never          Sexual Activity     Sexually Active  Not Currently          Activities of Daily Living    Not Asked                 I am unable to assess the patient for substance use within the past 12 months as they are unable or unwilling to answer    Family Psychiatric History:   Psychiatric Illness:  unknown, unable to obtain  Substance Abuse:  unknown, unable to obtain  Suicide Attempts:  unknown, unable to obtain    Social History:  Education: unable to obtain  Learning Disabilities: unable to obtain  Marital History:   Children: the patient has a son whom he lives with, further information could not be obtained  Living Arrangement: Lives with his son in [address] [place] [address]  Occupational History:   Functioning Relationships: good support system  Legal History: unknown, unable to obtain   History: unable to obtain    Traumatic History:   Abuse: Unable to obtain  Other Traumatic Events: Unable to obtain     Past Medical History:  History of Seizures: Unable to obtain  History of Head injury with loss of consciousness: Unable to obtain    Past Medical History:   Diagnosis Date   • AMS (altered mental status) 4/20/2022   • Bladder cancer ([address] )    • CAD (coronary artery disease)    • Dyslipidemia    • History of heart artery stent    • Umbilical hernia        Past Surgical History:   Procedure Laterality Date   • BLADDER SURGERY     • CORONARY ANGIOPLASTY WITH STENT PLACEMENT     • ORIF PELVIS Left 5/15/2023    Procedure: Percutaneous fixation L acetabulum fracture;  Surgeon: Ger Mueller MD;  Location: [facility];  Service: Orthopedics   • TRANSODONTOID FUSION N/A 5/4/2020    Procedure: Transodontoid Fusion;  Surgeon: Mariella Conteh MD;  Location: [facility];  Service: Neurosurgery       Medical Review Of Systems:  Review of systems not obtained due to patient factors      Meds/Allergies   all current active meds have been reviewed  Allergies   Allergen Reactions   • Iodine - Food Allergy      Other reaction(s): Unknown Reaction       Objective   Vital signs in last 24 hours:  Temp:  [97 3 °F (36 3 °C)-98 °F (36 7 °C)] 97 5 °F (36 4 °C)  HR:  [67-77] 72  Resp:  [16-18] 16  BP: (137-148)/(55-75) 147/75      Intake/Output Summary (Last 24 hours) at 5/18/2023 1339  Last data filed at 5/18/2023 1101  Gross per 24 hour   Intake 180 ml   Output 450 ml   Net -270 ml       Mental Status Evaluation:  Appearance:  age appropriate, marginal hygiene, looks stated age, thin & gaunt looking   Behavior:  agitated, angry, uncooperative   Speech:  fluent, clear, loud   Mood:  angry   Affect:  reactive   Language: unable to assess   Thought Process:  Goal directed and coherent, but illogical   Associations: intact associations   Thought Content:  some paranoia   Perceptual Disturbances: does not appear responding to internal stimuli, unwilling to answer when asked   Risk Potential: Suicidal ideation - denies on interview, has previously verbalized suicidal ideation to staff  Homicidal ideation - does not answer  Potential for aggression - Yes, due to agitation   Sensorium:  disoriented to place, time/date and situation   Memory:  recent memory impaired   Consciousness:  alert and awake   Attention/Concentration: poor concentration and poor attention span   Intellect: within normal limits   Fund of Knowledge: awareness of current events: no   Insight:  poor   Judgment: poor   Muscle Strength Muscle Tone: Unable to assess as this was a virtual interview     Gait/Station: Unable to assess as this was a virtual interview     Motor Activity: no abnormal movements     Laboratory Results: I have personally reviewed all pertinent laboratory/tests results    Most Recent Labs:   Lab Results   Component Value Date    WBC 12 66 (H) 05/16/2023    RBC 3 47 (L) 05/16/2023    HGB 11 2 (L) 05/16/2023    HCT 33 7 (L) 05/16/2023     05/16/2023    RDW 14 0 05/16/2023    NEUTROABS 7 95 (H) 05/13/2023    SODIUM 136 05/16/2023    K 4 4 05/16/2023     (H) 05/16/2023    CO2 20 (L) 05/16/2023    BUN 27 (H) 05/16/2023    CREATININE 1 07 05/16/2023    GLUC 142 (H) 05/16/2023    CALCIUM 9 9 05/16/2023    AST 17 05/14/2023    ALT 13 05/14/2023    ALKPHOS 90 05/14/2023    TP 6 7 05/14/2023    ALB 3 4 (L) 05/14/2023    TBILI 0 94 05/14/2023    [id number] 2 960 05/01/2020    HGBA1C 5 7 (H) 05/03/2020     05/03/2020       Imaging Studies: XR chest portable    Result Date: 5/15/2023  Narrative: CHEST INDICATION:   pre op clearance  COMPARISON: April 20, 2022 EXAM PERFORMED/VIEWS:  XR CHEST PORTABLE FINDINGS: Cardiomediastinal silhouette appears unremarkable  Bibasilar densities are seen Crowding of the lung markings  Osseous structures appear within normal limits for patient age       Impression: Bibasilar density seen which may be due to atelectasis or infiltrate Increased lung markings may be due to mild congestion or hypoinflation Workstation performed: [id number]     XR pelvis complete 3+ vw    Result Date: 5/15/2023  Narrative: C-ARM -pelvis INDICATION: Left acetabular fracture  Procedure guidance  COMPARISON: Left hip and pelvis radiographs 5/13/2023 TECHNIQUE: FLUOROSCOPY TIME:   158 seconds 11 FLUOROSCOPIC IMAGES FINDINGS: Fluoroscopic guidance provided for procedure guidance  Osseous and soft tissue detail limited by technique  Impression: Fluoroscopic guidance provided for procedure guidance  Please refer to the separate procedure notes for additional details  Workstation performed: [id number]     XR hip/pelv 2-3 vws left    Result Date: 5/14/2023  Narrative: LEFT HIP INDICATION:   hip pain  COMPARISON: 4/28/2020 VIEWS:  XR HIP/PELV 2-3 VWS LEFT  W PELVIS IF PERFORMED FINDINGS: Posterior left acetabular nondisplaced fracture; known Right inferior pubic ramus old trauma  Bilateral degenerative hip joints No lytic or blastic osseous lesion  Aortobiiliac vascular stent  Degenerative lower lumbar spine     Impression: Posterior acetabular fracture Workstation performed: [id number]     VAS lower limb venous duplex study, unilateral/limited    Result Date: 5/16/2023  Narrative:  [facility] REPORT CLINICAL: Indications: Patient presents with left lower extremity pain s/p ORIF  Operative History: Coronary angioplasty with stent EVAR Risk Factors The patient has history of CAD, AAA, bladder cancer and smoking (current) 0 5 ppd  He is currently receiving Lovenox  CONCLUSION:  Impression:  RIGHT LOWER LIMB LIMITED: Evaluation shows no evidence of thrombus in the common femoral vein  Doppler evaluation shows a normal response to augmentation maneuvers  LEFT LOWER LIMB: No evidence of acute or chronic deep vein thrombosis  No evidence of superficial thrombophlebitis noted  Doppler evaluation shows a normal response to augmentation maneuvers  Popliteal, posterior tibial and anterior tibial arterial Doppler waveform's are triphasic   Note: There is a well defined hypoechoic "non-vascularized cystic-type structure noted in the popliteal fossa  Technically difficult/limited study  Some segments may be poorly visualized on today's exam  Technical findings were given to Peetr Barnett PA-C via [employer] and posted to chart  SIGNATURE: Electronically Signed by: Jani Peterson MD, [address] on 2023-05-16 03:00:55 PM    CT abdomen pelvis with contrast    Addendum Date: 5/13/2023 Addendum:   ADDENDUM: There is a typographical error in the body and impression of the report  It should state there is a \"Acute , mildly displaced, comminuted fracture of the LEFT acetabulum involving the anterior and posterior columns  \"     Result Date: 5/13/2023  Narrative: CT ABDOMEN AND PELVIS WITH IV CONTRAST INDICATION:   Abdominal pain, acute, nonlocalized  abd/back/left hip pain  COMPARISON: CT of the chest, abdomen and pelvis from 4/28/2020  TECHNIQUE:  CT examination of the abdomen and pelvis was performed  Multiplanar 2D reformatted images were created from the source data  This examination, like all CT scans performed in the [facility], was performed utilizing techniques to minimize radiation dose exposure, including the use of iterative reconstruction and automated exposure control  Radiation dose length product (DLP) for this visit:  619 mGy-cm IV Contrast:  100 mL of iohexol (OMNIPAQUE) Enteric Contrast:  Enteric contrast was not administered  FINDINGS: Evaluation is limited by motion artifact  ABDOMEN LOWER CHEST: Patchy opacities at the lung bases, which may represent atelectasis versus pneumonia  No pleural effusions  LIVER/BILIARY TREE:  Normal liver morphology  There are multiple simple cysts in the liver, the largest measuring 8 cm in the right hepatic lobe  There are also subcentimeter hypodense lesions too small to accurately characterize with CT technique, however statistically likely additional cysts  No biliary ductal dilation  GALLBLADDER:  No calcified gallstones   No " pericholecystic inflammatory change  SPLEEN: Unremarkable  PANCREAS:  Unremarkable  Normal caliber main pancreatic duct  ADRENAL GLANDS:  Unremarkable  KIDNEYS/URETERS:  Symmetric renal enhancement  2 mm calculus in the right kidney  No ureteral calculi  No hydronephrosis  Several simple cysts in the kidneys  There are also several subcentimeter hypodense lesions in both kidneys, too small to accurately  characterize with CT technique, however statistically likely additional cysts  STOMACH AND BOWEL:  Evaluation of the gastrointestinal tract is somewhat limited by underdistention and lack of oral contrast  No bowel obstruction or convincing inflammation  APPENDIX: The appendix is not visualized, however there are no secondary findings of appendicitis  ABDOMINOPELVIC CAVITY:  No ascites or pneumoperitoneum  LYMPH NODES: No abdominal or pelvic lymphadenopathy  VESSELS: The patient is status post endovascular graft repair of an abdominal aortic aneurysm  The graft extends from the suprarenal abdominal aorta into the bilateral common iliac arteries  The graft is patent  The maximum sac diameter measures 4 9 cm in diameter, increased from 4 6 cm when remeasured in a similar manner on the April 2020 CT  Although evaluation for endoleak is limited without precontrast images, there are foci of increased density similar to the blood pool outside of the graft within  the aneurysm sac (for instance series 2 image 87 90), which suggest the presence of an endoleak  There is also a 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, increased from 2 0 cm in April 2020 and with partial thrombosis  The left common iliac artery is aneurysmal measuring 1 7 cm in diameter, overall similar to April 2020  Patent major branch vessels  Scattered atherosclerotic calcifications in the abdominal aorta and its major branches  PELVIS REPRODUCTIVE ORGANS: The prostate gland is not enlarged   URINARY BLADDER: There is perivesical fat stranding  ABDOMINAL WALL/INGUINAL REGIONS: There is a small right inguinal hernia containing several nonobstructed loops of small bowel  There is also a small fat-containing supraumbilical ventral abdominal wall hernia  OSSEOUS STRUCTURES: There is an acute, mildly displaced, comminuted fracture of the right acetabulum  The fracture line involves both the anterior and posterior columns  There are no other acute fractures  No hip dislocation  There is a chronic appearing moderate fracture deformity of the T12 vertebral body  Although the images are not available for direct comparison, a report for a CT of the chest, abdomen and pelvis performed at [facility] on 9/19/2021 mentions the presence of a T12 compression fracture  There is no retropulsion into the spinal canal  There is a chronic, healed fracture of the right inferior pubic ramus  No focal aggressive osseous lesions  Degenerative changes of the spine  Generalized osteopenia  Impression: Acute, mildly displaced, comminuted fracture of the right acetabulum involving the anterior and posterior columns  No other acute fractures  No hip dislocation  Perivesical fat stranding, suggestive of cystitis  Recommend correlation with urinalysis  Infrarenal abdominal aortic aneurysm status post EVAR with patent graft, however increased size of maximum aneurysm sac diameter now measuring 4 9 compared to 4 6 cm in April 2020 with findings suggestive of an endoleak  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 also mildly enlarged from 2 0 cm in April 2020  Patchy opacities at the lung bases, which may represent atelectasis versus pneumonia  Additional findings as above  The study was marked in [facility] for immediate notification   Workstation performed: [id number]     Echo complete w/ contrast if indicated    Result Date: 5/14/2023  Narrative: •  Left Ventricle: Left ventricular cavity size is normal  Wall thickness is normal  The left ventricular ejection fraction is 55%  Systolic function is normal  Wall motion is normal  Diastolic function is normal  •  Aortic Valve: There is mild to moderate regurgitation  •  Mitral Valve: There is mild annular calcification  •  Tricuspid Valve: Estimated RVSP 35 mm Hg  Code Status: Level 1 - Full Code  Advance Directive and Living Will: <no information>    Suicide/Homicide Risk Assessment:    The patient denied suicidal ideation, but was unwilling to answer further questions and a full safety assessment could not be completed  Mirela Martin MD 05/18/23  Psychiatry Resident, PGY-II    This note was completed in part utilizing Dragon dictation Software  Grammatical, translation, syntax errors, random word insertions, spelling mistakes, and incomplete sentences may be an occasional consequence of this system secondary to software limitations with voice recognition, ambient noise, and hardware issues  If you have any questions or concerns about the content, text, or information contained within the body of this dictation, please contact the provider for clarification

## 2023-05-18 NOTE — PLAN OF CARE
Problem: OCCUPATIONAL THERAPY ADULT  Goal: Performs self-care activities at highest level of function for planned discharge setting  See evaluation for individualized goals  Description: Treatment Interventions: ADL retraining, Functional transfer training, Endurance training, Patient/family training          See flowsheet documentation for full assessment, interventions and recommendations  Outcome: Progressing  Note: Limitation: Decreased ADL status, Decreased UE strength, Decreased Safe judgement during ADL, Decreased cognition, Decreased endurance, Decreased self-care trans, Decreased high-level ADLs  Prognosis: Good  Assessment: Patient participated in Skilled OT session this date with interventions consisting of ADL re training with the use of correct body mechnaics,  therapeutic activities to: increase activity tolerance, increase dynamic sit/ stand balance during functional activity  and increase OOB/ sitting tolerance   Patient agreeable to OT treatment session, upon arrival patient was found supine in bed and in no apparent distress  Patient completed bed mobility and functional transfers with min assist of 2  Pt ambulated bed>commode>recliner with min assist of 2 utilizing RW  Pt completed BSC transfer with min assist of 2  Once seated in recliner, pt completed oral hygiene with supervision assist  In comparison to previous session, patient with improvements in endurance and maintaining PWB during functional tasks  Patient requiring verbal cues for correct technique, cognitive assistance to anticipate next step, one step directives and frequent rest periods  Patient continues to be functioning below baseline level, occupational performance remains limited secondary to factors listed above and increased risk for falls and injury  From OT standpoint, recommendation at time of d/c would be Post acute rehabilitation services     Patient to benefit from continued Occupational Therapy treatment while in the hospital to address deficits as defined above and maximize level of functional independence with ADLs and functional mobility       OT Discharge Recommendation: Post acute rehabilitation services        [facility] OTD, OTR/L

## 2023-05-18 NOTE — ASSESSMENT & PLAN NOTE
Presents with L hip pain   · CT LE: Acute , mildly displaced, comminuted fracture of the LEFT acetabulum involving the anterior and posterior columns     · Orthopedic surgery following and plan for procedure today  · Given hx of dementia will keep on strict bed rest overnight, NWB LLE    · PT recommending short term rehab  · AMPAC score 6, requires 2 assist for ambulation  · Family is agreeable to rehab but patient had reported to staff suicidal ideation, so now on observation  · Consult psych for further evaluation  · Does have dementia, and unlikely truly a threat to himself or others

## 2023-05-18 NOTE — PLAN OF CARE
Problem: PHYSICAL THERAPY ADULT  Goal: Performs mobility at highest level of function for planned discharge setting  See evaluation for individualized goals  Description: Treatment/Interventions: Functional transfer training, LE strengthening/ROM, Therapeutic exercise, Endurance training, Cognitive reorientation, Patient/family training, Bed mobility, Gait training, Spoke to nursing, Spoke to advanced practitioner, OT          See flowsheet documentation for full assessment, interventions and recommendations  Outcome: Progressing  Note: Prognosis: Good  Problem List: Decreased strength, Decreased range of motion, Decreased endurance, Impaired balance, Decreased mobility, Decreased cognition, Orthopedic restrictions, Pain  Assessment: Chart reviewed  Patient was received supine in bed in NAD and agreeable to PT session  Today's PT treatment session consisted of therapeutic activity for facilitation of transitional movements and safe performance of correct technique for bed mobility and sit to stand transfers, therapeutic exercise to increase lower extremity muscle strength, and gait training to promote safe and functional ambulation on level surfaces  In comparison to the previous session the patient continues to require assist of two for all functional mobility  Pt ambulated two gait trials this session with a seated rest break between each trial  When ambulating pt requires max verbal and tactile cues to maintain PWB on left lower extremity  Pt demonstrated improved adherence to weight bearing restriction compared to previous session  Pt tolerated therapeutic exercise well without complaints  Overall, patient tolerated today's session well and continues to be making progress towards achieving his STG's  Patient's prognosis for achieving their STG's is good  Co-treatment was performed with OT secondary to complex medical condition of pt   Pt requires assist of two to achieve and maintain transitional movements; requiring the need of skilled therapeutic intervention of two therapists to achieve the delivery of services  PT/OT goals were addressed separately  PT intervention continues to be appropriate as the patient continues to be limited by pain, decreased left hip ROM, decreased lower extremity strength, impaired balance, decreased endurance, gait deviations, and decreased functional mobility  Continue to recommend STR  PT to continue to see patient in order to address the deficits listed above and provide interventions consistent with the POC in order to achieve STG's and optimize the patient's independence with functional mobility  Barriers to Discharge: Inaccessible home environment, Decreased caregiver support     PT Discharge Recommendation: Post acute rehabilitation services    See flowsheet documentation for full assessment

## 2023-05-19 LAB
ANION GAP SERPL CALCULATED.3IONS-SCNC: 4 MMOL/L (ref 4–13)
BASOPHILS # BLD AUTO: 0.06 THOUSANDS/ÂΜL (ref 0–0.1)
BASOPHILS NFR BLD AUTO: 1 % (ref 0–1)
BUN SERPL-MCNC: 22 MG/DL (ref 5–25)
CALCIUM SERPL-MCNC: 9.9 MG/DL (ref 8.4–10.2)
CHLORIDE SERPL-SCNC: 109 MMOL/L (ref 96–108)
CO2 SERPL-SCNC: 24 MMOL/L (ref 21–32)
CREAT SERPL-MCNC: 1 MG/DL (ref 0.6–1.3)
EOSINOPHIL # BLD AUTO: 0.08 THOUSAND/ÂΜL (ref 0–0.61)
EOSINOPHIL NFR BLD AUTO: 1 % (ref 0–6)
ERYTHROCYTE [DISTWIDTH] IN BLOOD BY AUTOMATED COUNT: 14.4 % (ref 11.6–15.1)
GFR SERPL CREATININE-BSD FRML MDRD: 67 ML/MIN/1.73SQ M
GLUCOSE SERPL-MCNC: 97 MG/DL (ref 65–140)
HCT VFR BLD AUTO: 34.4 % (ref 36.5–49.3)
HGB BLD-MCNC: 11.4 G/DL (ref 12–17)
IMM GRANULOCYTES # BLD AUTO: 0.15 THOUSAND/UL (ref 0–0.2)
IMM GRANULOCYTES NFR BLD AUTO: 2 % (ref 0–2)
LYMPHOCYTES # BLD AUTO: 1.69 THOUSANDS/ÂΜL (ref 0.6–4.47)
LYMPHOCYTES NFR BLD AUTO: 18 % (ref 14–44)
MAGNESIUM SERPL-MCNC: 2.4 MG/DL (ref 1.9–2.7)
MCH RBC QN AUTO: 32 PG (ref 26.8–34.3)
MCHC RBC AUTO-ENTMCNC: 33.1 G/DL (ref 31.4–37.4)
MCV RBC AUTO: 97 FL (ref 82–98)
MONOCYTES # BLD AUTO: 0.71 THOUSAND/ÂΜL (ref 0.17–1.22)
MONOCYTES NFR BLD AUTO: 7 % (ref 4–12)
NEUTROPHILS # BLD AUTO: 6.87 THOUSANDS/ÂΜL (ref 1.85–7.62)
NEUTS SEG NFR BLD AUTO: 71 % (ref 43–75)
NRBC BLD AUTO-RTO: 0 /100 WBCS
PLATELET # BLD AUTO: 242 THOUSANDS/UL (ref 149–390)
PMV BLD AUTO: 9.4 FL (ref 8.9–12.7)
POTASSIUM SERPL-SCNC: 4.2 MMOL/L (ref 3.5–5.3)
RBC # BLD AUTO: 3.56 MILLION/UL (ref 3.88–5.62)
SODIUM SERPL-SCNC: 137 MMOL/L (ref 135–147)
WBC # BLD AUTO: 9.56 THOUSAND/UL (ref 4.31–10.16)

## 2023-05-19 RX ADMIN — ASPIRIN 81 MG: 81 TABLET, CHEWABLE ORAL at 08:49

## 2023-05-19 RX ADMIN — ENOXAPARIN SODIUM 40 MG: 40 INJECTION SUBCUTANEOUS at 03:54

## 2023-05-19 RX ADMIN — SENNOSIDES 8.6 MG: 8.6 TABLET, FILM COATED ORAL at 21:40

## 2023-05-19 RX ADMIN — ACETAMINOPHEN 975 MG: 325 TABLET, FILM COATED ORAL at 03:54

## 2023-05-19 RX ADMIN — ACETAMINOPHEN 975 MG: 325 TABLET, FILM COATED ORAL at 21:40

## 2023-05-19 RX ADMIN — NICOTINE 1 PATCH: 14 PATCH, EXTENDED RELEASE TRANSDERMAL at 08:50

## 2023-05-19 NOTE — ASSESSMENT & PLAN NOTE
Presents with L hip pain   · CT LE: Acute , mildly displaced, comminuted fracture of the LEFT acetabulum involving the anterior and posterior columns     · Orthopedic surgery following and plan for procedure today  · Given hx of dementia will keep on strict bed rest overnight, NWB LLE    · PT recommending short term rehab  · AMPAC score 6, requires 2 assist for ambulation  · Family is agreeable to rehab but patient had reported to staff suicidal ideation, so now on observation  · Consulted psych, who also has low suspicion for SI, but patient was in mild distress yesterday so will re evaluate today   · Does have dementia, and unlikely truly a threat to himself or others, discontinued observation today

## 2023-05-19 NOTE — PROGRESS NOTES
[address] [place] [address]  Progress Note  Name: Danay Rosado  MRN: [id number]  Unit/Bed#: -01 I Date of Admission: 5/13/2023   Date of Service: 5/19/2023 I Hospital Day: 6    Assessment/Plan   * Closed displaced fracture of left acetabulum [facility])  Assessment & Plan  Presents with L hip pain   · CT LE: Acute , mildly displaced, comminuted fracture of the LEFT acetabulum involving the anterior and posterior columns  · Orthopedic surgery following and plan for procedure today  · Given hx of dementia will keep on strict bed rest overnight, NWB LLE    · PT recommending short term rehab  · AMPAC score 6, requires 2 assist for ambulation  · Family is agreeable to rehab but patient had reported to staff suicidal ideation, so now on observation  · Consulted psych, who also has low suspicion for SI, but patient was in mild distress yesterday so will re evaluate today   · Does have dementia, and unlikely truly a threat to himself or others, discontinued observation today                VTE Pharmacologic Prophylaxis: VTE Score: 8 High Risk (Score >/= 5) - Pharmacological DVT Prophylaxis Ordered: enoxaparin (Lovenox)  Sequential Compression Devices Ordered  Patient Centered Rounds: I performed bedside rounds with nursing staff today  Discussions with Specialists or Other Care Team Provider: gibran    Education and Discussions with Family / Patient: Updated  (son) via phone  Total Time Spent on Date of Encounter in care of patient: 35 minutes This time was spent on one or more of the following: performing physical exam; counseling and coordination of care; obtaining or reviewing history; documenting in the medical record; reviewing/ordering tests, medications or procedures; communicating with other healthcare professionals and discussing with patient's family/caregivers      Current Length of Stay: 6 day(s)  Current Patient Status: Inpatient   Certification Statement: The patient will continue to require additional inpatient hospital stay due to placement- needs to be off observation for 24 hours prior to placement  Discharge Plan: Anticipate discharge in 24-48 hrs to rehab facility  Code Status: Level 1 - Full Code    Subjective:   Observation discontinued today  Objective:     Vitals:   Temp (24hrs), Av 4 °F (36 3 °C), Min:97 3 °F (36 3 °C), Max:97 6 °F (36 4 °C)    Temp:  [97 3 °F (36 3 °C)-97 6 °F (36 4 °C)] 97 3 °F (36 3 °C)  HR:  [79] 79  Resp:  [18] 18  BP: (130-163)/(69-77) 153/77  SpO2:  [95 %] 95 %  Body mass index is 19 36 kg/m²  Input and Output Summary (last 24 hours): Intake/Output Summary (Last 24 hours) at 2023 1054  Last data filed at 2023 0600  Gross per 24 hour   Intake 120 ml   Output 400 ml   Net -280 ml       Physical Exam:   Physical Exam  Constitutional:       General: He is not in acute distress  Appearance: Normal appearance  He is not toxic-appearing  Cardiovascular:      Rate and Rhythm: Normal rate and regular rhythm  Heart sounds: Normal heart sounds  No murmur heard  Pulmonary:      Effort: Pulmonary effort is normal  No respiratory distress  Breath sounds: Normal breath sounds  No wheezing  Abdominal:      General: Abdomen is flat  There is no distension  Palpations: Abdomen is soft  Tenderness: There is no abdominal tenderness  Neurological:      General: No focal deficit present  Mental Status: He is alert  Mental status is at baseline  Motor: No weakness            Additional Data:     Labs:  Results from last 7 days   Lab Units 23  0521   WBC Thousand/uL 9 56   HEMOGLOBIN g/dL 11 4*   HEMATOCRIT % 34 4*   PLATELETS Thousands/uL 242   NEUTROS PCT % 71   LYMPHS PCT % 18   MONOS PCT % 7   EOS PCT % 1     Results from last 7 days   Lab Units 23  0521 05/15/23  0524 23  0457   SODIUM mmol/L 137   < > 137   POTASSIUM mmol/L 4 2   < > 3 8   CHLORIDE mmol/L 109*   < > 110*   CO2 mmol/L 24   < > 23   BUN mg/dL 22   < > 29*   CREATININE mg/dL 1 00   < > 1 01   ANION GAP mmol/L 4   < > 4   CALCIUM mg/dL 9 9   < > 9 9   ALBUMIN g/dL  --   --  3 4*   TOTAL BILIRUBIN mg/dL  --   --  0 94   ALK PHOS U/L  --   --  90   ALT U/L  --   --  13   AST U/L  --   --  17   GLUCOSE RANDOM mg/dL 97   < > 89    < > = values in this interval not displayed  Lines/Drains:  Invasive Devices     Peripheral Intravenous Line  Duration           Peripheral IV 05/16/23 Left;Ventral (anterior) Forearm 2 days                      Imaging: No pertinent imaging reviewed  Recent Cultures (last 7 days):         Last 24 Hours Medication List:   Current Facility-Administered Medications   Medication Dose Route Frequency Provider Last Rate   • acetaminophen  975 mg Oral Q8H Boaz Melara PA-C     • aspirin  81 mg Oral Daily Elia Jeans, PA-C     • bisacodyl  5 mg Oral Daily PRN Betsy Harden MD     • docusate sodium  100 mg Oral BID Betsy Harden MD     • enoxaparin  40 mg Subcutaneous Q24H [address] Vern Kumar PA-C     • lidocaine  1 patch Topical Daily PRN Elia Jeans, PA-C     • naloxone  0 04 mg Intravenous Q1MIN PRN Elia Jeans, PA-C     • niacin  500 mg Oral Daily With Porfirio Kumar PA-C     • nicotine  1 patch Transdermal Daily Elia Jeans, PA-C     • oxyCODONE  2 5 mg Oral Q6H PRN Elia Jeans, PA-C     • polyethylene glycol  17 g Oral Daily PRN Elia Jeans, PA-C     • polyethylene glycol  17 g Oral Daily Betsy Harden MD     • senna  1 tablet Oral HS Betsy Harden MD          Today, Patient Was Seen By: Walker Gifford MD    **Please Note: This note may have been constructed using a voice recognition system  **

## 2023-05-19 NOTE — PROGRESS NOTES
-- Patient:  -- MRN: [id number]  -- Aidin Request ID: [id number]  -- Level of care reserved: translation missing: en provider  provider_type  -- Partner Reserved:  -- Clinical needs requested:  -- Geography searched:  -- Start of Service:  -- Request sent: 12:56pm EDT on 5/16/2023 by Kwan Arteaga  -- Partner reserved:  -- Choice list shared:

## 2023-05-19 NOTE — PLAN OF CARE
Problem: Potential for Falls  Goal: Patient will remain free of falls  Description: INTERVENTIONS:  - Educate patient/family on patient safety including physical limitations  - Instruct patient to call for assistance with activity   - Consult OT/PT to assist with strengthening/mobility   - Keep Call bell within reach  - Keep bed low and locked with side rails adjusted as appropriate  - Keep care items and personal belongings within reach  - Initiate and maintain comfort rounds  - Make Fall Risk Sign visible to staff  - Offer Toileting every 2  Hours, in advance of need  - Initiate/Maintain bed alarm  - Obtain necessary fall risk management equipment  - Apply yellow socks and bracelet for high fall risk patients  - Consider moving patient to room near nurses station  Outcome: Progressing     Problem: MOBILITY - ADULT  Goal: Maintain or return to baseline ADL function  Description: INTERVENTIONS:  -  Assess patient's ability to carry out ADLs; assess patient's baseline for ADL function and identify physical deficits which impact ability to perform ADLs (bathing, care of mouth/teeth, toileting, grooming, dressing, etc )  - Assess/evaluate cause of self-care deficits   - Assess range of motion  - Assess patient's mobility; develop plan if impaired  - Assess patient's need for assistive devices and provide as appropriate  - Encourage maximum independence but intervene and supervise when necessary  - Involve family in performance of ADLs  - Assess for home care needs following discharge   - Consider OT consult to assist with ADL evaluation and planning for discharge  - Provide patient education as appropriate  Outcome: Progressing  Goal: Maintains/Returns to pre admission functional level  Description: INTERVENTIONS:  - Perform BMAT or MOVE assessment daily    - Set and communicate daily mobility goal to care team and patient/family/caregiver     - Collaborate with rehabilitation services on mobility goals if consulted  - Perform Range of Motion 3 times a day  - Reposition patient every 2 hours    - Dangle patient 3 times a day  - Stand patient 3 times a day  - Ambulate patient 3 times a day  - Out of bed to chair 3 times a day   - Out of bed for meals 3 times a day  - Out of bed for toileting  - Record patient progress and toleration of activity level   Outcome: Progressing     Problem: Prexisting or High Potential for Compromised Skin Integrity  Goal: Skin integrity is maintained or improved  Description: INTERVENTIONS:  - Identify patients at risk for skin breakdown  - Assess and monitor skin integrity  - Assess and monitor nutrition and hydration status  - Monitor labs   - Assess for incontinence   - Turn and reposition patient  - Assist with mobility/ambulation  - Relieve pressure over bony prominences  - Avoid friction and shearing  - Provide appropriate hygiene as needed including keeping skin clean and dry  - Evaluate need for skin moisturizer/barrier cream  - Collaborate with interdisciplinary team   - Patient/family teaching  - Consider wound care consult   Outcome: Progressing

## 2023-05-19 NOTE — CASE MANAGEMENT
Case Management Discharge Planning Note    Patient name Chelly Magaña  Location [address] 205/-90 MRN [id number]  : 1935 Date 2023       Current Admission Date: 2023  Current Admission Diagnosis:Closed displaced fracture of left acetabulum [facility])   Patient Active Problem List    Diagnosis Date Noted   • Pre-operative clearance 2023   • Closed displaced fracture of left acetabulum ([facility] ) 2023   • Aortic aneurysm ([facility] ) 2023   • Abnormal CT of the abdomen 2023   • Positive blood culture 2022   • Delirium 2020   • Fall 2020   • Bladder cancer ([facility] ) 2020   • CAD (coronary artery disease) 2020   • H/O heart artery stent    • Umbilical hernia    • Odontoid fracture with type II morphology ([place] [address] ) 2020   • Multiple abrasions 2020   • Nasal bones, closed fracture 2020   • Atelectasis 2020   • Pulmonary nodule 2020   • Renal cyst 2020   • Liver cyst 2020      LOS (days): 6  Geometric Mean LOS (GMLOS) (days): 3 80  Days to GMLOS:-2     OBJECTIVE:  Risk of Unplanned Readmission Score: 12 79      Current admission status: Inpatient   Preferred Pharmacy:   [address] [place] [id number]  Phone: [phone] Fax: [phone]    Primary Care Provider: Ginette Valencia PA-C    Primary Insurance: Haseeb Olsen [facility] RE[facility]  Secondary Insurance:     DISCHARGE DETAILS:  Patient reviewed during Interdisciplinary Rounds with SLIM today  Patient off 1:1 as of this morning  CM re-opened ARC & STR referrals for review  Patient will need insurance auth prior to d/c  CM was unable to speak with family today to complete IMM and provide update  Handoff updated for weekend CM

## 2023-05-20 LAB
ANION GAP SERPL CALCULATED.3IONS-SCNC: 4 MMOL/L (ref 4–13)
BASOPHILS # BLD AUTO: 0.05 THOUSANDS/ÂΜL (ref 0–0.1)
BASOPHILS NFR BLD AUTO: 1 % (ref 0–1)
BUN SERPL-MCNC: 22 MG/DL (ref 5–25)
CALCIUM SERPL-MCNC: 9.9 MG/DL (ref 8.4–10.2)
CHLORIDE SERPL-SCNC: 108 MMOL/L (ref 96–108)
CO2 SERPL-SCNC: 24 MMOL/L (ref 21–32)
CREAT SERPL-MCNC: 0.97 MG/DL (ref 0.6–1.3)
EOSINOPHIL # BLD AUTO: 0.12 THOUSAND/ÂΜL (ref 0–0.61)
EOSINOPHIL NFR BLD AUTO: 2 % (ref 0–6)
ERYTHROCYTE [DISTWIDTH] IN BLOOD BY AUTOMATED COUNT: 14.6 % (ref 11.6–15.1)
GFR SERPL CREATININE-BSD FRML MDRD: 69 ML/MIN/1.73SQ M
GLUCOSE SERPL-MCNC: 96 MG/DL (ref 65–140)
HCT VFR BLD AUTO: 33.8 % (ref 36.5–49.3)
HGB BLD-MCNC: 11.5 G/DL (ref 12–17)
IMM GRANULOCYTES # BLD AUTO: 0.17 THOUSAND/UL (ref 0–0.2)
IMM GRANULOCYTES NFR BLD AUTO: 2 % (ref 0–2)
LYMPHOCYTES # BLD AUTO: 1.39 THOUSANDS/ÂΜL (ref 0.6–4.47)
LYMPHOCYTES NFR BLD AUTO: 18 % (ref 14–44)
MAGNESIUM SERPL-MCNC: 2.3 MG/DL (ref 1.9–2.7)
MCH RBC QN AUTO: 33.2 PG (ref 26.8–34.3)
MCHC RBC AUTO-ENTMCNC: 34 G/DL (ref 31.4–37.4)
MCV RBC AUTO: 98 FL (ref 82–98)
MONOCYTES # BLD AUTO: 0.56 THOUSAND/ÂΜL (ref 0.17–1.22)
MONOCYTES NFR BLD AUTO: 7 % (ref 4–12)
NEUTROPHILS # BLD AUTO: 5.39 THOUSANDS/ÂΜL (ref 1.85–7.62)
NEUTS SEG NFR BLD AUTO: 70 % (ref 43–75)
NRBC BLD AUTO-RTO: 0 /100 WBCS
PLATELET # BLD AUTO: 270 THOUSANDS/UL (ref 149–390)
PMV BLD AUTO: 9.1 FL (ref 8.9–12.7)
POTASSIUM SERPL-SCNC: 4.2 MMOL/L (ref 3.5–5.3)
RBC # BLD AUTO: 3.46 MILLION/UL (ref 3.88–5.62)
SODIUM SERPL-SCNC: 136 MMOL/L (ref 135–147)
WBC # BLD AUTO: 7.68 THOUSAND/UL (ref 4.31–10.16)

## 2023-05-20 RX ADMIN — POLYETHYLENE GLYCOL 3350 17 G: 17 POWDER, FOR SOLUTION ORAL at 08:56

## 2023-05-20 RX ADMIN — LIDOCAINE 5% 1 PATCH: 700 PATCH TOPICAL at 11:51

## 2023-05-20 RX ADMIN — NICOTINE 1 PATCH: 14 PATCH, EXTENDED RELEASE TRANSDERMAL at 08:56

## 2023-05-20 RX ADMIN — DOCUSATE SODIUM 100 MG: 100 CAPSULE, LIQUID FILLED ORAL at 18:04

## 2023-05-20 RX ADMIN — ACETAMINOPHEN 975 MG: 325 TABLET, FILM COATED ORAL at 18:04

## 2023-05-20 RX ADMIN — Medication 2.5 MG: at 22:03

## 2023-05-20 RX ADMIN — ACETAMINOPHEN 975 MG: 325 TABLET, FILM COATED ORAL at 02:10

## 2023-05-20 RX ADMIN — ENOXAPARIN SODIUM 40 MG: 40 INJECTION SUBCUTANEOUS at 02:10

## 2023-05-20 RX ADMIN — Medication 2.5 MG: at 11:51

## 2023-05-20 RX ADMIN — ASPIRIN 81 MG: 81 TABLET, CHEWABLE ORAL at 08:47

## 2023-05-20 RX ADMIN — Medication 500 MG: at 18:04

## 2023-05-20 RX ADMIN — SENNOSIDES 8.6 MG: 8.6 TABLET, FILM COATED ORAL at 22:03

## 2023-05-20 RX ADMIN — DOCUSATE SODIUM 100 MG: 100 CAPSULE, LIQUID FILLED ORAL at 08:47

## 2023-05-20 RX ADMIN — ACETAMINOPHEN 975 MG: 325 TABLET, FILM COATED ORAL at 11:51

## 2023-05-20 NOTE — CASE MANAGEMENT
Case Management Discharge Planning Note    Patient name Seamus Ravi  Location [address] 205/-51 MRN [id number]  : 1935 Date 2023       Current Admission Date: 2023  Current Admission Diagnosis:Closed displaced fracture of left acetabulum [facility])   Patient Active Problem List    Diagnosis Date Noted   • Pre-operative clearance 2023   • Closed displaced fracture of left acetabulum ([facility] ) 2023   • Aortic aneurysm ([facility] ) 2023   • Abnormal CT of the abdomen 2023   • Positive blood culture 2022   • Delirium 2020   • Fall 2020   • Bladder cancer ([facility] ) 2020   • CAD (coronary artery disease) 2020   • H/O heart artery stent    • Umbilical hernia    • Odontoid fracture with type II morphology ([place] [address] ) 2020   • Multiple abrasions 2020   • Nasal bones, closed fracture 2020   • Atelectasis 2020   • Pulmonary nodule 2020   • Renal cyst 2020   • Liver cyst 2020      LOS (days): 7  Geometric Mean LOS (GMLOS) (days): 3 80  Days to GMLOS:-2 8     OBJECTIVE:  Risk of Unplanned Readmission Score: 12 92         Current admission status: Inpatient   Preferred Pharmacy:   [address] [place] [id number]  Phone: [phone] Fax: [phone]    Primary Care Provider: Shannon Seymour PA-C    Primary Insurance: [facility]  Secondary Insurance:     DISCHARGE DETAILS:                                          Other Referral/Resources/Interventions Provided:  Referral Comments: Pt off 1:1 since 0830 on   S/w Elvis Raymond at [facility];  she wants pt off 1:1 for 48 hrs before making a determination  Eloisa at [facility] also wants pt off 1:1 x 48 hrs  Bed offers from [facility] and [facility]    T/C to ahmet Gardner to update;  he reports he doesn't even know where [facility] is and is not interested in pt placement there   Son interested in [facility];  pt was at [facility] two years ago and son reports he didn't like how the pt was treated there  Will f/u w facilities tomorrow after 48 hr period free of 1:1 is completed           Treatment Team Recommendation: Short Term Rehab  Discharge Destination Plan[de-identified] Short Term Rehab  Transport at Discharge : BLS Ambulance (likely BLS)

## 2023-05-20 NOTE — QUICK NOTE
I updated the son over the phone  I told him insurance authorization will likely not be obtained over the weekends however he was stating that he would prefer his dad to go to acute rehab and referrals will be placed  I did tell him that there is a good chance he may not qualify but we can always try

## 2023-05-20 NOTE — PLAN OF CARE
Problem: Potential for Falls  Goal: Patient will remain free of falls  Description: INTERVENTIONS:  - Educate patient/family on patient safety including physical limitations  - Instruct patient to call for assistance with activity   - Consult OT/PT to assist with strengthening/mobility   - Keep Call bell within reach  - Keep bed low and locked with side rails adjusted as appropriate  - Keep care items and personal belongings within reach  - Initiate and maintain comfort rounds  - Make Fall Risk Sign visible to staff  - Offer Toileting every  Hours, in advance of need  - Initiate/Maintain alarm  - Obtain necessary fall risk management equipme  - Apply yellow socks and bracelet for high fall risk patients  - Consider moving patient to room near nurses station  Outcome: Progressing

## 2023-05-20 NOTE — ASSESSMENT & PLAN NOTE
Presents with L hip pain   · CT LE: Acute , mildly displaced, comminuted fracture of the LEFT acetabulum involving the anterior and posterior columns  ·  ORIF L transverse acetabulum fracture 5/15  · PT recommending short term rehab  Family was requesting acute rehab  We will follow-up with case management today  · AM  · Patient does not seem to be a threat to himself or anyone else    · Consulted psych, who also has low suspicion for SI, but patient was in mild distress yesterday so will re evaluate today   · Does have dementia, and unlikely truly a threat to himself or others,

## 2023-05-20 NOTE — ASSESSMENT & PLAN NOTE
· Patient denies shortness of breath or chest pain prior to fall  · Echocardiogram-EF 55% and wall motion normal; aortic valve mild to moderate regurgitation  · EKG-normal sinus rhythm  ·   · Patient with no postop complications

## 2023-05-20 NOTE — PROGRESS NOTES
[facility]  Progress Note  Name: Lilian Almonte  MRN: [id number]  Unit/Bed#: -01 I Date of Admission: 5/13/2023   Date of Service: 5/20/2023 I Hospital Day: 7    Assessment/Plan   Pre-operative clearance  Assessment & Plan  · Patient denies shortness of breath or chest pain prior to fall  · Echocardiogram-EF 55% and wall motion normal; aortic valve mild to moderate regurgitation  · EKG-normal sinus rhythm  ·   · Patient with no postop complications    Abnormal CT of the abdomen  Assessment & Plan  · CT revealed perivesical fat stranding, suggestive of cystitis and patchy opacities at the lung bases, which may represent atelectasis versus pneumonia  · No leukocytosis or fevers   · UA negative   · monitor off atb     Aortic aneurysm [facility])  Assessment & Plan  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 with patent graft, however increased size of maximum aneurysm sac diameter now measuring 4 9 compared to 4 6 cm in April 2020 with findings suggestive of an endoleak  · 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 also mildly enlarged from 2 0 cm in April 2020  · ED discussed with vascular surgery  Type 2 endoleak  No urgent intervention is necessary at this time  · We will hold off on anticoagulation for now given possibility of surgical intervention for hip repair  Consider further discussion with vascular surgery if anticoagulation is warranted, and risk versus benefit in this patient with history of dementia/falls  · Did discuss with vascular surgery and they do recommend outpatient follow-up; will give referral on discharge    CAD (coronary artery disease)  Assessment & Plan  · Unknown as to whether patient has had cardiac stents in the past as patient is unsure and son is unsure  · Echocardiogram noted      * Closed displaced fracture of left acetabulum [facility])  Assessment & Plan  Presents with L hip pain   · CT LE: Acute , mildly displaced, comminuted fracture of the LEFT acetabulum involving the anterior and posterior columns  ·  ORIF L transverse acetabulum fracture 5/15  · PT recommending short term rehab  Family was requesting acute rehab  We will follow-up with case management today  · AM  · Patient does not seem to be a threat to himself or anyone else  · Consulted psych, who also has low suspicion for SI, but patient was in mild distress yesterday so will re evaluate today   · Does have dementia, and unlikely truly a threat to himself or others,             VTE Pharmacologic Prophylaxis:   Pharmacologic: Enoxaparin (Lovenox)  Mechanical VTE Prophylaxis in Place: Yes    Patient Centered Rounds: I have performed bedside rounds with nursing staff today  Education and Discussions with Family / Patient: I will discuss with the family    Time Spent for Care: 20 minutes  More than 50% of total time spent on counseling and coordination of care as described above  Current Length of Stay: 7 day(s)    Current Patient Status: Inpatient   Certification Statement: The patient will continue to require additional inpatient hospital stay due to Needing placement    Discharge Plan: Discharge when placement available  Will discuss with case management    Code Status: Level 1 - Full Code      Subjective:   Patient seen and examined  Doing well  Oriented to time and place    Objective:     Vitals:   Temp (24hrs), Av 9 °F (36 6 °C), Min:97 6 °F (36 4 °C), Max:98 °F (36 7 °C)    Temp:  [97 6 °F (36 4 °C)-98 °F (36 7 °C)] 98 °F (36 7 °C)  HR:  [73] 73  Resp:  [16] 16  BP: (148-154)/(75-80) 154/80  SpO2:  [94 %] 94 %  Body mass index is 18 82 kg/m²  Input and Output Summary (last 24 hours):        Intake/Output Summary (Last 24 hours) at 2023 9860  Last data filed at 2023 0300  Gross per 24 hour   Intake 240 ml   Output 200 ml   Net 40 ml       Physical Exam:     Physical Exam  (   General Appearance:    Alert, cooperative, no distress, appears stated age                               Lungs:     Clear to auscultation bilaterally, respirations unlabored       Heart:    Regular rate and rhythm, S1 and S2 normal, no murmur, rub    or gallop   Abdomen:     Soft, non-tender, bowel sounds active all four quadrants,     no masses, no organomegaly           Extremities:   Extremities normal, atraumatic, no cyanosis or edema       Additional Data:     Labs:    Results from last 7 days   Lab Units 05/20/23  0531   WBC Thousand/uL 7 68   HEMOGLOBIN g/dL 11 5*   HEMATOCRIT % 33 8*   PLATELETS Thousands/uL 270   NEUTROS PCT % 70   LYMPHS PCT % 18   MONOS PCT % 7   EOS PCT % 2     Results from last 7 days   Lab Units 05/20/23  0531 05/15/23  0524 05/14/23  0457   SODIUM mmol/L 136   < > 137   POTASSIUM mmol/L 4 2   < > 3 8   CHLORIDE mmol/L 108   < > 110*   CO2 mmol/L 24   < > 23   BUN mg/dL 22   < > 29*   CREATININE mg/dL 0 97   < > 1 01   ANION GAP mmol/L 4   < > 4   CALCIUM mg/dL 9 9   < > 9 9   ALBUMIN g/dL  --   --  3 4*   TOTAL BILIRUBIN mg/dL  --   --  0 94   ALK PHOS U/L  --   --  90   ALT U/L  --   --  13   AST U/L  --   --  17   GLUCOSE RANDOM mg/dL 96   < > 89    < > = values in this interval not displayed  * I Have Reviewed All Lab Data Listed Above  * Additional Pertinent Lab Tests Reviewed:  [facility]        Recent Cultures (last 7 days):           Last 24 Hours Medication List:   Current Facility-Administered Medications   Medication Dose Route Frequency Provider Last Rate   • acetaminophen  975 mg Oral Q8H Cristino Almaraz PA-C     • aspirin  81 mg Oral Daily Brian Buckley PA-C     • bisacodyl  5 mg Oral Daily PRN Andres Moses MD     • docusate sodium  100 mg Oral BID Andres Moses MD     • enoxaparin  40 mg Subcutaneous Q24H [address] Vern Kumar PA-C     • lidocaine  1 patch Topical Daily PRN Brian Buckley PA-C     • naloxone  0 04 mg Intravenous Q1MIN PRN Dane Donnelly PA-C     • niacin  500 mg Oral Daily With Porfirio Kumar PA-C     • nicotine  1 patch Transdermal Daily Dane Donnelly PA-C     • oxyCODONE  2 5 mg Oral Q6H PRN Dane Donnelly PA-C     • polyethylene glycol  17 g Oral Daily PRN Dane Donnelly PA-C     • polyethylene glycol  17 g Oral Daily Sandra Botello MD     • senna  1 tablet Oral HS Sandra Botello MD          Today, Patient Was Seen By: Juliane Gayle MD    ** Please Note: Dictation voice to text software may have been used in the creation of this document   **

## 2023-05-20 NOTE — PROGRESS NOTES
Progress Note - Orthopedics   Enedelia Rosario 80 y o  male MRN: [id number]  Unit/Bed#: -01      Subjective:    80 y  o male POD#5 Left percutaneous acetabulum ORIF  Patient states that his hip is doing well overall  Patient states he has minimal pain in the hip currently  Patient states he has been ambulating with physical therapy as directed  Patient denies any fevers any chills  Patient denies any shortness of breath chest tightness chest pain  Patient is currently awaiting rehab placement  Patient offers no other complaints at this time       Labs:  0   Lab Value Date/Time    HCT 33 8 (L) 05/20/2023 0531    HCT 34 4 (L) 05/19/2023 0521    HCT 33 7 (L) 05/16/2023 0420    HGB 11 5 (L) 05/20/2023 0531    HGB 11 4 (L) 05/19/2023 0521    HGB 11 2 (L) 05/16/2023 0420    INR 1 06 04/20/2022 1828    WBC 7 68 05/20/2023 0531    WBC 9 56 05/19/2023 0521    WBC 12 66 (H) 05/16/2023 0420       Meds:    Current Facility-Administered Medications:   •  acetaminophen (TYLENOL) tablet 975 mg, 975 mg, Oral, Q8H [address], Vern Kumar PA-C, 975 mg at 05/20/23 0210  •  aspirin chewable tablet 81 mg, 81 mg, Oral, Daily, Yessi Hughes PA-C, 81 mg at 05/20/23 0487  •  bisacodyl (DULCOLAX) EC tablet 5 mg, 5 mg, Oral, Daily PRN, Kenton Vinson MD  •  docusate sodium (COLACE) capsule 100 mg, 100 mg, Oral, BID, Kenton Vinson MD, 100 mg at 05/20/23 0847  •  enoxaparin (LOVENOX) subcutaneous injection 40 mg, 40 mg, Subcutaneous, Q24H [address], Vern Kumar PA-C, 40 mg at 05/20/23 0210  •  lidocaine (LIDODERM) 5 % patch 1 patch, 1 patch, Topical, Daily PRN, Yessi Hughes PA-C  •  naloxone (NARCAN) 0 04 mg/mL syringe 0 04 mg, 0 04 mg, Intravenous, Q1MIN PRN, Yessi Hughes PA-C  •  niacin (NIASPAN) CR tablet 500 mg, 500 mg, Oral, Daily With Dinner, Michelle Kumar PA-C, 500 mg at 05/18/23 1816  •  nicotine (NICODERM CQ) 14 mg/24hr TD 24 hr patch 1 patch, 1 patch, Transdermal, Daily, Michelle Kurtz MOHSEN Kumar, 1 patch at 05/20/23 8199  •  oxyCODONE (ROXICODONE) split tablet 2 5 mg, 2 5 mg, Oral, Q6H PRN, Isabella Thornton PA-C, 2 5 mg at 05/18/23 0031  •  polyethylene glycol (MIRALAX) packet 17 g, 17 g, Oral, Daily PRN, Isabella Thornton PA-C  •  polyethylene glycol (MIRALAX) packet 17 g, 17 g, Oral, Daily, Bishop Smith MD, 17 g at 05/20/23 0856  •  senna (SENOKOT) tablet 8 6 mg, 1 tablet, Oral, HS, Bishop Smith MD, 8 6 mg at 05/19/23 2140    Blood Culture:   Lab Results   Component Value Date    BLOODCX No Growth After 5 Days  04/20/2022    BLOODCX Bacillus species NOT anthracis (A) 04/20/2022       Wound Culture:   No results found for: WOUNDCULT    Ins and Outs:  I/O last 24 hours: In: 240 [P O :240]  Out: 200 [Urine:200]      Physical:  Vitals:    05/20/23 0712   BP: 154/80   Pulse:    Resp:    Temp: 98 °F (36 7 °C)   SpO2:      Musculoskeletal: left Lower Extremity  · Patient resting comfortably in hospital bed in no distress  · Skin : Mild discoloration present over the sacrum consistent with early stage pressure injury  Incision sites clean and dry with sutures intact  No surrounding erythema or ecchymosis present  · Dressing  :  N/A   · TTP  :  Mild TTP noted over incision site   · Sensation intact to saphenous, sural, tibial, superficial peroneal nerve, and deep peroneal  · Motor intact to +FHL/EHL, +ankle dorsi/plantar flexion  · 2+ DP pulse, symmetric bilaterally  · Digits warm and well perfused  · Capillary refill < 2 seconds    Assessment:    80 y  o male POD#5 Left percutaneous acetabulum ORIF   Patient doing well  Plan:  • Toe touch weightbearing/50% flat foot weightbearing Left lower extremity with use of walker for safe ambulation  • Recommend offloading of the sacrum to   • PT/OT- up and out of bed with above restrictions  • Pain control  • DVT ppx- Recommend 4 weeks of anticoagulation post operatively   • Dispo: Overall, patient doing well s/p surgical intervention  "Please see above for weightbearing recommendations  Once patient medically stable, he may follow up in office with Dr Doris Tom 2 weeks post operatively for reevaluation, new x-rays and consideration of suture removal              Chloe Angel PA-C                    Portions of the record may have been created with voice recognition software  Occasional wrong word or \"sound a like\" substitutions may have occurred due to the inherent limitations of voice recognition software  Read the chart carefully and recognize, using context, where substitutions have occurred        "

## 2023-05-21 LAB
ANION GAP SERPL CALCULATED.3IONS-SCNC: 5 MMOL/L (ref 4–13)
BASOPHILS # BLD AUTO: 0.05 THOUSANDS/ÂΜL (ref 0–0.1)
BASOPHILS NFR BLD AUTO: 1 % (ref 0–1)
BUN SERPL-MCNC: 24 MG/DL (ref 5–25)
CALCIUM SERPL-MCNC: 9.5 MG/DL (ref 8.4–10.2)
CHLORIDE SERPL-SCNC: 108 MMOL/L (ref 96–108)
CO2 SERPL-SCNC: 23 MMOL/L (ref 21–32)
CREAT SERPL-MCNC: 0.97 MG/DL (ref 0.6–1.3)
EOSINOPHIL # BLD AUTO: 0.11 THOUSAND/ÂΜL (ref 0–0.61)
EOSINOPHIL NFR BLD AUTO: 2 % (ref 0–6)
ERYTHROCYTE [DISTWIDTH] IN BLOOD BY AUTOMATED COUNT: 14.6 % (ref 11.6–15.1)
GFR SERPL CREATININE-BSD FRML MDRD: 69 ML/MIN/1.73SQ M
GLUCOSE SERPL-MCNC: 94 MG/DL (ref 65–140)
HCT VFR BLD AUTO: 32.9 % (ref 36.5–49.3)
HGB BLD-MCNC: 10.9 G/DL (ref 12–17)
IMM GRANULOCYTES # BLD AUTO: 0.11 THOUSAND/UL (ref 0–0.2)
IMM GRANULOCYTES NFR BLD AUTO: 2 % (ref 0–2)
LYMPHOCYTES # BLD AUTO: 1.53 THOUSANDS/ÂΜL (ref 0.6–4.47)
LYMPHOCYTES NFR BLD AUTO: 21 % (ref 14–44)
MAGNESIUM SERPL-MCNC: 2.4 MG/DL (ref 1.9–2.7)
MCH RBC QN AUTO: 32.1 PG (ref 26.8–34.3)
MCHC RBC AUTO-ENTMCNC: 33.1 G/DL (ref 31.4–37.4)
MCV RBC AUTO: 97 FL (ref 82–98)
MONOCYTES # BLD AUTO: 0.63 THOUSAND/ÂΜL (ref 0.17–1.22)
MONOCYTES NFR BLD AUTO: 9 % (ref 4–12)
NEUTROPHILS # BLD AUTO: 4.88 THOUSANDS/ÂΜL (ref 1.85–7.62)
NEUTS SEG NFR BLD AUTO: 65 % (ref 43–75)
NRBC BLD AUTO-RTO: 0 /100 WBCS
PLATELET # BLD AUTO: 303 THOUSANDS/UL (ref 149–390)
PMV BLD AUTO: 9.2 FL (ref 8.9–12.7)
POTASSIUM SERPL-SCNC: 4.4 MMOL/L (ref 3.5–5.3)
RBC # BLD AUTO: 3.4 MILLION/UL (ref 3.88–5.62)
SODIUM SERPL-SCNC: 136 MMOL/L (ref 135–147)
WBC # BLD AUTO: 7.31 THOUSAND/UL (ref 4.31–10.16)

## 2023-05-21 RX ORDER — OXYCODONE HYDROCHLORIDE 5 MG/1
5 TABLET ORAL EVERY 6 HOURS PRN
Status: DISCONTINUED | OUTPATIENT
Start: 2023-05-21 | End: 2023-05-25 | Stop reason: HOSPADM

## 2023-05-21 RX ADMIN — DOCUSATE SODIUM 100 MG: 100 CAPSULE, LIQUID FILLED ORAL at 19:07

## 2023-05-21 RX ADMIN — NICOTINE 1 PATCH: 14 PATCH, EXTENDED RELEASE TRANSDERMAL at 08:32

## 2023-05-21 RX ADMIN — OXYCODONE HYDROCHLORIDE 5 MG: 5 TABLET ORAL at 16:53

## 2023-05-21 RX ADMIN — LIDOCAINE 5% 1 PATCH: 700 PATCH TOPICAL at 11:14

## 2023-05-21 RX ADMIN — Medication 2.5 MG: at 11:14

## 2023-05-21 RX ADMIN — ACETAMINOPHEN 975 MG: 325 TABLET, FILM COATED ORAL at 19:08

## 2023-05-21 RX ADMIN — ASPIRIN 81 MG: 81 TABLET, CHEWABLE ORAL at 08:31

## 2023-05-21 RX ADMIN — SENNOSIDES 8.6 MG: 8.6 TABLET, FILM COATED ORAL at 22:06

## 2023-05-21 RX ADMIN — ACETAMINOPHEN 975 MG: 325 TABLET, FILM COATED ORAL at 02:24

## 2023-05-21 RX ADMIN — ENOXAPARIN SODIUM 40 MG: 40 INJECTION SUBCUTANEOUS at 02:24

## 2023-05-21 RX ADMIN — Medication 500 MG: at 16:54

## 2023-05-21 RX ADMIN — ACETAMINOPHEN 975 MG: 325 TABLET, FILM COATED ORAL at 11:14

## 2023-05-21 RX ADMIN — DOCUSATE SODIUM 100 MG: 100 CAPSULE, LIQUID FILLED ORAL at 08:32

## 2023-05-21 NOTE — ASSESSMENT & PLAN NOTE
Presents with L hip pain   · CT LE: Acute , mildly displaced, comminuted fracture of the LEFT acetabulum involving the anterior and posterior columns  ·  ORIF L transverse acetabulum fracture 5/15  · PT recommending short term rehab  Family was requesting acute rehab  We will follow-up with case management today  · AM  · Patient does not seem to be a threat to himself or anyone else    · Consulted psych, who also has low suspicion for SI, but patient was in mild distress yesterday so will re evaluate today   · Does have dementia, and unlikely truly a threat to himself or others,  · Stable for discharge

## 2023-05-21 NOTE — PLAN OF CARE
Problem: Potential for Falls  Goal: Patient will remain free of falls  Description: INTERVENTIONS:  - Educate patient/family on patient safety including physical limitations  - Instruct patient to call for assistance with activity   - Consult OT/PT to assist with strengthening/mobility   - Keep Call bell within reach  - Keep bed low and locked with side rails adjusted as appropriate  - Keep care items and personal belongings within reach  - Initiate and maintain comfort rounds  - Make Fall Risk Sign visible to staff  - Offer Toileting every  Hours, in advance of need  - Initiate/Maintain alarm  - Obtain necessary fall risk management equipment:  - Apply yellow socks and bracelet for high fall risk patients  - Consider moving patient to room near nurses station  Outcome: Progressing     Problem: MOBILITY - ADULT  Goal: Maintain or return to baseline ADL function  Description: INTERVENTIONS:  -  Assess patient's ability to carry out ADLs; assess patient's baseline for ADL function and identify physical deficits which impact ability to perform ADLs (bathing, care of mouth/teeth, toileting, grooming, dressing, etc )  - Assess/evaluate cause of self-care deficits   - Assess range of motion  - Assess patient's mobility; develop plan if impaired  - Assess patient's need for assistive devices and provide as appropriate  - Encourage maximum independence but intervene and supervise when necessary  - Involve family in performance of ADLs  - Assess for home care needs following discharge   - Consider OT consult to assist with ADL evaluation and planning for discharge  - Provide patient education as appropriate  Outcome: Progressing

## 2023-05-21 NOTE — ASSESSMENT & PLAN NOTE
· CT revealed perivesical fat stranding, suggestive of cystitis and patchy opacities at the lung bases, which may represent atelectasis versus pneumonia    · No leukocytosis or fevers   · UA negative   · Monitor off antibiotics

## 2023-05-21 NOTE — ASSESSMENT & PLAN NOTE
· Patient denies shortness of breath or chest pain prior to fall  · Echocardiogram-EF 55% and wall motion normal; aortic valve mild to moderate regurgitation  · EKG-normal sinus rhythm  · Patient with no postop complications

## 2023-05-21 NOTE — PROGRESS NOTES
Progress Note - Orthopedics   Murray Bence 80 y o  male MRN: [id number]  Unit/Bed#: -01      Subjective:    80 y  o male POD#6 Left percutaneous acetabulum ORIF  Patient states that his hip is doing well overall  Patient states he has minimal pain in the hip currently  Patient states he has been ambulating with physical therapy as directed  Patient denies any fevers any chills  Patient denies any shortness of breath chest tightness chest pain  Patient is currently awaiting rehab placement  Patient offers no other complaints at this time       Labs:  0   Lab Value Date/Time    HCT 32 9 (L) 05/21/2023 0439    HCT 33 8 (L) 05/20/2023 0531    HCT 34 4 (L) 05/19/2023 0521    HGB 10 9 (L) 05/21/2023 0439    HGB 11 5 (L) 05/20/2023 0531    HGB 11 4 (L) 05/19/2023 0521    INR 1 06 04/20/2022 1828    WBC 7 31 05/21/2023 0439    WBC 7 68 05/20/2023 0531    WBC 9 56 05/19/2023 0521       Meds:    Current Facility-Administered Medications:   •  acetaminophen (TYLENOL) tablet 975 mg, 975 mg, Oral, Q8H [address], Vern Kumar PA-C, 975 mg at 05/21/23 1114  •  aspirin chewable tablet 81 mg, 81 mg, Oral, Daily, Perla Suarez PA-C, 81 mg at 05/21/23 0831  •  bisacodyl (DULCOLAX) EC tablet 5 mg, 5 mg, Oral, Daily PRN, Teto Cardoza MD  •  docusate sodium (COLACE) capsule 100 mg, 100 mg, Oral, BID, Teto Cardoza MD, 100 mg at 05/21/23 8808  •  enoxaparin (LOVENOX) subcutaneous injection 40 mg, 40 mg, Subcutaneous, Q24H [address], Vern Kumar PA-C, 40 mg at 05/21/23 0224  •  lidocaine (LIDODERM) 5 % patch 1 patch, 1 patch, Topical, Daily PRN, Perla Suarez PA-C, 1 patch at 05/21/23 1114  •  naloxone (NARCAN) 0 04 mg/mL syringe 0 04 mg, 0 04 mg, Intravenous, Q1MIN PRN, Perla Suarez PA-C  •  niacin (NIASPAN) CR tablet 500 mg, 500 mg, Oral, Daily With Dinner, Lisa Kumar PA-C, 500 mg at 05/21/23 0243  •  nicotine (NICODERM CQ) 14 mg/24hr TD 24 hr patch 1 patch, 1 patch, Transdermal, Daily, Dane Donnelly PA-C, 1 patch at 05/21/23 0258  •  oxyCODONE (ROXICODONE) IR tablet 5 mg, 5 mg, Oral, Q6H PRN, Stefania Beyer MD, 5 mg at 05/21/23 1653  •  polyethylene glycol (MIRALAX) packet 17 g, 17 g, Oral, Daily PRN, Dane Donnelly PA-C  •  polyethylene glycol (MIRALAX) packet 17 g, 17 g, Oral, Daily, Sandra Botello MD, 17 g at 05/20/23 0856  •  senna (SENOKOT) tablet 8 6 mg, 1 tablet, Oral, HS, Sandra Botello MD, 8 6 mg at 05/20/23 2203    Blood Culture:   Lab Results   Component Value Date    BLOODCX No Growth After 5 Days  04/20/2022    BLOODCX Bacillus species NOT anthracis (A) 04/20/2022       Wound Culture:   No results found for: WOUNDCULT    Ins and Outs:  I/O last 24 hours: In: 240 [P O :240]  Out: 1900 [Urine:1900]          Physical:  Vitals:    05/21/23 1449   BP: 129/62   Pulse:    Resp:    Temp: (!) 97 2 °F (36 2 °C)   SpO2:        Musculoskeletal: left Lower Extremity  • Patient resting comfortably in hospital bed in no distress  • Skin :   Incision sites clean and dry with sutures intact  No surrounding erythema or ecchymosis present  • Dressing  :  N/A   • TTP  :  Mild TTP noted over incision site   • Sensation intact to saphenous, sural, tibial, superficial peroneal nerve, and deep peroneal  • Motor intact to +FHL/EHL, +ankle dorsi/plantar flexion  • 2+ DP pulse, symmetric bilaterally  • Digits warm and well perfused  • Capillary refill < 2 seconds      Assessment:    80 y  o male 80 y  o male POD#6 Left percutaneous acetabulum ORIF   Resolved sacral pressure injury  Patient doing well         Plan:  • Toe touch weightbearing/50% flat foot weightbearing Left lower extremity with use of walker for safe ambulation  • Continue to offload left side to prevent pressure injuries  /  Wound dehiscence   • PT/OT- up and out of bed with above restrictions  • Pain control  • DVT ppx- Recommend 4 weeks of anticoagulation post operatively   • Dispo: Overall, patient doing well s/p "surgical intervention  Please see above for weightbearing recommendations  Once patient medically stable, he may follow up in office with Dr Levy Presume 2 weeks post operatively for reevaluation, new x-rays and consideration of suture removal             Dirk Dale PA-C              Portions of the record may have been created with voice recognition software  Occasional wrong word or \"sound a like\" substitutions may have occurred due to the inherent limitations of voice recognition software  Read the chart carefully and recognize, using context, where substitutions have occurred        "

## 2023-05-21 NOTE — PROGRESS NOTES
[facility]  Progress Note  Name: Zakia Reynoso  MRN: [id number]  Unit/Bed#: -01 I Date of Admission: 5/13/2023   Date of Service: 5/21/2023 I Hospital Day: 8    Assessment/Plan   Pre-operative clearance  Assessment & Plan  · Patient denies shortness of breath or chest pain prior to fall  · Echocardiogram-EF 55% and wall motion normal; aortic valve mild to moderate regurgitation  · EKG-normal sinus rhythm  · Patient with no postop complications    Abnormal CT of the abdomen  Assessment & Plan  · CT revealed perivesical fat stranding, suggestive of cystitis and patchy opacities at the lung bases, which may represent atelectasis versus pneumonia  · No leukocytosis or fevers   · UA negative   · Monitor off antibiotics    Aortic aneurysm 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 also mildly enlarged from 2 0 cm in April 2020  · ED discussed with vascular surgery  Type 2 endoleak  No urgent intervention is necessary at this time  · We will hold off on anticoagulation for now given possibility of surgical intervention for hip repair  Consider further discussion with vascular surgery if anticoagulation is warranted, and risk versus benefit in this patient with history of dementia/falls  · Did discuss with vascular surgery and they do recommend outpatient follow-up; will give referral on discharge  CAD (coronary artery disease)  Assessment & Plan  · Unknown as to whether patient has had cardiac stents in the past as patient is unsure and son is unsure  · Echocardiogram noted      * Closed displaced fracture of left acetabulum [facility])  Assessment & Plan  Presents with L hip pain   · CT LE: Acute , mildly displaced, comminuted fracture of the LEFT acetabulum involving the anterior and posterior columns  ·  ORIF L transverse acetabulum fracture 5/15  · PT recommending short term rehab  Family was requesting acute rehab  We will follow-up with case management today  · AM  · Patient does not seem to be a threat to himself or anyone else  · Consulted psych, who also has low suspicion for SI, but patient was in mild distress yesterday so will re evaluate today   · Does have dementia, and unlikely truly a threat to himself or others,  · Stable for discharge           VTE Pharmacologic Prophylaxis:   Pharmacologic: Enoxaparin (Lovenox)  Mechanical VTE Prophylaxis in Place: Yes    Patient Centered Rounds: I have performed bedside rounds with nursing staff today  Education and Discussions with Family / Patient: Discussed with the son yesterday  Aware about waiting for insurance authorization  Son wanted to have an [facility] referral    Time Spent for Care: 20 minutes  More than 50% of total time spent on counseling and coordination of care as described above  Current Length of Stay: 8 day(s)    Current Patient Status: Inpatient   Certification Statement: The patient will continue to require additional inpatient hospital stay due to [address] rehab and insurance authorization    Discharge Plan: Hopeful discharge in next 24 hours  Patient is medically stable but awaiting insurance authorization    Code Status: Level 1 - Full Code      Subjective:   Patient seen and examined  He is doing okay today  No complaints    Objective:     Vitals:   Temp (24hrs), Av 2 °F (36 8 °C), Min:98 2 °F (36 8 °C), Max:98 2 °F (36 8 °C)    Temp:  [98 2 °F (36 8 °C)] 98 2 °F (36 8 °C)  Resp:  [18] 18  BP: (127-157)/(61-80) 127/61  Body mass index is 18 79 kg/m²  Input and Output Summary (last 24 hours):        Intake/Output Summary (Last 24 hours) at 2023 0819  Last data filed at 2023 0601  Gross per 24 hour   Intake -- Output 650 ml   Net -650 ml       Physical Exam:     Physical Exam  (   General Appearance:    Alert, cooperative, no distress, appears stated age                               Lungs:     Clear to auscultation bilaterally, respirations unlabored       Heart:    Regular rate and rhythm, S1 and S2 normal, no murmur, rub    or gallop   Abdomen:     Soft, non-tender, bowel sounds active all four quadrants,     no masses, no organomegaly           Extremities:   Extremities normal, atraumatic, no cyanosis or edema       Additional Data:     Labs:    Results from last 7 days   Lab Units 05/21/23  0439   WBC Thousand/uL 7 31   HEMOGLOBIN g/dL 10 9*   HEMATOCRIT % 32 9*   PLATELETS Thousands/uL 303   NEUTROS PCT % 65   LYMPHS PCT % 21   MONOS PCT % 9   EOS PCT % 2     Results from last 7 days   Lab Units 05/21/23  0439   SODIUM mmol/L 136   POTASSIUM mmol/L 4 4   CHLORIDE mmol/L 108   CO2 mmol/L 23   BUN mg/dL 24   CREATININE mg/dL 0 97   ANION GAP mmol/L 5   CALCIUM mg/dL 9 5   GLUCOSE RANDOM mg/dL 94                           * I Have Reviewed All Lab Data Listed Above  * Additional Pertinent Lab Tests Reviewed:  All [facility] Admission Reviewed        Recent Cultures (last 7 days):           Last 24 Hours Medication List:   Current Facility-Administered Medications   Medication Dose Route Frequency Provider Last Rate   • acetaminophen  975 mg Oral Q8H Selwyn Lynn PA-C     • aspirin  81 mg Oral Daily Elza Maravilla PA-C     • bisacodyl  5 mg Oral Daily PRN Estrella Elder MD     • docusate sodium  100 mg Oral BID Estrella Elder MD     • enoxaparin  40 mg Subcutaneous Q24H [address] Vern Kumar PA-C     • lidocaine  1 patch Topical Daily PRN Elza Maravilla PA-C     • naloxone  0 04 mg Intravenous Q1MIN PRN Elza Maravilla PA-C     • niacin  500 mg Oral Daily With Porfirio Kumar PA-C     • nicotine  1 patch Transdermal Daily Elza Maravilla PA-C     • oxyCODONE  2 5 mg Oral Q6H PRN Krista Valverde PA-C     • polyethylene glycol  17 g Oral Daily PRN Krista Valverde PA-C     • polyethylene glycol  17 g Oral Daily Kiya Godinez MD     • senna  1 tablet Oral HS Kiya Godinez MD          Today, Patient Was Seen By: Jose Portillo MD    ** Please Note: Dictation voice to text software may have been used in the creation of this document   **

## 2023-05-22 PROBLEM — G31.84 MCI (MILD COGNITIVE IMPAIRMENT) WITH MEMORY LOSS: Status: ACTIVE | Noted: 2023-05-22

## 2023-05-22 RX ADMIN — ACETAMINOPHEN 975 MG: 325 TABLET, FILM COATED ORAL at 02:09

## 2023-05-22 RX ADMIN — ASPIRIN 81 MG: 81 TABLET, CHEWABLE ORAL at 10:00

## 2023-05-22 RX ADMIN — DOCUSATE SODIUM 100 MG: 100 CAPSULE, LIQUID FILLED ORAL at 10:00

## 2023-05-22 RX ADMIN — ENOXAPARIN SODIUM 40 MG: 40 INJECTION SUBCUTANEOUS at 02:09

## 2023-05-22 RX ADMIN — SENNOSIDES 8.6 MG: 8.6 TABLET, FILM COATED ORAL at 21:59

## 2023-05-22 RX ADMIN — DOCUSATE SODIUM 100 MG: 100 CAPSULE, LIQUID FILLED ORAL at 17:21

## 2023-05-22 RX ADMIN — Medication 500 MG: at 17:22

## 2023-05-22 RX ADMIN — OXYCODONE HYDROCHLORIDE 5 MG: 5 TABLET ORAL at 00:53

## 2023-05-22 RX ADMIN — NICOTINE 1 PATCH: 14 PATCH, EXTENDED RELEASE TRANSDERMAL at 10:00

## 2023-05-22 NOTE — PHYSICAL THERAPY NOTE
PHYSICAL THERAPY TREATMENT  NAME:  Kennedi Gee  DATE: 05/22/23    AGE:   80 y o    Mrn:   [id number]  ADMIT DX:  Pain [R52]  Left acetabular fracture ([facility] [address] ) [S32 402A]  History of dementia [Z86 59]  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]  Problem List:   Patient Active Problem List   Diagnosis    Odontoid fracture with type II morphology ([facility] [address] )    Multiple abrasions    Nasal bones, closed fracture    Atelectasis    Pulmonary nodule    Renal cyst    Liver cyst    Delirium    Fall    Bladder cancer [facility])    CAD (coronary artery disease)    H/O heart artery stent    Umbilical hernia    Positive blood culture    Closed displaced fracture of left acetabulum [facility])    Aortic aneurysm (HCC)    Abnormal CT of the abdomen    Pre-operative clearance       Past Medical History  Past Medical History:   Diagnosis Date    AMS (altered mental status) 4/20/2022    Bladder cancer ([facility] )     CAD (coronary artery disease)     Dyslipidemia     History of heart artery stent     Umbilical hernia        Past Surgical History  Past Surgical History:   Procedure Laterality Date    BLADDER SURGERY      CORONARY ANGIOPLASTY WITH STENT PLACEMENT      ORIF PELVIS Left 5/15/2023    Procedure: Percutaneous fixation L acetabulum fracture;  Surgeon: Lance Sterling MD;  Location: [facility];  Service: Orthopedics    TRANSODONTOID FUSION N/A 5/4/2020    Procedure: Transodontoid Fusion;  Surgeon: Sneha Jenkins MD;  Location: [facility];  Service: Neurosurgery       Length Of Stay: 9  Performed at least 2 patient identifiers during session: Name, [place], and ID bracelet       05/22/23 0805   PT Last Visit   PT Visit Date 05/22/23   Note Type   Note Type Treatment   Pain Assessment   Pain Assessment Tool FLACC   Pain Rating: FLACC (Rest) - Face 0   Pain Rating: FLACC (Rest) - Legs 0   Pain Rating: FLACC (Rest) - Activity 0   Pain Rating: FLACC (Rest) - Cry 0   Pain Rating: FLACC (Rest) - Consolability 0   Score: FLACC (Rest) 0   Pain "Rating: FLACC (Activity) - Face 1   Pain Rating: FLACC (Activity) - Legs 1   Pain Rating: FLACC (Activity) - Activity 1   Pain Rating: FLACC (Activity) - Cry 1   Pain Rating: FLACC (Activity) - Consolability 1   Score: FLACC (Activity) 5   Restrictions/Precautions   Weight Bearing Precautions Per Order Yes   LLE Weight Bearing Per Order PWB  (50% foot flat weight bearing per ortho)   Other Precautions Bed Alarm; Chair Alarm;Cognitive; Fall Risk;WBS  (50% PWB LLE with foot flat)   General   Chart Reviewed Yes   Response to Previous Treatment Patient unable to report, no changes reported from family or staff   Family/Caregiver Present No   Cognition   Overall Cognitive Status Impaired   Arousal/Participation Alert; Responsive   Attention Attends with cues to redirect   Orientation Level Oriented to person;Oriented to place; Disoriented to time;Disoriented to situation   Memory Decreased recall of recent events;Decreased recall of precautions   Following Commands Follows one step commands inconsistently   Comments Pt agreeable to PT session   Subjective   Subjective \"ok, we can get up\"   Bed Mobility   Supine to Sit 3  Moderate assistance   Additional items Assist x 1;Bedrails; Increased time required;Verbal cues;LE management   Sit to Supine   (not tested as pt seated in bedside chair at end of session)   Additional Comments BP at start of session 124/78   Transfers   Sit to Stand 3  Moderate assistance   Additional items Assist x 1; Increased time required;Verbal cues   Stand to Sit 3  Moderate assistance   Additional items Assist x 1; Increased time required;Verbal cues   Stand pivot 3  Moderate assistance   Additional items Assist x 1; Increased time required;Verbal cues   Additional Comments Pt completed STS from EOB to RW x3 trials with mod verbal and tactile cues to maintain PWB status RLE  (RW used during transfers)   Balance   Static Sitting Fair +   Dynamic Sitting Fair   Static Standing Fair -   Dynamic Standing " Poor +   Endurance Deficit   Endurance Deficit Yes   Endurance Deficit Description decreased activity tolerance   Activity Tolerance   Activity Tolerance Patient tolerated treatment well   Nurse Made Aware RN roseann   Exercises   Knee AROM Long Arc Quad 20 reps; Sitting;AROM; Bilateral   Ankle Pumps 20 reps; Sitting;AROM; Bilateral   Assessment   Prognosis Good   Problem List Decreased strength;Decreased endurance; Impaired balance;Decreased range of motion;Decreased mobility; Decreased cognition;Decreased safety awareness;Orthopedic restrictions;Pain   Assessment Pt seen for PT treatment session this date, consisting of ther act focused on transfer training, safety considerations, and safety awareness with WB status and therapeutic exercise  Since previous session, pt has made good progress in terms of completing transfers with assist of 1 with use of RW and max verbal cues  Pt greeted supine in bed and agreeable to PT session; pt pleasantly confused throughout requiring mod verbal cues for safety and set up as well as verbal and tactile cues to maintain PWB on LLE; did appear with improved understanding and adherence to WB restrictions  Pt required modAx1 for sup > sit, STS, and SPT with RW  Pt tolerated seated TE well with occ cues to maintain attention to task  Pertinent barriers during this session include pain, cognitive status and awareness  Current goals and POC remain appropriate, pt continues to have rehab potential and is making good progress towards STGs  Pt prognosis for achieving goals is good, pending pt progress with hospitalization/medical status improvements, and indicated by supportive family/caregivers, previous response to intervention and responsive to cues/strategies  Pt limited d/t the presence of intractable pain, poor orientation and cognitive impairments  PT recommends post acute rehabilitation services upon discharge   Pt continues to be functioning below baseline level, and remains limited 2* factors listed above  PT will continue to see pt during current hospitalization in order to address the deficits listed above and provide interventions consistent w/ POC in effort to achieve STGs  Barriers to Discharge Inaccessible home environment;Decreased caregiver support   Goals   STG Expiration Date 05/26/23   Short Term Goal #1 Pts goals remain appropriate  Continue with plan of care   PT Treatment Day 4   Plan   Treatment/Interventions Functional transfer training; Therapeutic exercise; Endurance training;Patient/family training;Bed mobility;Gait training;Spoke to nursing;Continued evaluation   Progress Improving as expected   PT Frequency 4-6x/wk   Recommendation   PT Discharge Recommendation Post acute rehabilitation services   AM-PAC Basic Mobility Inpatient   Turning in Flat Bed Without Bedrails 2   Lying on Back to Sitting on Edge of Flat Bed Without Bedrails 2   Moving Bed to Chair 2   Standing Up From Chair Using Arms 2   Walk in Room 1   Climb 3-5 Stairs With Railing 1   Basic Mobility Inpatient Raw Score 10   Turning Head Towards Sound 3   Follow Simple Instructions 3   Low Function Basic Mobility Raw Score  16   Low Function Basic Mobility Standardized Score  25 72   Highest Level Of Mobility   -HL Goal 4: Move to chair/commode   -HL Achieved 4: Move to [facility] Provided Mobility training;Assistive device; Other  (PWB status)   Patient Reinforcement needed   End of Consult   Patient Position at End of Consult Bed/Chair alarm activated; All needs within reach; Bedside chair       Time In: 0805  Time Out:0830  Total Treatment Minutes: [address] 3, PT

## 2023-05-22 NOTE — PROGRESS NOTES
[address] [place] [address]  Progress Note  Name: Angela Norton  MRN: [id number]  Unit/Bed#: -01 I Date of Admission: 5/13/2023   Date of Service: 5/22/2023 I Hospital Day: 9    Assessment/Plan   * Closed displaced fracture of left acetabulum [facility])  Assessment & Plan  Background: History of CAD and aortic aneurysm presents to the hospital found to have left acetabular fracture  · Underwent ORIF and has been doing well  · Currently pain controlled  · Awaiting rehab placement    MCI (mild cognitive impairment) with memory loss  Assessment & Plan  · Cognitive decline lives with son  · Had reported some thoughts of ending life however he did not mean this  · Was seen by psychiatry and now stable off one-to-one observation  · Patient reported this was sudden frustration but he did not mean it  · We will reconsult psychiatry for clearance as requested by rehab facilities  Aortic aneurysm [facility])  Assessment & Plan  · History of aortic aneurysm status post EVAR with increasing aneurysm sac diameter  · Case was discussed by ED with vascular surgery for type II endoleak  · Follow-up with vascular as an outpatient  CAD (coronary artery disease)  Assessment & Plan  · CAD with possibility of stenting  No chest pain  · Continue aspirin  VTE Pharmacologic Prophylaxis: VTE Score: 8 High Risk (Score >/= 5) - Pharmacological DVT Prophylaxis Ordered: enoxaparin (Lovenox)  Sequential Compression Devices Ordered  Patient Centered Rounds: I have performed bedside rounds with nursing staff today  Discussions with Specialists or Other Care Team Provider: Case management    Education and Discussions with Family / Patient: Updated  (son) at bedside  Time Spent for Care:    This time was spent on one or more of the following: performing physical exam; counseling and coordination of care; obtaining or reviewing history; documenting in the medical record; reviewing/ordering tests, "medications or procedures; communicating with other healthcare professionals and discussing with patient's family/caregivers  Current Length of Stay: 9 day(s)  Current Patient Status: Inpatient   Certification Statement: The patient will continue to require additional inpatient hospital stay due to Awaiting placement  Discharge Plan: Medically stable awaiting placement    Code Status: Level 1 - Full Code      Subjective:   Patient seen and examined  No new complaints, sitting in the chair watching TV  Family at bedside and now at baseline mentation    Objective:   Vitals: Blood pressure 138/61, pulse 63, temperature 97 7 °F (36 5 °C), resp  rate 18, height 5' 10\" (1 778 m), weight 59 1 kg (130 lb 4 7 oz), SpO2 93 %  Intake/Output Summary (Last 24 hours) at 5/22/2023 1616  Last data filed at 5/22/2023 1300  Gross per 24 hour   Intake 480 ml   Output 1175 ml   Net -695 ml       Physical Exam  Vitals reviewed  Constitutional:       General: He is not in acute distress  HENT:      Head: Atraumatic  Cardiovascular:      Rate and Rhythm: Regular rhythm  Heart sounds: Normal heart sounds  Pulmonary:      Effort: Pulmonary effort is normal       Breath sounds: Decreased breath sounds present  No wheezing  Abdominal:      General: Bowel sounds are normal       Palpations: Abdomen is soft  Tenderness: There is no abdominal tenderness  There is no rebound  Musculoskeletal:         General: No swelling or tenderness  Skin:     General: Skin is warm and dry  Neurological:      Mental Status: He is alert  Mental status is at baseline  Cranial Nerves: No cranial nerve deficit  Motor: No weakness     Psychiatric:         Mood and Affect: Mood normal        Additional Data:   Labs:  Results from last 7 days   Lab Units 05/21/23  0439 05/20/23  0531 05/19/23  0521   WBC Thousand/uL 7 31 7 68 9 56   HEMOGLOBIN g/dL 10 9* 11 5* 11 4*   PLATELETS Thousands/uL 303 270 242   MCV fL 97 98 97 " Results from last 7 days   Lab Units 05/21/23  0439 05/20/23  0531 05/19/23  0521   SODIUM mmol/L 136 136 137   POTASSIUM mmol/L 4 4 4 2 4 2   CHLORIDE mmol/L 108 108 109*   CO2 mmol/L 23 24 24   ANION GAP mmol/L 5 4 4   BUN mg/dL 24 22 22   CREATININE mg/dL 0 97 0 97 1 00   CALCIUM mg/dL 9 5 9 9 9 9   EGFR ml/min/1 73sq m 69 69 67   GLUCOSE RANDOM mg/dL 94 96 97     Results from last 7 days   Lab Units 05/21/23  0439 05/20/23  0531 05/19/23  0521   MAGNESIUM mg/dL 2 4 2 3 2 4         * I Have Reviewed All Lab Data Listed Above  Cultures:                   Lines/Drains:  Invasive Devices     Peripheral Intravenous Line  Duration           Peripheral IV 05/20/23 Dorsal (posterior); Right Forearm 2 days              Telemetry:      Imaging:  Imaging Reports Reviewed Today Include:   XR chest portable    Result Date: 5/15/2023  Impression: Bibasilar density seen which may be due to atelectasis or infiltrate Increased lung markings may be due to mild congestion or hypoinflation Workstation performed: [id number]     XR pelvis complete 3+ vw    Result Date: 5/15/2023  Impression: Fluoroscopic guidance provided for procedure guidance  Please refer to the separate procedure notes for additional details   Workstation performed: [id number]     XR hip/pelv 2-3 vws left    Result Date: 5/14/2023  Impression: Posterior acetabular fracture Workstation performed: [id number]           Scheduled Meds:  Current Facility-Administered Medications   Medication Dose Route Frequency Provider Last Rate   • acetaminophen  975 mg Oral Q8H Cherrie Jeffers PA-C     • aspirin  81 mg Oral Daily Charlie Diop PA-C     • bisacodyl  5 mg Oral Daily PRN Walt Martínez MD     • docusate sodium  100 mg Oral BID Walt Martínez MD     • enoxaparin  40 mg Subcutaneous Q24H [facility] Vern Kumar PA-C     • lidocaine  1 patch Topical Daily PRN Farrel MOHSEN Diop     • naloxone  0 04 mg Intravenous Q1MIN PRN Shira Walker PA-C     • niacin  500 mg Oral Daily With Porfirio Kumar PA-C     • nicotine  1 patch Transdermal Daily Shira Walker PA-C     • oxyCODONE  5 mg Oral Q6H PRN Onelia Brandt MD     • polyethylene glycol  17 g Oral Daily PRN Shira Walker PA-C     • polyethylene glycol  17 g Oral Daily Maricel Steele MD     • senna  1 tablet Oral HS Maricel Steele MD         Today, Patient Was Seen By: Iker Watson DO    ** Please Note: Dictation voice to text software may have been used in the creation of this document   **

## 2023-05-22 NOTE — ASSESSMENT & PLAN NOTE
Background: History of CAD and aortic aneurysm presents to the hospital found to have left acetabular fracture  · Underwent ORIF and has been doing well  · Currently pain controlled    · Awaiting rehab placement

## 2023-05-22 NOTE — PLAN OF CARE
Problem: PHYSICAL THERAPY ADULT  Goal: Performs mobility at highest level of function for planned discharge setting  See evaluation for individualized goals  Description: Treatment/Interventions: Functional transfer training, LE strengthening/ROM, Therapeutic exercise, Endurance training, Cognitive reorientation, Patient/family training, Bed mobility, Gait training, Spoke to nursing, Spoke to advanced practitioner, OT          See flowsheet documentation for full assessment, interventions and recommendations  Outcome: Progressing  Note: Prognosis: Good  Problem List: Decreased strength, Decreased endurance, Impaired balance, Decreased range of motion, Decreased mobility, Decreased cognition, Decreased safety awareness, Orthopedic restrictions, Pain  Assessment: Pt seen for PT treatment session this date, consisting of ther act focused on transfer training, safety considerations, and safety awareness with WB status and therapeutic exercise  Since previous session, pt has made good progress in terms of completing transfers with assist of 1 with use of RW and max verbal cues  Pt greeted supine in bed and agreeable to PT session; pt pleasantly confused throughout requiring mod verbal cues for safety and set up as well as verbal and tactile cues to maintain PWB on LLE; did appear with improved understanding and adherence to WB restrictions  Pt required modAx1 for sup > sit, STS, and SPT with RW  Pt tolerated seated TE well with occ cues to maintain attention to task  Pertinent barriers during this session include pain, cognitive status and awareness  Current goals and POC remain appropriate, pt continues to have rehab potential and is making good progress towards STGs  Pt prognosis for achieving goals is good, pending pt progress with hospitalization/medical status improvements, and indicated by supportive family/caregivers, previous response to intervention and responsive to cues/strategies   Pt limited d/t the presence of intractable pain, poor orientation and cognitive impairments  PT recommends post acute rehabilitation services upon discharge  Pt continues to be functioning below baseline level, and remains limited 2* factors listed above  PT will continue to see pt during current hospitalization in order to address the deficits listed above and provide interventions consistent w/ POC in effort to achieve STGs  Barriers to Discharge: Inaccessible home environment, Decreased caregiver support     PT Discharge Recommendation: Post acute rehabilitation services    See flowsheet documentation for full assessment        Adelita Reagan; PT, DPT

## 2023-05-22 NOTE — QUICK NOTE
Patient refusing masimo monitoring at this time  Patient has been educated on masimo function and its' importance, however, patient continued to refuse masimo placement  At this time, vitals are WNL, no outward signs of distress, bed in lowest setting, and call bell within reach  Will continue with patient's current plan of care    Kenneth Kemp RN

## 2023-05-22 NOTE — ASSESSMENT & PLAN NOTE
· History of aortic aneurysm status post EVAR with increasing aneurysm sac diameter  · Case was discussed by ED with vascular surgery for type II endoleak  · Follow-up with vascular as an outpatient

## 2023-05-22 NOTE — CASE MANAGEMENT
Case Management Discharge Planning Note    Patient name Araseli Head  Location [address] [id number] MRN [id number]  : 1935 Date 2023       Current Admission Date: 2023  Current Admission Diagnosis:Closed displaced fracture of left acetabulum [facility])   Patient Active Problem List    Diagnosis Date Noted   • Pre-operative clearance 2023   • Closed displaced fracture of left acetabulum ([facility] [address] ) 2023   • Aortic aneurysm ([facility] [address] ) 2023   • Abnormal CT of the abdomen 2023   • Positive blood culture 2022   • Delirium 2020   • Fall 2020   • Bladder cancer ([facility] [address] ) 2020   • CAD (coronary artery disease) 2020   • H/O heart artery stent    • Umbilical hernia    • Odontoid fracture with type II morphology ([facility] ) 2020   • Multiple abrasions 2020   • Nasal bones, closed fracture 2020   • Atelectasis 2020   • Pulmonary nodule 2020   • Renal cyst 2020   • Liver cyst 2020      LOS (days): 9  Geometric Mean LOS (GMLOS) (days): 3 80  Days to GMLOS:-5     OBJECTIVE:  Risk of Unplanned Readmission Score: 13 36      Current admission status: Inpatient   Preferred Pharmacy:   [address] [place] [id number]  Phone: [phone] Fax: [phone]    Primary Care Provider: Jennifer Redman PA-C    Primary Insurance: [facility]  Secondary Insurance:     DISCHARGE DETAILS:  CM reviewed placement referral status  Dimas Pereira is reviewing, updated PT/OT & psych consult are needed  [facility] is not able to accept  [phone]   Awaiting return call after review  Patient has two accepting STR providers at this time, neither of which are local   CM re-opened referral and extended deadline to allow for additional providers  CM updated SLIM and nurse    Unable to meet with family at bedside at this time  SLIM requesting psych follow-up for documentation  PT saw patient today  CM will follow up on OT status as well

## 2023-05-22 NOTE — ASSESSMENT & PLAN NOTE
· Cognitive decline lives with son  · Had reported some thoughts of ending life however he did not mean this  · Was seen by psychiatry and now stable off one-to-one observation  · Patient reported this was sudden frustration but he did not mean it  · We will reconsult psychiatry for clearance as requested by rehab facilities

## 2023-05-23 LAB
ANION GAP SERPL CALCULATED.3IONS-SCNC: 5 MMOL/L (ref 4–13)
BUN SERPL-MCNC: 29 MG/DL (ref 5–25)
CALCIUM SERPL-MCNC: 9.6 MG/DL (ref 8.4–10.2)
CHLORIDE SERPL-SCNC: 106 MMOL/L (ref 96–108)
CO2 SERPL-SCNC: 23 MMOL/L (ref 21–32)
CREAT SERPL-MCNC: 1.06 MG/DL (ref 0.6–1.3)
ERYTHROCYTE [DISTWIDTH] IN BLOOD BY AUTOMATED COUNT: 14.7 % (ref 11.6–15.1)
GFR SERPL CREATININE-BSD FRML MDRD: 62 ML/MIN/1.73SQ M
GLUCOSE SERPL-MCNC: 99 MG/DL (ref 65–140)
HCT VFR BLD AUTO: 32.6 % (ref 36.5–49.3)
HGB BLD-MCNC: 10.5 G/DL (ref 12–17)
MCH RBC QN AUTO: 32 PG (ref 26.8–34.3)
MCHC RBC AUTO-ENTMCNC: 32.2 G/DL (ref 31.4–37.4)
MCV RBC AUTO: 99 FL (ref 82–98)
PLATELET # BLD AUTO: 371 THOUSANDS/UL (ref 149–390)
PMV BLD AUTO: 9.2 FL (ref 8.9–12.7)
POTASSIUM SERPL-SCNC: 4.6 MMOL/L (ref 3.5–5.3)
RBC # BLD AUTO: 3.28 MILLION/UL (ref 3.88–5.62)
SODIUM SERPL-SCNC: 134 MMOL/L (ref 135–147)
WBC # BLD AUTO: 10.11 THOUSAND/UL (ref 4.31–10.16)

## 2023-05-23 RX ADMIN — Medication 500 MG: at 16:51

## 2023-05-23 RX ADMIN — ENOXAPARIN SODIUM 40 MG: 40 INJECTION SUBCUTANEOUS at 02:09

## 2023-05-23 RX ADMIN — OXYCODONE HYDROCHLORIDE 5 MG: 5 TABLET ORAL at 17:47

## 2023-05-23 RX ADMIN — NICOTINE 1 PATCH: 14 PATCH, EXTENDED RELEASE TRANSDERMAL at 10:12

## 2023-05-23 RX ADMIN — ACETAMINOPHEN 975 MG: 325 TABLET, FILM COATED ORAL at 23:48

## 2023-05-23 RX ADMIN — DOCUSATE SODIUM 100 MG: 100 CAPSULE, LIQUID FILLED ORAL at 10:11

## 2023-05-23 RX ADMIN — SENNOSIDES 8.6 MG: 8.6 TABLET, FILM COATED ORAL at 23:48

## 2023-05-23 RX ADMIN — DOCUSATE SODIUM 100 MG: 100 CAPSULE, LIQUID FILLED ORAL at 17:46

## 2023-05-23 RX ADMIN — POLYETHYLENE GLYCOL 3350 17 G: 17 POWDER, FOR SOLUTION ORAL at 10:11

## 2023-05-23 RX ADMIN — ASPIRIN 81 MG: 81 TABLET, CHEWABLE ORAL at 10:11

## 2023-05-23 RX ADMIN — ACETAMINOPHEN 975 MG: 325 TABLET, FILM COATED ORAL at 02:09

## 2023-05-23 NOTE — PROGRESS NOTES
[facility]  Progress Note  Name: Oscar Riley  MRN: [id number]  Unit/Bed#: -01 I Date of Admission: 5/13/2023   Date of Service: 5/23/2023 I Hospital Day: 10    Assessment/Plan   MCI (mild cognitive impairment) with memory loss  Assessment & Plan  · Cognitive decline lives with son  · Had reported some thoughts of ending life however he did not mean this  · Was seen by psychiatry and now stable off one-to-one observation  · Patient reported this was sudden frustration but he did not mean it  · We will reconsult psychiatry for clearance as requested by rehab facilities  Pre-operative clearance  Assessment & Plan  · Patient denies shortness of breath or chest pain prior to fall  · Echocardiogram-EF 55% and wall motion normal; aortic valve mild to moderate regurgitation  · EKG-normal sinus rhythm  · Patient with no postop complications    Abnormal CT of the abdomen  Assessment & Plan  · CT revealed perivesical fat stranding, suggestive of cystitis and patchy opacities at the lung bases, which may represent atelectasis versus pneumonia  · No leukocytosis or fevers   · UA negative   · Monitor off antibiotics    Aortic aneurysm (HCC)  Assessment & Plan  · History of aortic aneurysm status post EVAR with increasing aneurysm sac diameter  · Case was discussed by ED with vascular surgery for type II endoleak  · Follow-up with vascular as an outpatient  CAD (coronary artery disease)  Assessment & Plan  · CAD with possibility of stenting  No chest pain  · Continue aspirin  ·     * Closed displaced fracture of left acetabulum [facility])  Assessment & Plan  Background: History of CAD and aortic aneurysm presents to the hospital found to have left acetabular fracture  · Underwent ORIF and has been doing well  · Currently pain controlled    · Awaiting rehab placement  · Psychiatric consultation pending           VTE Pharmacologic Prophylaxis:   Pharmacologic: Enoxaparin (Lovenox)  Mechanical VTE Prophylaxis in Place: Yes    Patient Centered Rounds: I have performed bedside rounds with nursing staff today  Education and Discussions with Family / Patient: We will discuss with the son    Time Spent for Care: 20 minutes  More than 50% of total time spent on counseling and coordination of care as described above  Current Length of Stay: 10 day(s)    Current Patient Status: Inpatient   Certification Statement: The patient will continue to require additional inpatient hospital stay due to Needing placement    Discharge Plan: Is medically stable but awaiting placement    Code Status: Level 1 - Full Code      Subjective:   Patient seen and examined  No acute events reported    Objective:     Vitals:   Temp (24hrs), Av 2 °F (36 8 °C), Min:97 7 °F (36 5 °C), Max:98 7 °F (37 1 °C)    Temp:  [97 7 °F (36 5 °C)-98 7 °F (37 1 °C)] 98 7 °F (37 1 °C)  Resp:  [16-18] 16  BP: (135-138)/(61-62) 135/62  Body mass index is 19 01 kg/m²  Input and Output Summary (last 24 hours):        Intake/Output Summary (Last 24 hours) at 2023 1101  Last data filed at 2023 0540  Gross per 24 hour   Intake 480 ml   Output 450 ml   Net 30 ml       Physical Exam:     Physical Exam  (   General Appearance:    Alert, cooperative, no distress, appears stated age                               Lungs:     Clear to auscultation bilaterally, respirations unlabored       Heart:    Regular rate and rhythm, S1 and S2 normal, no murmur, rub    or gallop   Abdomen:     Soft, non-tender, bowel sounds active all four quadrants,     no masses, no organomegaly           Extremities:   Extremities normal, atraumatic, no cyanosis or edema       Additional Data:     Labs:    Results from last 7 days   Lab Units 23  0454 23  0439   WBC Thousand/uL 10 11 7 31   HEMOGLOBIN g/dL 10 5* 10 9*   HEMATOCRIT % 32 6* 32 9*   PLATELETS Thousands/uL 371 303   NEUTROS PCT %  --  65   LYMPHS PCT %  --  21   MONOS PCT %  --  9   EOS PCT %  --  2     Results from last 7 days   Lab Units 05/23/23  0454   SODIUM mmol/L 134*   POTASSIUM mmol/L 4 6   CHLORIDE mmol/L 106   CO2 mmol/L 23   BUN mg/dL 29*   CREATININE mg/dL 1 06   ANION GAP mmol/L 5   CALCIUM mg/dL 9 6   GLUCOSE RANDOM mg/dL 99                           * I Have Reviewed All Lab Data Listed Above  * Additional Pertinent Lab Tests Reviewed: [facility] Admission Reviewed    Recent Cultures (last 7 days):           Last 24 Hours Medication List:   Current Facility-Administered Medications   Medication Dose Route Frequency Provider Last Rate   • acetaminophen  975 mg Oral Q8H Armin Marroquin PA-C     • aspirin  81 mg Oral Daily Krista Valverde PA-C     • bisacodyl  5 mg Oral Daily PRN Kiya Godinez MD     • docusate sodium  100 mg Oral BID Kiya Godinez MD     • enoxaparin  40 mg Subcutaneous Q24H [facility] Vern Kumar PA-C     • lidocaine  1 patch Topical Daily PRN Krista Valverde PA-C     • naloxone  0 04 mg Intravenous Q1MIN PRN Krista Valverde PA-C     • niacin  500 mg Oral Daily With Porfirio Kumar PA-C     • nicotine  1 patch Transdermal Daily Krista Valverde PA-C     • oxyCODONE  5 mg Oral Q6H PRN Gavin Galeano MD     • polyethylene glycol  17 g Oral Daily PRN Krista Valverde PA-C     • polyethylene glycol  17 g Oral Daily Kiya Godinez MD     • senna  1 tablet Oral HS Kiya Godinez MD          Today, Patient Was Seen By: Jose Portillo MD    ** Please Note: Dictation voice to text software may have been used in the creation of this document   **

## 2023-05-23 NOTE — PLAN OF CARE
Problem: PHYSICAL THERAPY ADULT  Goal: Performs mobility at highest level of function for planned discharge setting  See evaluation for individualized goals  Description: Treatment/Interventions: Functional transfer training, LE strengthening/ROM, Therapeutic exercise, Endurance training, Cognitive reorientation, Patient/family training, Bed mobility, Gait training, Spoke to nursing, Spoke to advanced practitioner, OT          See flowsheet documentation for full assessment, interventions and recommendations  5/23/2023 1227 by Estefania Bajwa PT  Outcome: Progressing  Note: Prognosis: Good  Problem List: Decreased strength, Decreased endurance, Impaired balance, Decreased range of motion, Decreased mobility, Decreased cognition, Decreased safety awareness, Orthopedic restrictions, Pain  Assessment: Pt seen for PT treatment session this date, consisting of ther act focused on bed mobility, sit to stand transfers from various surfaces, standing balance/posture and weight shifting & vc to maintain PWB and gt training on level surfaces to improve pt safety in household environment  Since previous session, pt has made good progress in terms of increased household distance gait trial with use of RW, min A x2 for all phases of mobility  Pertinent barriers during this session include pain  Current goals and POC remain appropriate, pt continues to have rehab potential and is making good progress towards STGs  Pt prognosis for achieving goals is good, pending pt progress with hospitalization/medical status improvements, and indicated by [facility] and ability to follow directions  Pt limited d/t fear of pain provocation and fear of falling  PT recommends post acute rehabilitation services upon discharge  Pt continues to be functioning below baseline level, and remains limited 2* factors listed above   PT will continue to see pt during current hospitalization in order to address the deficits listed above and provide interventions consistent w/ POC in effort to achieve STGs  Barriers to Discharge: Inaccessible home environment, Decreased caregiver support     PT Discharge Recommendation: Post acute rehabilitation services    See flowsheet documentation for full assessment  5/23/2023 1226 by Lorri Avila PT  Outcome: Progressing  Note: Prognosis: Good  Problem List: Decreased strength, Decreased endurance, Impaired balance, Decreased range of motion, Decreased mobility, Decreased cognition, Decreased safety awareness, Orthopedic restrictions, Pain  Assessment: Pt seen for PT treatment session this date, consisting of ther act focused on bed mobility, sit to stand transfers from various surfaces, standing balance/posture and weight shifting & vc to maintain PWB and gt training on level surfaces to improve pt safety in household environment  Since previous session, pt has made good progress in terms of increased household distance gait trial with use of RW, min A x2 for all phases of mobility  Pertinent barriers during this session include pain  Current goals and POC remain appropriate, pt continues to have rehab potential and is making good progress towards STGs  Pt prognosis for achieving goals is good, pending pt progress with hospitalization/medical status improvements, and indicated by [facility] and ability to follow directions  Pt limited d/t fear of pain provocation and fear of falling  PT recommends post acute rehabilitation services upon discharge  Pt continues to be functioning below baseline level, and remains limited 2* factors listed above  PT will continue to see pt during current hospitalization in order to address the deficits listed above and provide interventions consistent w/ POC in effort to achieve STGs    Barriers to Discharge: Inaccessible home environment, Decreased caregiver support     PT Discharge Recommendation: Post acute rehabilitation services    See flowsheet documentation for full assessment

## 2023-05-23 NOTE — PLAN OF CARE
Problem: Potential for Falls  Goal: Patient will remain free of falls  Description: INTERVENTIONS:  - Educate patient/family on patient safety including physical limitations  - Instruct patient to call for assistance with activity   - Consult OT/PT to assist with strengthening/mobility   - Keep Call bell within reach  - Keep bed low and locked with side rails adjusted as appropriate  - Keep care items and personal belongings within reach  - Initiate and maintain comfort rounds  - Make Fall Risk Sign visible to staff  - Offer Toileting every 2  Hours, in advance of need  - Initiate/Maintain bed alarm  - Obtain necessary fall risk management equipment  - Apply yellow socks and bracelet for high fall risk patients  - Consider moving patient to room near nurses station  Outcome: Progressing     Problem: MOBILITY - ADULT  Goal: Maintain or return to baseline ADL function  Description: INTERVENTIONS:  -  Assess patient's ability to carry out ADLs; assess patient's baseline for ADL function and identify physical deficits which impact ability to perform ADLs (bathing, care of mouth/teeth, toileting, grooming, dressing, etc )  - Assess/evaluate cause of self-care deficits   - Assess range of motion  - Assess patient's mobility; develop plan if impaired  - Assess patient's need for assistive devices and provide as appropriate  - Encourage maximum independence but intervene and supervise when necessary  - Involve family in performance of ADLs  - Assess for home care needs following discharge   - Consider OT consult to assist with ADL evaluation and planning for discharge  - Provide patient education as appropriate  Outcome: Progressing  Goal: Maintains/Returns to pre admission functional level  Description: INTERVENTIONS:  - Perform BMAT or MOVE assessment daily    - Set and communicate daily mobility goal to care team and patient/family/caregiver     - Collaborate with rehabilitation services on mobility goals if consulted  - Perform Range of Motion 3 times a day  - Reposition patient every 2 hours  - Dangle patient 3 times a day  - Stand patient 3 times a day  - Ambulate patient 3 times a day  - Out of bed to chair 3 times a day   - Out of bed for meals 3 times a day  - Out of bed for toileting  - Record patient progress and toleration of activity level   Outcome: Progressing     Problem: Prexisting or High Potential for Compromised Skin Integrity  Goal: Skin integrity is maintained or improved  Description: INTERVENTIONS:  - Identify patients at risk for skin breakdown  - Assess and monitor skin integrity  - Assess and monitor nutrition and hydration status  - Monitor labs   - Assess for incontinence   - Turn and reposition patient  - Assist with mobility/ambulation  - Relieve pressure over bony prominences  - Avoid friction and shearing  - Provide appropriate hygiene as needed including keeping skin clean and dry  - Evaluate need for skin moisturizer/barrier cream  - Collaborate with interdisciplinary team   - Patient/family teaching  - Consider wound care consult   Outcome: Progressing     Problem: Nutrition/Hydration-ADULT  Goal: Nutrient/Hydration intake appropriate for improving, restoring or maintaining nutritional needs  Description: Monitor and assess patient's nutrition/hydration status for malnutrition  Collaborate with interdisciplinary team and initiate plan and interventions as ordered  Monitor patient's weight and dietary intake as ordered or per policy  Utilize nutrition screening tool and intervene as necessary  Determine patient's food preferences and provide high-protein, high-caloric foods as appropriate       INTERVENTIONS:  - Monitor oral intake, urinary output, labs, and treatment plans  - Assess nutrition and hydration status and recommend course of action  - Evaluate amount of meals eaten  - Assist patient with eating if necessary   - Allow adequate time for meals  - Recommend/ encourage appropriate diets, oral nutritional supplements, and vitamin/mineral supplements  - Order, calculate, and assess calorie counts as needed  - Recommend, monitor, and adjust tube feedings and TPN/PPN based on assessed needs  - Assess need for intravenous fluids  - Provide specific nutrition/hydration education as appropriate  - Include patient/family/caregiver in decisions related to nutrition  Outcome: Progressing

## 2023-05-23 NOTE — PLAN OF CARE
Problem: PHYSICAL THERAPY ADULT  Goal: Performs mobility at highest level of function for planned discharge setting  See evaluation for individualized goals  Description: Treatment/Interventions: Functional transfer training, LE strengthening/ROM, Therapeutic exercise, Endurance training, Cognitive reorientation, Patient/family training, Bed mobility, Gait training, Spoke to nursing, Spoke to advanced practitioner, OT          See flowsheet documentation for full assessment, interventions and recommendations  Outcome: Progressing  Note: Prognosis: Good  Problem List: Decreased strength, Decreased endurance, Impaired balance, Decreased range of motion, Decreased mobility, Decreased cognition, Decreased safety awareness, Orthopedic restrictions, Pain  Assessment: Pt seen for PT treatment session this date, consisting of ther act focused on bed mobility, sit to stand transfers from various surfaces, standing balance/posture and weight shifting & vc to maintain PWB and gt training on level surfaces to improve pt safety in household environment  Since previous session, pt has made good progress in terms of increased household distance gait trial with use of RW, min A x2 for all phases of mobility  Pertinent barriers during this session include pain  Current goals and POC remain appropriate, pt continues to have rehab potential and is making good progress towards STGs  Pt prognosis for achieving goals is good, pending pt progress with hospitalization/medical status improvements, and indicated by [facility] and ability to follow directions  Pt limited d/t fear of pain provocation and fear of falling  PT recommends post acute rehabilitation services upon discharge  Pt continues to be functioning below baseline level, and remains limited 2* factors listed above   PT will continue to see pt during current hospitalization in order to address the deficits listed above and provide interventions consistent w/ POC in effort to achieve STGs  Barriers to Discharge: Inaccessible home environment, Decreased caregiver support     PT Discharge Recommendation: Post acute rehabilitation services    See flowsheet documentation for full assessment

## 2023-05-23 NOTE — PHYSICAL THERAPY NOTE
"Physical Therapy Treatment Note       05/23/23 0820   PT Last Visit   PT Visit Date 05/23/23   Note Type   Note Type Treatment   Pain Assessment   Pain Assessment Tool 0-10   Pain Score 9  (none at rest, 9/10 reported post mobility)   Pain Location/Orientation Orientation: Left; Location: Hip   Hospital Pain Intervention(s) Repositioned; Ambulation/increased activity; Emotional support  (RN made aware of pt's pain report)   Pain Rating: FLACC (Rest) - Face 0   Pain Rating: FLACC (Rest) - Legs 0   Pain Rating: FLACC (Rest) - Activity 0   Pain Rating: FLACC (Rest) - Cry 0   Pain Rating: FLACC (Rest) - Consolability 0   Score: FLACC (Rest) 0   Pain Rating: FLACC (Activity) - Face 1   Pain Rating: FLACC (Activity) - Legs 1   Pain Rating: FLACC (Activity) - Activity 1   Pain Rating: FLACC (Activity) - Cry 1   Pain Rating: FLACC (Activity) - Consolability 1   Score: FLACC (Activity) 5   Restrictions/Precautions   Weight Bearing Precautions Per Order Yes   LLE Weight Bearing Per Order PWB  (Toe touch weightbearing/50% flat foot weightbearing Left lower extremity with use of walker for safe ambulation)   Other Precautions Cognitive; Chair Alarm; Bed Alarm;WBS;Fall Risk;Pain   General   Chart Reviewed Yes   Response to Previous Treatment Patient with no complaints from previous session  Family/Caregiver Present No   Cognition   Overall Cognitive Status Impaired   Arousal/Participation Alert; Responsive   Attention Attends with cues to redirect   Orientation Level Oriented to person;Oriented to place; Disoriented to time;Disoriented to situation   Memory Decreased recall of recent events;Decreased recall of precautions   Following Commands Follows one step commands inconsistently   Comments pt agreeable to PT session, education required on WBS   Subjective   Subjective \"I can get up\"   Bed Mobility   Supine to Sit 4  Minimal assistance   Additional items Assist x 2;HOB elevated; Bedrails; Increased time required;Verbal cues;LE " management   Transfers   Sit to Stand 4  Minimal assistance   Additional items Assist x 2;Armrests; Increased time required;Verbal cues   Stand to Sit 4  Minimal assistance   Additional items Assist x 2;Armrests; Increased time required;Verbal cues   Toilet transfer 4  Minimal assistance   Additional items Assist x 2;Armrests; Increased time required;Verbal cues; Commode   Additional Comments vc for appropriate hand/foot placement, proper body mechanics, and maintaining WBS t/o all transitional movements   Ambulation/Elevation   Gait pattern Decreased toe off;Decreased heel strike;Decreased hip extension; Excessively slow; Step to;Short stride; Shuffling;Decreased L stance; Antalgic; Improper Weight shift   Gait Assistance 4  Minimal assist   Additional items Assist x 2;Verbal cues; Tactile cues   Assistive Device Rolling walker   Distance 12' + 10'   Ambulation/Elevation Additional Comments Pt requires max verbal and tactile cues to maintain PWB on left lower extremity  Balance   Static Sitting Fair +   Dynamic Sitting Fair   Static Standing Fair -   Dynamic Standing Poor +   Ambulatory Poor   Endurance Deficit   Endurance Deficit Yes   Activity Tolerance   Activity Tolerance Patient limited by pain   Medical Staff Made Aware RICO Patel   Nurse Made Aware RN Dyna   Exercises   Knee AROM Long Arc Quad Sitting;10 reps;AROM; Bilateral   Ankle Pumps Sitting;10 reps;AROM; Bilateral   Assessment   Prognosis Good   Problem List Decreased strength;Decreased endurance; Impaired balance;Decreased range of motion;Decreased mobility; Decreased cognition;Decreased safety awareness;Orthopedic restrictions;Pain   Assessment Pt seen for PT treatment session this date, consisting of ther act focused on bed mobility, sit to stand transfers from various surfaces, standing balance/posture and weight shifting & vc to maintain PWB and gt training on level surfaces to improve pt safety in household environment   Since previous session, pt has made good progress in terms of increased household distance gait trial with use of RW, min A x2 for all phases of mobility  Pertinent barriers during this session include pain  Current goals and POC remain appropriate, pt continues to have rehab potential and is making good progress towards STGs  Pt prognosis for achieving goals is good, pending pt progress with hospitalization/medical status improvements, and indicated by [facility] and ability to follow directions  Pt limited d/t fear of pain provocation and fear of falling  PT recommends post acute rehabilitation services upon discharge  Pt continues to be functioning below baseline level, and remains limited 2* factors listed above  PT will continue to see pt during current hospitalization in order to address the deficits listed above and provide interventions consistent w/ POC in effort to achieve STGs  Barriers to Discharge Inaccessible home environment;Decreased caregiver support   Goals   STG Expiration Date 05/26/23   PT Treatment Day 5   Plan   Treatment/Interventions Functional transfer training;LE strengthening/ROM; Therapeutic exercise; Endurance training;Cognitive reorientation;Patient/family training;Bed mobility;Gait training;Spoke to nursing;OT;Equipment eval/education   Progress Progressing toward goals   PT Frequency 4-6x/wk   Recommendation   PT Discharge Recommendation Post acute rehabilitation services   AM-PAC Basic Mobility Inpatient   Turning in Flat Bed Without Bedrails 2   Lying on Back to Sitting on Edge of Flat Bed Without Bedrails 2   Moving Bed to Chair 2   Standing Up From Chair Using Arms 2   Walk in Room 2   Climb 3-5 Stairs With Railing 1   Basic Mobility Inpatient Raw Score 11   Basic Mobility Standardized Score 30 25   Highest Level Of Mobility   JH-HLM Goal 4: Move to chair/commode   JH-HLM Achieved 6: Walk 10 steps or more   Education   Education Provided Mobility training;Home exercise program;Assistive device  (LLE PWB) Patient Reinforcement needed   End of Consult   Patient Position at End of Consult Bedside chair;Bed/Chair alarm activated; All needs within reach       [place] Margarita, PT, DPT

## 2023-05-23 NOTE — ASSESSMENT & PLAN NOTE
Background: History of CAD and aortic aneurysm presents to the hospital found to have left acetabular fracture  · Underwent ORIF and has been doing well  · Currently pain controlled    · Awaiting rehab placement  · Psychiatric consultation pending

## 2023-05-23 NOTE — TELEMEDICINE
TeleConsultation - [address] 80 y o  male MRN: [id number]  Unit/Bed#: -01 Encounter: [id number]        REQUIRED DOCUMENTATION:     1  This service was provided via Telemedicine  2  Provider located at [facility]   3  TeleMed provider: Allegra Ortiz MD   4  Identify all parties in room with patient during tele consult:  pt  5  Patient was then informed that this was a Telemedicine visit and that the exam was being conducted confidentially over secure lines  My office door was closed  No one else was in the room  Patient acknowledged consent and understanding of privacy and security of the Telemedicine visit, and gave us permission to have the assistant stay in the room in order to assist with the history and to conduct the exam   I informed the patient that I have reviewed their record in Epic and presented the opportunity for them to ask any questions regarding the visit today  The patient agreed to participate  Assessment/Plan     Present on Admission:  • CAD (coronary artery disease)  • MCI (mild cognitive impairment) with memory loss    Assessment:    Likely encephalopathy/Delirium now resolved superimposed on baseline dementia unspecified    Treatment Plan:    There is no psychiatric contraindication for rehab placement  No suicide precautions are indicated at this time  No medication changes are recommended at this time  Reconsult psychiatry as needed      Current Medications:     Current Facility-Administered Medications   Medication Dose Route Frequency Provider Last Rate   • acetaminophen  975 mg Oral Q8H [facility] Vern Kumar PA-C     • aspirin  81 mg Oral Daily Perla Suarez PA-C     • bisacodyl  5 mg Oral Daily PRN Teto Cardoza MD     • docusate sodium  100 mg Oral BID Teto Cardoza MD     • enoxaparin  40 mg Subcutaneous Q24H [facility] Vern Kumar PA-C     • lidocaine  1 patch Topical Daily PRN Perla Suarez PA-C     • naloxone  0 04 mg Intravenous Q1MIN PRN Samuel Michelle PA-C     • niacin  500 mg Oral Daily With Porfirio Kumar PA-C     • nicotine  1 patch Transdermal Daily Samuel Michelle PA-C     • oxyCODONE  5 mg Oral Q6H PRN Kush Ponce MD     • polyethylene glycol  17 g Oral Daily PRN Samuel Michelle PA-C     • polyethylene glycol  17 g Oral Daily Pepper Tesfaye MD     • senna  1 tablet Oral HS Pepper Tesfaye MD         Risks / Benefits of Treatment:    Risks, benefits, and possible side effects of medications explained to patient and patient verbalizes understanding  Other treatment modalities recommended as indicated:    · Outpatient psychiatric follow-up      Inpatient consult to Psychiatry  Consult performed by: Tiffanie Martínez MD  Consult ordered by: Kika Avina DO        Physician Requesting Consult: Denise Betts MD  Principal Problem:Closed displaced fracture of left acetabulum [facility])    Reason for Consult: Psychiatric clearance for rehab placement needed      History of Present Illness      This is a follow-up psychiatry consult to that provider on May 2023  Please see that consult for details  Inserted from that consult: This is a comprehensive psychiatric evaluation for the patient Josefina Roberts is an 80year old male, , living with his son in [address] [place] [address]  Ellen Roberts has a reported past medical history that includes coronary artery disease, bladder cancer, and dementia  Ellen Roberts presented to the [facility] ED on 5/13/23 brought in by EMS with left hip pain following a fall that occurred 2 weeks prior to hospital presentation  He was assessed in the emergency department, found to have a closed displaced fracture of the left acetabulum and was subsequently admitted to the hospital for medical stabilization  During his hospital stay at [facility] he was seen and assessed by physical therapy as well as occupational therapy  Short-term rehab was recommended  "As the patient's hospital stay has progressed, the patient has reported to staff suicidal ideation  Through reports and through discussion with nursing, it appears patient has made these statements out of frustration and in the setting of pain  Psych was consulted as a result of the suicidal statements      At the time of interview, Michelle Holley is a poor historian  He is oriented to self  He is not oriented to situation, stating he is at home with his son  He is not oriented to time  He is not oriented to recent events and does not understand the events that brought him to the hospital      Today, at the time of interview, Michelle Holley states that he feels \"fine\", but then adds that he has been feeling \"beat up\"  He reports that he is in pain and has been struggling emotionally since the passing of his wife  At the time of interview, he denied suicidal ideation, asking Caren Mistry would I think about that\"?     At the time of the interview today, Michelle Holley was noted to be guarded, suspicious, and irritable  He stated \"I do not know who you are\"  He was able to be redirected for a brief period of time, but quickly became agitated with the interview  Michelle Holley is only willing to converse for a few minutes and then refused to speak further  He stated \"I do not have to say nothing to nobody\"  He disconnected the call  Nursing attempted to encourage the patient, however Michelle Holley continued to refuse to speak with psychiatry      At this time, Michelle Holley demonstrates impaired cognition  The patient demonstrates irritability and agitation and it is likely that he is suffering from delirium superimposed upon dementia  At this time, a full safety evaluation could not be conducted as the patient refused to participate in interview  At this time we recommend that the patient's medical status be optimized so as to correct for potential delirium/behaviors    Psychiatry will attempt to follow up after the patient's medical condition stabilizes to " determine if he requires inpatient psych at a later time      Stressors:  • Recent fall and recent hip fracture  • Underlying dementia  • Unfamiliar hospital environment     Psychiatric Review Of Systems:     Unable to obtain psychiatric review of systems as the patient became agitated and uncooperative            Historical Information []Expand by Default        Past Psychiatric History:      Past psychiatric history is unknown and could not be obtained as the patient became agitated and uncooperative       Substance Abuse History:      Social History      Tobacco History      Smoking Status  Every Day Smoking Frequency  0 50 packs/day Smoking Tobacco Type  Cigarettes     Smokeless Tobacco Use  Never                Alcohol History      Alcohol Use Status  Yes                Drug Use      Drug Use Status  Never                Sexual Activity      Sexually Active  Not Currently            Activities of Daily Living    Not Asked                      I am unable to assess the patient for substance use within the past 12 months as they are unable or unwilling to answer     Family Psychiatric History:   Psychiatric Illness:      unknown, unable to obtain  Substance Abuse:       unknown, unable to obtain  Suicide Attempts:        unknown, unable to obtain     Social History:  Education: unable to obtain  Learning Disabilities: unable to obtain  Marital History:   Children: the patient has a son whom he lives with, further information could not be obtained  Living Arrangement: Lives with his son in [address] [place] [address]  Occupational History:   Functioning Relationships: good support system  Legal History: unknown, unable to obtain   History: unable to obtain     Traumatic History:   Abuse: Unable to obtain  Other Traumatic Events: Unable to obtain      Past Medical History:  History of Seizures: Unable to obtain  History of Head injury with loss of consciousness: Unable to obtain  [address] [place] [address] "by Default        Past Medical History:   Diagnosis Date   • AMS (altered mental status) 4/20/2022   • Bladder cancer ([facility] [address] )     • CAD (coronary artery disease)     • Dyslipidemia     • History of heart artery stent     • Umbilical hernia              Surgical History[]Expand by Default         Past Surgical History:   Procedure Laterality Date   • BLADDER SURGERY       • CORONARY ANGIOPLASTY WITH STENT PLACEMENT       • ORIF PELVIS Left 5/15/2023     Procedure: Percutaneous fixation L acetabulum fracture;  Surgeon: Charlie Foley MD;  Location: [facility];  Service: Orthopedics   • TRANSODONTOID FUSION N/A 5/4/2020     Procedure: Transodontoid Fusion;  Surgeon: Ollen Bloch, MD;  Location:  MAIN OR;  Service: Neurosurgery            Medical Review Of Systems:  Review of systems not obtained due to patient factors            Meds/Allergies      all current active meds have been reviewed        Allergies   Allergen Reactions   • Iodine - Food Allergy         Other reaction(s): Unknown Reaction       Interval Hospital course: Nursing explains that the patient made suicidal comments about a week or so ago when the patient next-door and become very agitated  He is agitated patient and out of frustration he said he made a comment that he might as well jump out of the window  He later explained that he was not having any actual suicidal ideations intent plan or intent when he was speaking out of his frustration  Nursing reports the patient has had no further suicidal comments or ideation and has been pleasant and cooperative without behavioral disturbance  Mental status examination: The patient is alert and oriented to person, birthdate knows that he is in the hospital   He does not know any aspect of the date  When asked why he was here he indicated that he was here \"for very mood in my side\"  He reports good mood happy and content without concern or complaint  He was smiling through the assessment    " "Speech is unremarkable  Thought process was logical and linear  Thought content was reality based  Associations were tight  Memory is impaired in all spheres  He did not appear to recall that he made any suicidal comments and when asked if he was having suicidal ideation he responded though then asked Ambrosio Delgadillorel would I think that? \"  He denied homicidal ideation  He denies hallucinations other psychotic features  Cognition, insight and judgment are impaired due to his cognitive limitations  He appears to be at his baseline at this time  Past Medical History:   Diagnosis Date   • AMS (altered mental status) 4/20/2022   • Bladder cancer ([facility] [address] )    • CAD (coronary artery disease)    • Dyslipidemia    • History of heart artery stent    • Umbilical hernia        Medical Review Of Systems:    Review of Systems    Meds/Allergies     all current active meds have been reviewed  Allergies   Allergen Reactions   • Iodine - Food Allergy      Other reaction(s): Unknown Reaction       Objective     Vital signs in last 24 hours:  Temp:  [97 7 °F (36 5 °C)-98 7 °F (37 1 °C)] 98 7 °F (37 1 °C)  Resp:  [16-18] 16  BP: (135-138)/(61-62) 135/62      Intake/Output Summary (Last 24 hours) at 5/23/2023 1143  Last data filed at 5/23/2023 0540  Gross per 24 hour   Intake 480 ml   Output 450 ml   Net 30 ml       Lab Results: I have personally reviewed all pertinent laboratory/tests results  Imaging Studies: XR chest portable    Result Date: 5/15/2023  Narrative: CHEST INDICATION:   pre op clearance  COMPARISON: April 20, 2022 EXAM PERFORMED/VIEWS:  XR CHEST PORTABLE FINDINGS: Cardiomediastinal silhouette appears unremarkable  Bibasilar densities are seen Crowding of the lung markings  Osseous structures appear within normal limits for patient age       Impression: Bibasilar density seen which may be due to atelectasis or infiltrate Increased lung markings may be due to mild congestion or hypoinflation Workstation performed: " [id number]     XR pelvis complete 3+ vw    Result Date: 5/15/2023  Narrative: C-ARM -pelvis INDICATION: Left acetabular fracture  Procedure guidance  COMPARISON: Left hip and pelvis radiographs 5/13/2023 TECHNIQUE: FLUOROSCOPY TIME:   158 seconds 11 FLUOROSCOPIC IMAGES FINDINGS: Fluoroscopic guidance provided for procedure guidance  Osseous and soft tissue detail limited by technique  Impression: Fluoroscopic guidance provided for procedure guidance  Please refer to the separate procedure notes for additional details  Workstation performed: [id number]     XR hip/pelv 2-3 vws left    Result Date: 5/14/2023  Narrative: LEFT HIP INDICATION:   hip pain  COMPARISON: 4/28/2020 VIEWS:  XR HIP/PELV 2-3 VWS LEFT  W PELVIS IF PERFORMED FINDINGS: Posterior left acetabular nondisplaced fracture; known Right inferior pubic ramus old trauma  Bilateral degenerative hip joints No lytic or blastic osseous lesion  Aortobiiliac vascular stent  Degenerative lower lumbar spine     Impression: Posterior acetabular fracture Workstation performed: [id number]     VAS lower limb venous duplex study, unilateral/limited    Result Date: 5/16/2023  Narrative:  [facility] REPORT CLINICAL: Indications: Patient presents with left lower extremity pain s/p ORIF  Operative History: Coronary angioplasty with stent EVAR Risk Factors The patient has history of CAD, AAA, bladder cancer and smoking (current) 0 5 ppd  He is currently receiving Lovenox  CONCLUSION:  Impression:  RIGHT LOWER LIMB LIMITED: Evaluation shows no evidence of thrombus in the common femoral vein  Doppler evaluation shows a normal response to augmentation maneuvers  LEFT LOWER LIMB: No evidence of acute or chronic deep vein thrombosis  No evidence of superficial thrombophlebitis noted  Doppler evaluation shows a normal response to augmentation maneuvers  Popliteal, posterior tibial and anterior tibial arterial Doppler waveform's are triphasic   Note: There is a "well defined hypoechoic non-vascularized cystic-type structure noted in the popliteal fossa  Technically difficult/limited study  Some segments may be poorly visualized on today's exam  Technical findings were given to Isabella Thornton PA-C via [employer] and posted to chart  SIGNATURE: Electronically Signed by: Angelica Blackwood MD, [address] on 2023-05-16 03:00:55 PM    CT abdomen pelvis with contrast    Addendum Date: 5/13/2023 Addendum:   ADDENDUM: There is a typographical error in the body and impression of the report  It should state there is a \"Acute , mildly displaced, comminuted fracture of the LEFT acetabulum involving the anterior and posterior columns  \"     Result Date: 5/13/2023  Narrative: CT ABDOMEN AND PELVIS WITH IV CONTRAST INDICATION:   Abdominal pain, acute, nonlocalized  abd/back/left hip pain  COMPARISON: CT of the chest, abdomen and pelvis from 4/28/2020  TECHNIQUE:  CT examination of the abdomen and pelvis was performed  Multiplanar 2D reformatted images were created from the source data  This examination, like all CT scans performed in the [facility], was performed utilizing techniques to minimize radiation dose exposure, including the use of iterative reconstruction and automated exposure control  Radiation dose length product (DLP) for this visit:  619 mGy-cm IV Contrast:  100 mL of iohexol (OMNIPAQUE) Enteric Contrast:  Enteric contrast was not administered  FINDINGS: Evaluation is limited by motion artifact  ABDOMEN LOWER CHEST: Patchy opacities at the lung bases, which may represent atelectasis versus pneumonia  No pleural effusions  LIVER/BILIARY TREE:  Normal liver morphology  There are multiple simple cysts in the liver, the largest measuring 8 cm in the right hepatic lobe  There are also subcentimeter hypodense lesions too small to accurately characterize with CT technique, however statistically likely additional cysts  No biliary ductal dilation   GALLBLADDER:  No " calcified gallstones  No pericholecystic inflammatory change  SPLEEN: Unremarkable  PANCREAS:  Unremarkable  Normal caliber main pancreatic duct  ADRENAL GLANDS:  Unremarkable  KIDNEYS/URETERS:  Symmetric renal enhancement  2 mm calculus in the right kidney  No ureteral calculi  No hydronephrosis  Several simple cysts in the kidneys  There are also several subcentimeter hypodense lesions in both kidneys, too small to accurately  characterize with CT technique, however statistically likely additional cysts  STOMACH AND BOWEL:  Evaluation of the gastrointestinal tract is somewhat limited by underdistention and lack of oral contrast  No bowel obstruction or convincing inflammation  APPENDIX: The appendix is not visualized, however there are no secondary findings of appendicitis  ABDOMINOPELVIC CAVITY:  No ascites or pneumoperitoneum  LYMPH NODES: No abdominal or pelvic lymphadenopathy  VESSELS: The patient is status post endovascular graft repair of an abdominal aortic aneurysm  The graft extends from the suprarenal abdominal aorta into the bilateral common iliac arteries  The graft is patent  The maximum sac diameter measures 4 9 cm in diameter, increased from 4 6 cm when remeasured in a similar manner on the April 2020 CT  Although evaluation for endoleak is limited without precontrast images, there are foci of increased density similar to the blood pool outside of the graft within  the aneurysm sac (for instance series 2 image 87 90), which suggest the presence of an endoleak  There is also a 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, increased from 2 0 cm in April 2020 and with partial thrombosis  The left common iliac artery is aneurysmal measuring 1 7 cm in diameter, overall similar to April 2020  Patent major branch vessels  Scattered atherosclerotic calcifications in the abdominal aorta and its major branches  PELVIS REPRODUCTIVE ORGANS: The prostate gland is not enlarged   URINARY BLADDER: There is perivesical fat stranding  ABDOMINAL WALL/INGUINAL REGIONS: There is a small right inguinal hernia containing several nonobstructed loops of small bowel  There is also a small fat-containing supraumbilical ventral abdominal wall hernia  OSSEOUS STRUCTURES: There is an acute, mildly displaced, comminuted fracture of the right acetabulum  The fracture line involves both the anterior and posterior columns  There are no other acute fractures  No hip dislocation  There is a chronic appearing moderate fracture deformity of the T12 vertebral body  Although the images are not available for direct comparison, a report for a CT of the chest, abdomen and pelvis performed at [address] [place] [address] on 9/19/2021 mentions the presence of a T12 compression fracture  There is no retropulsion into the spinal canal  There is a chronic, healed fracture of the right inferior pubic ramus  No focal aggressive osseous lesions  Degenerative changes of the spine  Generalized osteopenia  Impression: Acute, mildly displaced, comminuted fracture of the right acetabulum involving the anterior and posterior columns  No other acute fractures  No hip dislocation  Perivesical fat stranding, suggestive of cystitis  Recommend correlation with urinalysis  Infrarenal abdominal aortic aneurysm status post EVAR with patent graft, however increased size of maximum aneurysm sac diameter now measuring 4 9 compared to 4 6 cm in April 2020 with findings suggestive of an endoleak  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 also mildly enlarged from 2 0 cm in April 2020  Patchy opacities at the lung bases, which may represent atelectasis versus pneumonia  Additional findings as above  The study was marked in [facility] for immediate notification   Workstation performed: [id number]     Echo complete w/ contrast if indicated    Result Date: 5/14/2023  Narrative: •  Left Ventricle: Left ventricular cavity size is normal  Wall thickness is normal  The left ventricular ejection fraction is 55%  Systolic function is normal  Wall motion is normal  Diastolic function is normal  •  Aortic Valve: There is mild to moderate regurgitation  •  Mitral Valve: There is mild annular calcification  •  Tricuspid Valve: Estimated RVSP 35 mm Hg  EKG/Pathology/Other Studies:   Lab Results   Component Value Date    VENTRATE 73 05/13/2023    ATRIALRATE 73 05/13/2023    PRINT 140 05/13/2023    QRSDINT 104 05/13/2023    QTINT 406 05/13/2023    QTCINT 447 05/13/2023    PAXIS 45 05/13/2023    QRSAXIS 57 05/13/2023    TWAVEAXIS -40 05/13/2023        Code Status: Level 1 - Full Code  Advance Directive and Living Will:      Power of :    POLST:      Screenings:    1  Nutrition Screening  · Not available on chart    2  Pain Screening  Not available on chart    3  Suicide Screening  Not available on chart    Counseling / Coordination of Care: Total floor / unit time spent today 30 minutes  Greater than 50% of total time was spent with the patient and / or family counseling and / or coordination of care  A description of the counseling / coordination of care: Chart review, patient evaluation, coordination communication with staff, nursing and provider

## 2023-05-23 NOTE — OCCUPATIONAL THERAPY NOTE
Occupational Therapy Treatment Note         Patient Name: Brice Aden  XSHIM'V Date: 5/23/2023 05/23/23 0847   OT Last Visit   OT Visit Date 05/23/23   Note Type   Note Type Treatment   Pain Assessment   Pain Assessment Tool 0-10   Pain Score 9  (none at rest, 9/10 reported post mobility)   Pain Location/Orientation Orientation: Left; Location: Hip   Hospital Pain Intervention(s) Repositioned; Ambulation/increased activity; Emotional support   Pain Rating: FLACC (Rest) - Face 0   Pain Rating: FLACC (Rest) - Legs 0   Pain Rating: FLACC (Rest) - Activity 0   Pain Rating: FLACC (Rest) - Cry 0   Pain Rating: FLACC (Rest) - Consolability 0   Score: FLACC (Rest) 0   Pain Rating: FLACC (Activity) - Face 1   Pain Rating: FLACC (Activity) - Legs 1   Pain Rating: FLACC (Activity) - Activity 1   Pain Rating: FLACC (Activity) - Cry 1   Pain Rating: FLACC (Activity) - Consolability 1   Score: FLACC (Activity) 5   Restrictions/Precautions   Weight Bearing Precautions Per Order Yes   LLE Weight Bearing Per Order PWB  (Toe touch weightbearing/50% flat foot weightbearing Left lower extremity with use of walker for safe ambulation)   Other Precautions Cognitive; Chair Alarm; Bed Alarm;WBS;Fall Risk;Pain   ADL   Where Assessed Chair   Eating Assistance 5  Supervision/Setup   Eating Deficit Setup; Increased time to complete;Verbal cueing   Grooming Assistance 5  Supervision/Setup   Grooming Deficit Setup; Increased time to complete;Supervision/safety   UB Bathing Assistance 4  Minimal Assistance   UB Bathing Deficit Setup;Verbal cueing; Increased time to complete   LB Bathing Assistance 2  Maximal Assistance   LB Bathing Deficit Setup;Verbal cueing;Supervision/safety   UB Dressing Assistance 4  Minimal Assistance   UB Dressing Deficit Setup; Increased time to complete   LB Dressing Assistance 2  Maximal Assistance   LB Dressing Deficit Setup;Verbal cueing;Supervision/safety   Toileting Assistance  2  Maximal Assistance Toileting Deficit Setup; Increased time to complete; Bedside commode   Bed Mobility   Supine to Sit 4  Minimal assistance   Additional items Assist x 2;HOB elevated; Bedrails; Increased time required;Verbal cues;LE management   Transfers   Sit to Stand 4  Minimal assistance   Additional items Assist x 2;Armrests; Increased time required;Verbal cues   Stand to Sit 4  Minimal assistance   Additional items Assist x 2;Armrests; Increased time required;Verbal cues   Additional Comments vc for appropriate hand/foot placement, proper body mechanics, and maintaining WBS t/o all transitional movements   Functional Mobility   Functional Mobility 4  Minimal assistance   Additional Comments assist of 2/ short distance in room- bed to commode about 10 feet away, and back to the [facility] 22 chair   Toilet Transfers   Toilet Transfer From Bed   Toilet Transfer Type To and from   Toilet Transfer to Standard bedside commode   Toilet Transfer Technique Ambulating   Toilet Transfers Minimal assistance  (assist of 2)   Therapeutic Exercise - ROM   UE-ROM Yes   ROM- Right Upper Extremities   R Shoulder AROM; Flexion;ABduction; Extension;Horizontal ABduction   R Weight/Reps/Sets 2 sets of 10 repetitions   ROM - Left Upper Extremities    L Shoulder AROM; Flexion; Extension   L Elbow AROM;Elbow extension;Elbow flexion   L Weight/Reps/Sets 2 sets of 10 repetitions   Cognition   Overall Cognitive Status Impaired   Arousal/Participation Alert; Responsive   Attention Attends with cues to redirect   Orientation Level Oriented to person;Oriented to place; Disoriented to time;Disoriented to situation   Memory Decreased recall of recent events;Decreased recall of precautions   Following Commands Follows one step commands inconsistently   Activity Tolerance   Activity Tolerance Patient limited by pain; Patient limited by fatigue   Medical Staff Made Aware Patient required assist of 2 skilled therapists to facilitate neuromuscular components of movement, provide weight bearing assistance to increase self performance in transitional movements and achieve functional sitting/ standing activities  Assessment   Assessment Patient participated in Skilled OT session this date with interventions consisting of ADL re training with the use of correct body mechnaics, Energy Conservation techniques, Work simplification skills , safety awareness and fall prevention techniques, maintaining weight bearing restrictions, therapeutic exercise to: increase functional use of BUEs, increase BUE muscle strength ,  therapeutic activities to: increase activity tolerance, increase standing tolerance time with unilateral UE support to complete sink level ADLs, increase cardiovascular endurance , elicit righting and equilibrium reactions for improved postural control and alignment during transitional movements, increase dynamic sit/ stand balance during functional activity , increase postural control and increase OOB/ sitting tolerance   Patient agreeable to OT treatment session, upon arrival patient was found supine in bed, alert, responsive  and in no apparent distress  In comparison to previous session, patient with improvements in overall endurance and activity engagement  Patient requires min assist for UB ADLs, max assist for LB ADLs  Patient requiring verbal cues for correct technique, verbal cues for pacing thru activity steps, cognitive assistance to anticipate next step and frequent rest periods  Patient continues to be functioning below baseline level, occupational performance remains limited secondary to factors listed above and increased risk for falls and injury  From OT standpoint, recommendation at time of d/c would be Short Term Rehab  Patient to benefit from continued Occupational Therapy treatment while in the hospital to address deficits as defined above and maximize level of functional independence with ADLs and functional mobility     Plan   Treatment Interventions ADL retraining;Functional transfer training;UE strengthening/ROM; Endurance training;Cognitive reorientation;Patient/family training;Equipment evaluation/education; Compensatory technique education;Continued evaluation; Energy conservation; Activityengagement   Goal Expiration Date 05/30/23   OT Frequency 3-5x/wk   Recommendation   OT Discharge Recommendation Post acute rehabilitation services   AM-PAC Daily Activity Inpatient   Lower Body Dressing 1   Bathing 2   Toileting 2   Upper Body Dressing 2   Grooming 3   Eating 3   Daily Activity Raw Score 13   Daily Activity Standardized Score (Calc for Raw Score >=11) 32 03   AM-PAC Applied Cognition Inpatient   Following a Speech/Presentation 3   Understanding Ordinary Conversation 3   Taking Medications 1   Remembering Where Things Are Placed or Put Away 1   Remembering List of 4-5 Errands 1   Taking Care of Complicated Tasks 1   Applied Cognition Raw Score 10   Applied Cognition Standardized Score 24 98

## 2023-05-23 NOTE — PLAN OF CARE
Problem: OCCUPATIONAL THERAPY ADULT  Goal: Performs self-care activities at highest level of function for planned discharge setting  See evaluation for individualized goals  Description: Treatment Interventions: ADL retraining, Functional transfer training, UE strengthening/ROM, Endurance training, Cognitive reorientation, Patient/family training, Equipment evaluation/education, Compensatory technique education, Continued evaluation, Energy conservation, Activityengagement          See flowsheet documentation for full assessment, interventions and recommendations  Note: Limitation: Decreased ADL status, Decreased UE strength, Decreased Safe judgement during ADL, Decreased cognition, Decreased endurance, Decreased self-care trans, Decreased high-level ADLs  Prognosis: Good  Assessment: Patient participated in Skilled OT session this date with interventions consisting of ADL re training with the use of correct body mechnaics, Energy Conservation techniques, Work simplification skills , safety awareness and fall prevention techniques, maintaining weight bearing restrictions, therapeutic exercise to: increase functional use of BUEs, increase BUE muscle strength ,  therapeutic activities to: increase activity tolerance, increase standing tolerance time with unilateral UE support to complete sink level ADLs, increase cardiovascular endurance , elicit righting and equilibrium reactions for improved postural control and alignment during transitional movements, increase dynamic sit/ stand balance during functional activity , increase postural control and increase OOB/ sitting tolerance   Patient agreeable to OT treatment session, upon arrival patient was found supine in bed, alert, responsive  and in no apparent distress  In comparison to previous session, patient with improvements in overall endurance and activity engagement  Patient requires min assist for UB ADLs, max assist for LB ADLs   Patient requiring verbal cues for correct technique, verbal cues for pacing thru activity steps, cognitive assistance to anticipate next step and frequent rest periods  Patient continues to be functioning below baseline level, occupational performance remains limited secondary to factors listed above and increased risk for falls and injury  From OT standpoint, recommendation at time of d/c would be Short Term Rehab  Patient to benefit from continued Occupational Therapy treatment while in the hospital to address deficits as defined above and maximize level of functional independence with ADLs and functional mobility       OT Discharge Recommendation: Post acute rehabilitation services

## 2023-05-23 NOTE — CASE MANAGEMENT
Case Management Discharge Planning Note    Patient name Jose Juan Cooper  Location [address] 205/-45 MRN [id number]  : 1935 Date 2023       Current Admission Date: 2023  Current Admission Diagnosis:Closed displaced fracture of left acetabulum [facility])   Patient Active Problem List    Diagnosis Date Noted   • MCI (mild cognitive impairment) with memory loss 2023   • Pre-operative clearance 2023   • Closed displaced fracture of left acetabulum ([facility] ) 2023   • Aortic aneurysm ([facility] ) 2023   • Abnormal CT of the abdomen 2023   • Positive blood culture 2022   • Delirium 2020   • Fall 2020   • Bladder cancer ([facility] ) 2020   • CAD (coronary artery disease) 2020   • H/O heart artery stent    • Umbilical hernia    • Odontoid fracture with type II morphology ([facility] ) 2020   • Multiple abrasions 2020   • Nasal bones, closed fracture 2020   • Atelectasis 2020   • Pulmonary nodule 2020   • Renal cyst 2020   • Liver cyst 2020      LOS (days): 10  Geometric Mean LOS (GMLOS) (days): 3 80  Days to GMLOS:-5 9     OBJECTIVE:  Risk of Unplanned Readmission Score: 15 29      Current admission status: Inpatient   Preferred Pharmacy:   [address] [place] [address] #[id number] Chacho Scherer [address] [facility] [address] [facility] [address]  Phone: [phone] Fax: [phone]    Primary Care Provider: Diomedes Colón PA-C    Primary Insurance: [facility]  Secondary Insurance:     DISCHARGE DETAILS:  CM contacted Kaia leo at home  No answer, voice message left requesting return call  CM also provided [facility]'s contact information  Patient will also need insurance authorization prior to d/c

## 2023-05-23 NOTE — ARC ADMISSION
Referral was received for consideration of patient for inpatient acute rehab at [facility]  After review with Vasyl Sellers physician patient is accepted for [facility]  Auth can be submitted  Our NPI is  Dr Michelle Kaye [id number]  Currently [facility] has no beds til end of week and [facility] has limited beds  CM made aware    Benefits checked: Copay $360 a day for 5 days, 0% co-insurance, OOP Max $7550 of which $0 is met none

## 2023-05-23 NOTE — PROGRESS NOTES
-- Patient:  -- MRN: [id number]  -- Aidin Request ID: [id number]  -- Level of care reserved: Inpatient [facility]  -- Partner Reserved: [facility] Acute Rehab - (Senath/[place]ent[place]/Irvona), Bharath, Santi [facility] [phone]  -- Clinical needs requested:  -- Geography searched: 50 miles around [address]  -- Start of Service:  -- Request sent: 12:38pm EDT on 5/17/2023 by Laverne Littlejohn  -- Partner reserved: 1:02pm EDT on 5/23/2023 by Laverne Littlejohn  -- Choice list shared: 1:00pm EDT on 5/23/2023 by Laverne Littlejohn

## 2023-05-23 NOTE — CASE MANAGEMENT
M[address] Du[facility] [address] received request for authorization from Care Manager    Authorization request for: Acute Rehab  Facility Name: [facility] NPI: [id number]   Facility MD: Rommel Broderick NPI: [id number]   Authorization initiated by contacting insurance: [employer]renice [employer] Via: [employer]    Pending Reference #: [id number]   Clinicals submitted via: [employer]

## 2023-05-23 NOTE — CASE MANAGEMENT
Case Management Discharge Planning Note    Patient name Jose Juan Cooper  Location [address] 205/-03 MRN [id number]  : 1935 Date 2023       Current Admission Date: 2023  Current Admission Diagnosis:Closed displaced fracture of left acetabulum [facility])   Patient Active Problem List    Diagnosis Date Noted   • MCI (mild cognitive impairment) with memory loss 2023   • Pre-operative clearance 2023   • Closed displaced fracture of left acetabulum ([facility] ) 2023   • Aortic aneurysm ([facility] ) 2023   • Abnormal CT of the abdomen 2023   • Positive blood culture 2022   • Delirium 2020   • Fall 2020   • Bladder cancer ([facility] ) 2020   • CAD (coronary artery disease) 2020   • H/O heart artery stent    • Umbilical hernia    • Odontoid fracture with type II morphology ([facility] ) 2020   • Multiple abrasions 2020   • Nasal bones, closed fracture 2020   • Atelectasis 2020   • Pulmonary nodule 2020   • Renal cyst 2020   • Liver cyst 2020      LOS (days): 10  Geometric Mean LOS (GMLOS) (days): 3 80  Days to GMLOS:-5 9     OBJECTIVE:  Risk of Unplanned Readmission Score: 15 33      Current admission status: Inpatient   Preferred Pharmacy:   [address] [place] [address]  Phone: [phone] Fax: [phone]    Primary Care Provider: Diomedes Colón PA-C    Primary Insurance: [facility] C[facility]  Secondary Insurance:     DISCHARGE DETAILS:    Discharge planning discussed with[de-identified] Son - Kaia Katz via phone  Freedom of Choice: Yes  Comments - Freedom of Choice: CM contacted Kaia leo to review available ARC placements  [facility], [facility] & [facility] have accepted for placement  Familiy preference is [facility] however, there are no beds available  Family would like patient to go to [facility] reserved in [place]  Patient will need insurance auth    CM contacted family/caregiver?: Yes  Were Treatment Team discharge recommendations reviewed with patient/caregiver?: Yes (As it pertains to d/c planning and CM role )  Did patient/caregiver verbalize understanding of patient care needs?: N/A- going to facility  Were patient/caregiver advised of the risks associated with not following Treatment Team discharge recommendations?: Yes (As it pertains to d/c planning and CM role )    Contacts  Patient Contacts: Brigido Mcgrath (son)  Relationship to Patient[de-identified] Family  Contact Method: Phone  Phone Number: [phone]  Reason/Outcome: Continuity of Care, Discharge [address]         [facility] No    DME Referral Provided  Referral made for DME?: No    Other Referral/Resources/Interventions Provided:  Interventions: Acute Rehab  Referral Comments: [facility] reserved in [address]  Patient will need auth  CM to task to D/C Support Team   Patient will need transport at time of d/c  Treatment Team Recommendation: Acute Rehab  Discharge Destination Plan[de-identified] Acute Rehab  Transport at Discharge : [facility]  Dispatcher Contacted: No     IMM Given (Date):: 05/23/23  IMM Given to[de-identified] Family  Family notified[de-identified] Reviewed w/ sonBrigido via phone  Additional Comments: IMM and Medicare rights reviewed as they pertain to d/c planning - via phone w/ sonMarcosramonita Alcides  No current questions or concerns  Original to medical records  Patient copy left bedside for patient/family  1:07p - CM received message from Nelia Larson at [facility] stating that bed availability is limited and they cannot guarantee a bed at this location once auth is received  [facility] as back-up if Ramonita Vega is denied

## 2023-05-24 RX ADMIN — POLYETHYLENE GLYCOL 3350 17 G: 17 POWDER, FOR SOLUTION ORAL at 09:59

## 2023-05-24 RX ADMIN — DOCUSATE SODIUM 100 MG: 100 CAPSULE, LIQUID FILLED ORAL at 09:59

## 2023-05-24 RX ADMIN — ACETAMINOPHEN 975 MG: 325 TABLET, FILM COATED ORAL at 06:08

## 2023-05-24 RX ADMIN — NICOTINE 1 PATCH: 14 PATCH, EXTENDED RELEASE TRANSDERMAL at 09:59

## 2023-05-24 RX ADMIN — Medication 500 MG: at 17:44

## 2023-05-24 RX ADMIN — ACETAMINOPHEN 975 MG: 325 TABLET, FILM COATED ORAL at 21:33

## 2023-05-24 RX ADMIN — ASPIRIN 81 MG: 81 TABLET, CHEWABLE ORAL at 09:59

## 2023-05-24 RX ADMIN — SENNOSIDES 8.6 MG: 8.6 TABLET, FILM COATED ORAL at 21:33

## 2023-05-24 RX ADMIN — ACETAMINOPHEN 975 MG: 325 TABLET, FILM COATED ORAL at 14:23

## 2023-05-24 RX ADMIN — DOCUSATE SODIUM 100 MG: 100 CAPSULE, LIQUID FILLED ORAL at 17:44

## 2023-05-24 RX ADMIN — ENOXAPARIN SODIUM 40 MG: 40 INJECTION SUBCUTANEOUS at 06:08

## 2023-05-24 NOTE — PLAN OF CARE
Problem: Potential for Falls  Goal: Patient will remain free of falls  Description: INTERVENTIONS:  - Educate patient/family on patient safety including physical limitations  - Instruct patient to call for assistance with activity   - Consult OT/PT to assist with strengthening/mobility   - Keep Call bell within reach  - Keep bed low and locked with side rails adjusted as appropriate  - Keep care items and personal belongings within reach  - Initiate and maintain comfort rounds  - Make Fall Risk Sign visible to staff  - Offer Toileting every 2 Hours, in advance of need  - Initiate/Maintain alarm  - Obtain necessary fall risk management equipment:   - Apply yellow socks and bracelet for high fall risk patients  - Consider moving patient to room near nurses station  Outcome: Progressing     Problem: MOBILITY - ADULT  Goal: Maintain or return to baseline ADL function  Description: INTERVENTIONS:  -  Assess patient's ability to carry out ADLs; assess patient's baseline for ADL function and identify physical deficits which impact ability to perform ADLs (bathing, care of mouth/teeth, toileting, grooming, dressing, etc )  - Assess/evaluate cause of self-care deficits   - Assess range of motion  - Assess patient's mobility; develop plan if impaired  - Assess patient's need for assistive devices and provide as appropriate  - Encourage maximum independence but intervene and supervise when necessary  - Involve family in performance of ADLs  - Assess for home care needs following discharge   - Consider OT consult to assist with ADL evaluation and planning for discharge  - Provide patient education as appropriate  Outcome: Progressing  Goal: Maintains/Returns to pre admission functional level  Description: INTERVENTIONS:  - Perform BMAT or MOVE assessment daily    - Set and communicate daily mobility goal to care team and patient/family/caregiver     - Collaborate with rehabilitation services on mobility goals if consulted  - Perform Range of Motion 2 times a day  - Reposition patient every 2 hours  - Dangle patient 2 times a day  - Stand patient 2 times a day  - Ambulate patient 2 times a day  - Out of bed to chair 2 times a day   - Out of bed for meals 2 times a day  - Out of bed for toileting  - Record patient progress and toleration of activity level   Outcome: Progressing     Problem: Prexisting or High Potential for Compromised Skin Integrity  Goal: Skin integrity is maintained or improved  Description: INTERVENTIONS:  - Identify patients at risk for skin breakdown  - Assess and monitor skin integrity  - Assess and monitor nutrition and hydration status  - Monitor labs   - Assess for incontinence   - Turn and reposition patient  - Assist with mobility/ambulation  - Relieve pressure over bony prominences  - Avoid friction and shearing  - Provide appropriate hygiene as needed including keeping skin clean and dry  - Evaluate need for skin moisturizer/barrier cream  - Collaborate with interdisciplinary team   - Patient/family teaching  - Consider wound care consult   Outcome: Progressing     Problem: Nutrition/Hydration-ADULT  Goal: Nutrient/Hydration intake appropriate for improving, restoring or maintaining nutritional needs  Description: Monitor and assess patient's nutrition/hydration status for malnutrition  Collaborate with interdisciplinary team and initiate plan and interventions as ordered  Monitor patient's weight and dietary intake as ordered or per policy  Utilize nutrition screening tool and intervene as necessary  Determine patient's food preferences and provide high-protein, high-caloric foods as appropriate       INTERVENTIONS:  - Monitor oral intake, urinary output, labs, and treatment plans  - Assess nutrition and hydration status and recommend course of action  - Evaluate amount of meals eaten  - Assist patient with eating if necessary   - Allow adequate time for meals  - Recommend/ encourage appropriate diets, oral nutritional supplements, and vitamin/mineral supplements  - Order, calculate, and assess calorie counts as needed  - Recommend, monitor, and adjust tube feedings and TPN/PPN based on assessed needs  - Assess need for intravenous fluids  - Provide specific nutrition/hydration education as appropriate  - Include patient/family/caregiver in decisions related to nutrition  Outcome: Progressing

## 2023-05-24 NOTE — CASE MANAGEMENT
[address]arcel Du[address] received request for authorization from Care Manager    Authorization request for: SNF  Facility Name:  [facility] MD: Dr Radha Noel: [id number]   Authorization initiated by contacting insurance: Costa yepez Via: [employer]ail[employer]  Pending Reference #: [id number]   Clinicals submitted via: Chasity Dominguez

## 2023-05-24 NOTE — PROGRESS NOTES
[address] [facility]  Progress Note  Name: Alan Lucero  MRN: [id number]  Unit/Bed#: -01 I Date of Admission: 5/13/2023   Date of Service: 5/24/2023 I Hospital Day: 6    Assessment/Plan   MCI (mild cognitive impairment) with memory loss  Assessment & Plan  · Cognitive decline lives with son  · Had reported some thoughts of ending life however he did not mean this  · Was seen by psychiatry and now stable off one-to-one observation  · Patient reported this was sudden frustration but he did not mean it  · No need for inpatient psychiatry  Awaiting rehab    Abnormal CT of the abdomen  Assessment & Plan  · CT revealed perivesical fat stranding, suggestive of cystitis and patchy opacities at the lung bases, which may represent atelectasis versus pneumonia  · No leukocytosis or fevers   · UA negative   · Monitor off antibiotics    Aortic aneurysm (HCC)  Assessment & Plan  · History of aortic aneurysm status post EVAR with increasing aneurysm sac diameter  · Case was discussed by ED with vascular surgery for type II endoleak  · Follow-up with vascular as an outpatient  CAD (coronary artery disease)  Assessment & Plan  · CAD with possibility of stenting  No chest pain  · Continue aspirin  ·     * Closed displaced fracture of left acetabulum [facility])  Assessment & Plan  Background: History of CAD and aortic aneurysm presents to the hospital found to have left acetabular fracture  · Underwent ORIF and has been doing well  · Currently pain controlled  · Awaiting rehab           VTE Pharmacologic Prophylaxis:   Pharmacologic: Enoxaparin (Lovenox)  Mechanical VTE Prophylaxis in Place: Yes    Patient Centered Rounds: I have performed bedside rounds with nursing staff today  Education and Discussions with Family / Patient: Discussed with the family at the bedside    Time Spent for Care: 20 minutes    More than 50% of total time spent on counseling and coordination of care as described above     Current Length of Stay: 11 day(s)    Current Patient Status: Inpatient       Discharge Plan: Patient is medically stable awaiting insurance authorization    Code Status: Level 1 - Full Code      Subjective:   Patient seen and examined  He has no complaints himself  No acute events reported overnight    Objective:     Vitals:   Temp (24hrs), Av 9 °F (36 6 °C), Min:97 4 °F (36 3 °C), Max:98 4 °F (36 9 °C)    Temp:  [97 4 °F (36 3 °C)-98 4 °F (36 9 °C)] 97 4 °F (36 3 °C)  Resp:  [18] 18  BP: (129-155)/(64-77) 139/72  Body mass index is 19 42 kg/m²  Input and Output Summary (last 24 hours): Intake/Output Summary (Last 24 hours) at 2023 1226  Last data filed at 2023 0930  Gross per 24 hour   Intake 590 ml   Output 300 ml   Net 290 ml       Physical Exam:     Physical Exam  (   General Appearance:    Alert, cooperative, no distress, appears stated age                               Lungs:     Clear to auscultation bilaterally, respirations unlabored       Heart:    Regular rate and rhythm, S1 and S2 normal, no murmur, rub    or gallop   Abdomen:     Soft, non-tender, bowel sounds active all four quadrants,     no masses, no organomegaly           Extremities:   Extremities normal, atraumatic, no cyanosis or edema       Additional Data:     Labs:    Results from last 7 days   Lab Units 23  0454 23  0439   EOS PCT %  --  2   HEMATOCRIT % 32 6* 32 9*   HEMOGLOBIN g/dL 10 5* 10 9*   LYMPHS PCT %  --  21   MONOS PCT %  --  9   NEUTROS PCT %  --  65   PLATELETS Thousands/uL 371 303   WBC Thousand/uL 10 11 7 31     Results from last 7 days   Lab Units 23  0454   ANION GAP mmol/L 5   BUN mg/dL 29*   CALCIUM mg/dL 9 6   CHLORIDE mmol/L 106   CO2 mmol/L 23   CREATININE mg/dL 1 06   GLUCOSE RANDOM mg/dL 99   POTASSIUM mmol/L 4 6   SODIUM mmol/L 134*                           * I Have Reviewed All Lab Data Listed Above  * Additional Pertinent Lab Tests Reviewed:  All Labs For Current Hospital Admission Reviewed        Recent Cultures (last 7 days):           Last 24 Hours Medication List:   Current Facility-Administered Medications   Medication Dose Route Frequency Provider Last Rate   • acetaminophen  975 mg Oral Q8H Cristino Almaraz PA-C     • aspirin  81 mg Oral Daily Brian Buckley PA-C     • bisacodyl  5 mg Oral Daily PRN Andres Moses MD     • docusate sodium  100 mg Oral BID Andres Moses MD     • enoxaparin  40 mg Subcutaneous Q24H [facility] Vern Kumar PA-C     • lidocaine  1 patch Topical Daily PRN Brian Buckley PA-C     • naloxone  0 04 mg Intravenous Q1MIN PRN Brian Buckley PA-C     • niacin  500 mg Oral Daily With Porfirio Kumar PA-C     • nicotine  1 patch Transdermal Daily Brian Buckley PA-C     • oxyCODONE  5 mg Oral Q6H PRN Violeta Keys MD     • polyethylene glycol  17 g Oral Daily PRN Brian Buckley PA-C     • polyethylene glycol  17 g Oral Daily Andres Moses MD     • senna  1 tablet Oral HS Andres Moses MD          Today, Patient Was Seen By: Chilo Kamara MD    ** Please Note: Dictation voice to text software may have been used in the creation of this document   **

## 2023-05-24 NOTE — CASE MANAGEMENT
Case Management Discharge Planning Note    Patient name Rios Ramirez  Location [address] 205/-32 MRN [id number]  : 1935 Date 2023       Current Admission Date: 2023  Current Admission Diagnosis:Closed displaced fracture of left acetabulum [facility])   Patient Active Problem List    Diagnosis Date Noted   • MCI (mild cognitive impairment) with memory loss 2023   • Pre-operative clearance 2023   • Closed displaced fracture of left acetabulum ([facility] [address] ) 2023   • Aortic aneurysm ([facility] [address] ) 2023   • Abnormal CT of the abdomen 2023   • Positive blood culture 2022   • Delirium 2020   • Fall 2020   • Bladder cancer ([facility] [address] ) 2020   • CAD (coronary artery disease) 2020   • H/O heart artery stent    • Umbilical hernia    • Odontoid fracture with type II morphology ([facility] [address] ) 2020   • Multiple abrasions 2020   • Nasal bones, closed fracture 2020   • Atelectasis 2020   • Pulmonary nodule 2020   • Renal cyst 2020   • Liver cyst 2020      LOS (days): 11  Geometric Mean LOS (GMLOS) (days): 3 80  Days to GMLOS:-7 1     OBJECTIVE:  Risk of Unplanned Readmission Score: 15 6      Current admission status: Inpatient   Preferred Pharmacy:   [address] [place] [address] [address] [facility] [address]  Phone: [phone] Fax: [phone]    Primary Care Provider: Gio Bailey PA-C    Primary Insurance: [facility]  Secondary Insurance:     DISCHARGE DETAILS:    Accepting Facility Name, [facility] : [address],  [place], [address] [facility]  Receiving Facility/Agency Phone Number: Phone: [phone]  Facility/Agency Fax Number: Fax: [phone]     CM will schedule BLS transport once auth is obtained

## 2023-05-24 NOTE — CASE MANAGEMENT
CM received notification from Physician Advisor:          Denial Upheld   Rationale for denial upheld: did not feel p met criteria for acute rehab  Insurer: Ranjit Ventura 's Medical Director: Dr Jaciel Calderón  Date outcome received: 5/24/23 3:40 PM  Facility: [address] [place] [address] # if applicable: [phone]   Start of care date if applicable:  Care Manager Notified: Aleksandar Miranda

## 2023-05-24 NOTE — ASSESSMENT & PLAN NOTE
· Cognitive decline lives with son  · Had reported some thoughts of ending life however he did not mean this  · Was seen by psychiatry and now stable off one-to-one observation  · Patient reported this was sudden frustration but he did not mean it  · No need for inpatient psychiatry    Awaiting rehab

## 2023-05-24 NOTE — ASSESSMENT & PLAN NOTE
Background: History of CAD and aortic aneurysm presents to the hospital found to have left acetabular fracture  · Underwent ORIF and has been doing well  · Currently pain controlled    · Awaiting rehab

## 2023-05-24 NOTE — PROGRESS NOTES
Progress Note - Orthopedics   Temo Davies 80 y o  male MRN: [id number]  Unit/Bed#: -01      Subjective:    80 y  o male POD#9 Left percutaneous acetabulum ORIF  Patient denies any pain in left hip  Patient is currently awaiting rehab placement  Patient offers no other complaints at this time       Labs:  0   Lab Value Date/Time    HCT 32 6 (L) 05/23/2023 0454    HCT 32 9 (L) 05/21/2023 0439    HCT 33 8 (L) 05/20/2023 0531    HGB 10 5 (L) 05/23/2023 0454    HGB 10 9 (L) 05/21/2023 0439    HGB 11 5 (L) 05/20/2023 0531    INR 1 06 04/20/2022 1828    WBC 10 11 05/23/2023 0454    WBC 7 31 05/21/2023 0439    WBC 7 68 05/20/2023 0531       Meds:    Current Facility-Administered Medications:   •  acetaminophen (TYLENOL) tablet 975 mg, 975 mg, Oral, Q8H [address], Vern Kumar PA-C, 975 mg at 05/24/23 0608  •  aspirin chewable tablet 81 mg, 81 mg, Oral, Daily, Vern Kumar PA-C, 81 mg at 05/23/23 1011  •  bisacodyl (DULCOLAX) EC tablet 5 mg, 5 mg, Oral, Daily PRN, Judge Isabel MD  •  docusate sodium (COLACE) capsule 100 mg, 100 mg, Oral, BID, Judge Isabel MD, 100 mg at 05/23/23 1746  •  enoxaparin (LOVENOX) subcutaneous injection 40 mg, 40 mg, Subcutaneous, Q24H [address], Vern Kumar PA-C, 40 mg at 05/24/23 0608  •  lidocaine (LIDODERM) 5 % patch 1 patch, 1 patch, Topical, Daily PRN, Cuong Minaya PA-C, 1 patch at 05/21/23 1114  •  naloxone (NARCAN) 0 04 mg/mL syringe 0 04 mg, 0 04 mg, Intravenous, Q1MIN PRN, Cuong Minaya PA-C  •  niacin (NIASPAN) CR tablet 500 mg, 500 mg, Oral, Daily With Jill Townsend PA-C, 500 mg at 05/23/23 1651  •  nicotine (NICODERM CQ) 14 mg/24hr TD 24 hr patch 1 patch, 1 patch, Transdermal, Daily, Cuong Minaya PA-C, 1 patch at 05/23/23 1012  •  oxyCODONE (ROXICODONE) IR tablet 5 mg, 5 mg, Oral, Q6H PRN, Jordon Garcia MD, 5 mg at 05/23/23 1747  •  polyethylene glycol (MIRALAX) packet 17 g, 17 g, Oral, Daily PRN, Cuong Minaya "PA-PENNY  •  polyethylene glycol (MIRALAX) packet 17 g, 17 g, Oral, Daily, Teto Cardoza MD, 17 g at 05/23/23 1011  •  senna (SENOKOT) tablet 8 6 mg, 1 tablet, Oral, HS, Teto Cardoza MD, 8 6 mg at 05/23/23 2348    Blood Culture:   Lab Results   Component Value Date    BLOODCX No Growth After 5 Days  04/20/2022    BLOODCX Bacillus species NOT anthracis (A) 04/20/2022       Wound Culture:   No results found for: \"WOUNDCULT\"    Ins and Outs:  I/O last 24 hours: In: 0 [P O :590]  Out: 300 [Urine:300]          Physical:  Vitals:    05/23/23 2227   BP: 155/77   Pulse:    Resp:    Temp: 98 4 °F (36 9 °C)   SpO2:        Musculoskeletal: left Lower Extremity  • Patient is a 68-year-old male resting comfortably in hospital bed in no distress  • Skin :   Dressing clean dry and intact without soilage  Extremity appears well perfused overall  • TTP  :  No significant bony or soft tissue tenderness appreciated  • Sensation intact to saphenous, sural, tibial, superficial peroneal nerve, and deep peroneal  • Motor intact to +FHL/EHL, +ankle dorsi/plantar flexion  • 2+ DP pulse, symmetric bilaterally   • Digits warm and well perfused  • Capillary refill < 2 seconds      Assessment:    80 y  o male 80 y  o male POD#9 Left percutaneous acetabulum ORIF  Patient doing well  Plan:  • Toe touch weightbearing/50% flat foot weightbearing Left lower extremity with use of walker for safe ambulation  • Continue to offload left side to prevent pressure injuries  /  Wound dehiscence   • PT/OT- up and out of bed with above restrictions  • Pain control  • DVT ppx- Recommend 4 weeks of anticoagulation post operatively   • Dispo: Overall, patient doing well s/p surgical intervention  Please see above for weightbearing recommendations   Once patient medically stable, he may follow up in office with Dr Sadie Appiah 2 weeks post operatively for reevaluation, new x-rays and consideration of suture removal             Sundar Castillo " MOHSEN

## 2023-05-24 NOTE — CASE MANAGEMENT
Support Center has received intent to deny  Denial received for: Acute Rehab  Facility: [facility]  Denial #: [id number]   Denial Reason: not given in voicemail   Peer to Peer phone#:  [phone] opt 2     Deadline: 4:30 PM EST 5/24  Appeal P# [phone]  Care Manager notified: Mary Laureano / Email sent to physician advisors     Should SNF be requested within 48 hours, it would be authorized

## 2023-05-25 VITALS
HEART RATE: 63 BPM | SYSTOLIC BLOOD PRESSURE: 144 MMHG | OXYGEN SATURATION: 93 % | DIASTOLIC BLOOD PRESSURE: 75 MMHG | TEMPERATURE: 97.3 F | RESPIRATION RATE: 18 BRPM | HEIGHT: 70 IN | WEIGHT: 135.58 LBS | BODY MASS INDEX: 19.41 KG/M2

## 2023-05-25 LAB
FLUAV RNA RESP QL NAA+PROBE: NEGATIVE
FLUBV RNA RESP QL NAA+PROBE: NEGATIVE
RSV RNA RESP QL NAA+PROBE: NEGATIVE
SARS-COV-2 RNA RESP QL NAA+PROBE: NEGATIVE

## 2023-05-25 RX ORDER — ENOXAPARIN SODIUM 100 MG/ML
40 INJECTION SUBCUTANEOUS
Qty: 8 ML | Refills: 0
Start: 2023-05-26 | End: 2023-06-15

## 2023-05-25 RX ORDER — LIDOCAINE 50 MG/G
1 PATCH TOPICAL DAILY PRN
Refills: 0
Start: 2023-05-25

## 2023-05-25 RX ORDER — ENOXAPARIN SODIUM 100 MG/ML
40 INJECTION SUBCUTANEOUS
Qty: 8 ML | Refills: 0
Start: 2023-05-26 | End: 2023-05-25 | Stop reason: SDUPTHER

## 2023-05-25 RX ORDER — POLYETHYLENE GLYCOL 3350 17 G/17G
17 POWDER, FOR SOLUTION ORAL DAILY
Refills: 0
Start: 2023-05-26

## 2023-05-25 RX ADMIN — NICOTINE 1 PATCH: 14 PATCH, EXTENDED RELEASE TRANSDERMAL at 09:22

## 2023-05-25 RX ADMIN — DOCUSATE SODIUM 100 MG: 100 CAPSULE, LIQUID FILLED ORAL at 09:24

## 2023-05-25 RX ADMIN — ACETAMINOPHEN 975 MG: 325 TABLET, FILM COATED ORAL at 13:11

## 2023-05-25 RX ADMIN — POLYETHYLENE GLYCOL 3350 17 G: 17 POWDER, FOR SOLUTION ORAL at 09:23

## 2023-05-25 RX ADMIN — ACETAMINOPHEN 975 MG: 325 TABLET, FILM COATED ORAL at 03:18

## 2023-05-25 RX ADMIN — ASPIRIN 81 MG: 81 TABLET, CHEWABLE ORAL at 09:24

## 2023-05-25 RX ADMIN — ENOXAPARIN SODIUM 40 MG: 40 INJECTION SUBCUTANEOUS at 03:18

## 2023-05-25 NOTE — ASSESSMENT & PLAN NOTE
· Cognitive decline lives with son  · Had reported some thoughts of ending life however he did not mean this  · Was seen by psychiatry and now stable off one-to-one observation  · Patient reported this was sudden frustration but he did not mean it  · No need for inpatient psychiatry    Rehab bed today

## 2023-05-25 NOTE — CASE MANAGEMENT
[facility] [facility] 50 has received approved authorization from insurance:   Srinivasan Pritchett in by Anna Garcia P#  [phone]  Authorization received for: [facility]  Facility: [facility] #: [id number]   Start of Care: 5/25  Next Review Date: 5/27  Continued Stay Care Coordinator: Adeola EVANS#: [phone]  Submit next review to: [phone]   Care Manager notified: Preethi Zavala

## 2023-05-25 NOTE — CASE MANAGEMENT
Case Management Discharge Planning Note    Patient name Lizz Ron  Location [address] 205/-50 MRN [id number]  : 1935 Date 2023       Current Admission Date: 2023  Current Admission Diagnosis:Closed displaced fracture of left acetabulum [facility])   Patient Active Problem List    Diagnosis Date Noted   • MCI (mild cognitive impairment) with memory loss 2023   • Pre-operative clearance 2023   • Closed displaced fracture of left acetabulum ([facility] [address] ) 2023   • Aortic aneurysm ([facility] [address] ) 2023   • Abnormal CT of the abdomen 2023   • Positive blood culture 2022   • Delirium 2020   • Fall 2020   • Bladder cancer ([facility] [address] ) 2020   • CAD (coronary artery disease) 2020   • H/O heart artery stent    • Umbilical hernia    • Odontoid fracture with type II morphology ([facility] [address] ) 2020   • Multiple abrasions 2020   • Nasal bones, closed fracture 2020   • Atelectasis 2020   • Pulmonary nodule 2020   • Renal cyst 2020   • Liver cyst 2020      LOS (days): 12  Geometric Mean LOS (GMLOS) (days): 3 80  Days to GMLOS:-7 9     OBJECTIVE:  Risk of Unplanned Readmission Score: 15 75      Current admission status: Inpatient   Preferred Pharmacy:   [address] [place] [address] [address] [facility] [address]  Phone: [phone] Fax: [phone]    Primary Care Provider: Gomez Montgomery PA-C    Primary Insurance: [facility]  Secondary Insurance:     DISCHARGE DETAILS:  Patient for d/c to [place] for STR placement  Insurance auth obtained and sent to receiving facility  Covid screen obtained per facility request (-)  PASRR completed in [address]  Transport secured for 1:30p - BLS via [facility]   PCS completed and placed in unit binder  Facility contacts updated to include report details

## 2023-05-25 NOTE — DISCHARGE SUMMARY
[address] [facility], [place]  Discharge- Miles Agurry 1935, 80 y o  male MRN: [id number]  Unit/Bed#: -01 Encounter: [id number]  Primary Care Provider: Gilbert Pascual PA-C   Date and time admitted to hospital: 5/13/2023  3:49 PM    MCI (mild cognitive impairment) with memory loss  Assessment & Plan  · Cognitive decline lives with son  · Had reported some thoughts of ending life however he did not mean this  · Was seen by psychiatry and now stable off one-to-one observation  · Patient reported this was sudden frustration but he did not mean it  · No need for inpatient psychiatry  Rehab bed today    Pre-operative clearance  Assessment & Plan  · Patient denies shortness of breath or chest pain prior to fall  · Echocardiogram-EF 55% and wall motion normal; aortic valve mild to moderate regurgitation  · EKG-normal sinus rhythm  · Patient with no postop complications    Abnormal CT of the abdomen  Assessment & Plan  · CT revealed perivesical fat stranding, suggestive of cystitis and patchy opacities at the lung bases, which may represent atelectasis versus pneumonia  · No leukocytosis or fevers   · UA negative   · Monitor off antibiotics    Aortic aneurysm (HCC)  Assessment & Plan  · History of aortic aneurysm status post EVAR with increasing aneurysm sac diameter  · Case was discussed by ED with vascular surgery for type II endoleak  · Follow-up with vascular as an outpatient  CAD (coronary artery disease)  Assessment & Plan  · CAD with possibility of stenting  No chest pain  · Continue aspirin  ·     * Closed displaced fracture of left acetabulum [facility])  Assessment & Plan  Background: History of CAD and aortic aneurysm presents to the hospital found to have left acetabular fracture  · Underwent ORIF and has been doing well  · Currently pain controlled    · Patient going to rehab today      Discharging Physician / Practitioner: Stacey Galeano MD  PCP: Gilbert Pascual PA-C  Admission Date: "  Admission Orders (From admission, onward)     Ordered        05/13/23 2001  INPATIENT ADMISSION  Once                      Discharge Date: 05/25/23    Medical Problems     Resolved Problems  Date Reviewed: 5/25/2023   None         Consultations During Hospital Stay:  · Orthopedic surgery  · Psychiatry    Procedures Performed:   · Open reduction internal fixation of the left transverse acetabulum fracture    Significant Findings / Test Results:   · None    Incidental Findings:   · None    Test Results Pending at Discharge (will require follow up): · None     Outpatient Tests Requested:  · None    Complications: None    Reason for Admission: Left hip and groin pain    Hospital Course:     Madelin Meigs is a 80 y o  male patient who originally presented to the hospital on 5/13/2023 due to left hip and groin pain    History presenting Ramy Cervantes is a 80 y o  male with a PMH of bladder cancer, CAD, dementia who presents with left hip pain  Pain is minimal at rest, worse with movement of left extremity  Typically ambulates with a walker  Patient denies additional concerns at this time other than \"getting something to eat  \"  History is limited due to dementia  Unable to reach son  Per ED report, patient ambulating normally until last evening when he was unable to stand up from the bath  Has been unable to bear weight since this time  Slipped off the couch approximately 2 weeks ago and fell on his bottom  No other falls are reported  Son feels he also has had decreased urine output over the past 24 hours  [employer] course: Patient was noted for above reasons  The patient was taken to the OR  Postop management has been unremarkable  Patient has no complications however he does have episodes of delirium with underlying dementia  At one point he did state that he was going to kill himself but that was an empty threat    Patient was seen by psychiatry and there is nothing concerning as far as " "suicidal or homicidal ideations  The patient was awaiting acute rehab for several days to see whether insurance authorization will go through  They did a peer to peer yesterday and the patient is still denied for acute rehab and is going to go to subacute rehab today  Patient is otherwise stable and at his baseline      Please see above list of diagnoses and related plan for additional information  Condition at Discharge: good     Discharge Day Visit / Exam:     Subjective: Patient seen and examined  No acute events reported  Vitals: Blood Pressure: 144/75 (05/25/23 0725)  Pulse: 63 (05/21/23 2317)  Temperature: (!) 97 3 °F (36 3 °C) (05/25/23 0725)  Temp Source: Oral (05/21/23 2317)  Respirations: 18 (05/25/23 0725)  Height: 5' 10\" (177 8 cm) (05/14/23 1100)  Weight - Scale: 61 5 kg (135 lb 9 3 oz) (05/25/23 0554)  SpO2: 93 % (05/21/23 2317)  Exam:   Physical Exam  (   General Appearance:    Alert, cooperative, no distress, appears stated age                               Lungs:     Clear to auscultation bilaterally, respirations unlabored       Heart:    Regular rate and rhythm, S1 and S2 normal, no murmur, rub    or gallop   Abdomen:     Soft, non-tender, bowel sounds active all four quadrants,     no masses, no organomegaly           Extremities:   Extremities normal, atraumatic, no cyanosis or edema     Discussion with Family: Discussed with the son at the bedside    Discharge instructions/Information to patient and family:   See after visit summary for information provided to patient and family  Provisions for Follow-Up Care:  See after visit summary for information related to follow-up care and any pertinent home health orders        Disposition:     Other: Skilled nursing facility    For Discharges to [facility] SNF:   · Not Applicable to this Patient - Not Applicable to this Patient    Planned Readmission: Not anticipated     Discharge Statement:  I spent 35 minutes discharging the " patient  This time was spent on the day of discharge  I had direct contact with the patient on the day of discharge  Greater than 50% of the total time was spent examining patient, answering all patient questions, arranging and discussing plan of care with patient as well as directly providing post-discharge instructions  Additional time then spent on discharge activities  Discharge Medications:  See after visit summary for reconciled discharge medications provided to patient and family        ** Please Note: This note has been constructed using a voice recognition system **

## 2023-05-25 NOTE — PLAN OF CARE
Problem: Potential for Falls  Goal: Patient will remain free of falls  Description: INTERVENTIONS:  - Educate patient/family on patient safety including physical limitations  - Instruct patient to call for assistance with activity   - Consult OT/PT to assist with strengthening/mobility   - Keep Call bell within reach  - Keep bed low and locked with side rails adjusted as appropriate  - Keep care items and personal belongings within reach  - Initiate and maintain comfort rounds  - Make Fall Risk Sign visible to staff  - Offer Toileting every  Hours, in advance of need  - Initiate/Maintain alarm  - Obtain necessary fall risk management equipment:   - Apply yellow socks and bracelet for high fall risk patients  - Consider moving patient to room near nurses station  Outcome: Progressing     Problem: MOBILITY - ADULT  Goal: Maintain or return to baseline ADL function  Description: INTERVENTIONS:  -  Assess patient's ability to carry out ADLs; assess patient's baseline for ADL function and identify physical deficits which impact ability to perform ADLs (bathing, care of mouth/teeth, toileting, grooming, dressing, etc )  - Assess/evaluate cause of self-care deficits   - Assess range of motion  - Assess patient's mobility; develop plan if impaired  - Assess patient's need for assistive devices and provide as appropriate  - Encourage maximum independence but intervene and supervise when necessary  - Involve family in performance of ADLs  - Assess for home care needs following discharge   - Consider OT consult to assist with ADL evaluation and planning for discharge  - Provide patient education as appropriate  Outcome: Progressing  Goal: Maintains/Returns to pre admission functional level  Description: INTERVENTIONS:  - Perform BMAT or MOVE assessment daily    - Set and communicate daily mobility goal to care team and patient/family/caregiver     - Collaborate with rehabilitation services on mobility goals if consulted  - Perform Range of Motion  times a day  - Reposition patient every  hours  - Dangle patient  times a day  - Stand patient  times a day  - Ambulate patient  times a day  - Out of bed to chair  times a day   - Out of bed for efren times a day  - Out of bed for toileting  - Record patient progress and toleration of activity level   Outcome: Progressing     Problem: Prexisting or High Potential for Compromised Skin Integrity  Goal: Skin integrity is maintained or improved  Description: INTERVENTIONS:  - Identify patients at risk for skin breakdown  - Assess and monitor skin integrity  - Assess and monitor nutrition and hydration status  - Monitor labs   - Assess for incontinence   - Turn and reposition patient  - Assist with mobility/ambulation  - Relieve pressure over bony prominences  - Avoid friction and shearing  - Provide appropriate hygiene as needed including keeping skin clean and dry  - Evaluate need for skin moisturizer/barrier cream  - Collaborate with interdisciplinary team   - Patient/family teaching  - Consider wound care consult   Outcome: Progressing     Problem: Nutrition/Hydration-ADULT  Goal: Nutrient/Hydration intake appropriate for improving, restoring or maintaining nutritional needs  Description: Monitor and assess patient's nutrition/hydration status for malnutrition  Collaborate with interdisciplinary team and initiate plan and interventions as ordered  Monitor patient's weight and dietary intake as ordered or per policy  Utilize nutrition screening tool and intervene as necessary  Determine patient's food preferences and provide high-protein, high-caloric foods as appropriate       INTERVENTIONS:  - Monitor oral intake, urinary output, labs, and treatment plans  - Assess nutrition and hydration status and recommend course of action  - Evaluate amount of meals eaten  - Assist patient with eating if necessary   - Allow adequate time for meals  - Recommend/ encourage appropriate diets, oral nutritional supplements, and vitamin/mineral supplements  - Order, calculate, and assess calorie counts as needed  - Recommend, monitor, and adjust tube feedings and TPN/PPN based on assessed needs  - Assess need for intravenous fluids  - Provide specific nutrition/hydration education as appropriate  - Include patient/family/caregiver in decisions related to nutrition  Outcome: Progressing

## 2023-05-25 NOTE — CASE MANAGEMENT
Case Management Discharge Planning Note    Patient name Maricarmen Silverman  Location [id number] MRN [id number]  : 1935 Date 2023       Current Admission Date: 2023  Current Admission Diagnosis:Closed displaced fracture of left acetabulum [facility])   Patient Active Problem List    Diagnosis Date Noted   • MCI (mild cognitive impairment) with memory loss 2023   • Pre-operative clearance 2023   • Closed displaced fracture of left acetabulum ([facility] ) 2023   • Aortic aneurysm ([facility] ) 2023   • Abnormal CT of the abdomen 2023   • Positive blood culture 2022   • Delirium 2020   • Fall 2020   • Bladder cancer ([facility] ) 2020   • CAD (coronary artery disease) 2020   • H/O heart artery stent 33   • Umbilical hernia    • Odontoid fracture with type II morphology ([facility] ) 2020   • Multiple abrasions 2020   • Nasal bones, closed fracture 2020   • Atelectasis 2020   • Pulmonary nodule 2020   • Renal cyst 2020   • Liver cyst 2020      LOS (days): 12  Geometric Mean LOS (GMLOS) (days): 3 80  Days to GMLOS:-7 8     OBJECTIVE:  Risk of Unplanned Readmission Score: 15 75         Current admission status: Inpatient   Preferred Pharmacy:   [address] [place] [address] [id number] Jenniffer Severe, [address] [facility] [address] [facility] [address]  Phone: [phone] Fax: [phone]    Primary Care Provider: Kayla Weeks PA-C    Primary Insurance: Scott GOMEZ  Secondary Insurance:     [facility] Number: [id number]

## 2023-05-25 NOTE — ASSESSMENT & PLAN NOTE
Background: History of CAD and aortic aneurysm presents to the hospital found to have left acetabular fracture  · Underwent ORIF and has been doing well  · Currently pain controlled    · Patient going to rehab today

## 2023-06-03 DIAGNOSIS — S32.402D CLOSED DISPLACED FRACTURE OF LEFT ACETABULUM WITH ROUTINE HEALING, UNSPECIFIED PORTION OF ACETABULUM, SUBSEQUENT ENCOUNTER: Primary | ICD-10-CM

## 2023-06-06 ENCOUNTER — APPOINTMENT (OUTPATIENT)
Dept: RADIOLOGY | Facility: CLINIC | Age: 88
End: 2023-06-06
Payer: MEDICARE

## 2023-06-06 VITALS
SYSTOLIC BLOOD PRESSURE: 116 MMHG | WEIGHT: 135 LBS | HEART RATE: 76 BPM | BODY MASS INDEX: 19.33 KG/M2 | DIASTOLIC BLOOD PRESSURE: 64 MMHG | HEIGHT: 70 IN

## 2023-06-06 DIAGNOSIS — S32.402D CLOSED DISPLACED FRACTURE OF LEFT ACETABULUM WITH ROUTINE HEALING, UNSPECIFIED PORTION OF ACETABULUM, SUBSEQUENT ENCOUNTER: ICD-10-CM

## 2023-06-06 DIAGNOSIS — S32.402D CLOSED DISPLACED FRACTURE OF LEFT ACETABULUM WITH ROUTINE HEALING, UNSPECIFIED PORTION OF ACETABULUM, SUBSEQUENT ENCOUNTER: Primary | ICD-10-CM

## 2023-06-06 DIAGNOSIS — Z98.890 STATUS POST HIP SURGERY: ICD-10-CM

## 2023-06-06 PROCEDURE — 99024 POSTOP FOLLOW-UP VISIT: CPT | Performed by: ORTHOPAEDIC SURGERY

## 2023-06-06 PROCEDURE — 73503 X-RAY EXAM HIP UNI 4/> VIEWS: CPT

## 2023-06-06 NOTE — PROGRESS NOTES
Orthopaedics Office Visit - Post-Op Patient Visit    ASSESSMENT/PLAN:    Assessment:   3 weeks status post percutaneous fixation Left acetabulum fracture, doing well  DOS 5/15/2023    Plan:   · Continue toe touch weight bearing as tolerated  · Okay to shower and allow soap and water to run over the surgical incisions, pat dry  Continue to avoid submersion in standing water until incisions are fully healed  · May continue with Tylenol/NSAIDs as needed for pain relief  · Follow up in 3 weeks for re-evaluation with x-ray      To Do Next Visit:  Re-evaluation  X-ray left hip    _____________________________________________________  CHIEF COMPLAINT:  Chief Complaint   Patient presents with   • Pelvis - Post-op         SUBJECTIVE:  Samantha Mitchell is a 80 y o  male who presents 3 weeks status post percutaneous fixation Left acetabulum fracture, DOS 5/15/2023  Today he denies pain  Presents today using a wheelchair for ambulatory assistance, however states he has been walking at his facility       PAST MEDICAL HISTORY:  Past Medical History:   Diagnosis Date   • AMS (altered mental status) 4/20/2022   • Bladder cancer ([facility] )    • CAD (coronary artery disease)    • Dyslipidemia    • History of heart artery stent    • Umbilical hernia        PAST SURGICAL HISTORY:  Past Surgical History:   Procedure Laterality Date   • BLADDER SURGERY     • CORONARY ANGIOPLASTY WITH STENT PLACEMENT     • ORIF PELVIS Left 5/15/2023    Procedure: Percutaneous fixation L acetabulum fracture;  Surgeon: Merritt Almeida MD;  Location: [facility];  Service: Orthopedics   • TRANSODONTOID FUSION N/A 5/4/2020    Procedure: Transodontoid Fusion;  Surgeon: Johnnie Giles MD;  Location:  MAIN OR;  Service: Neurosurgery       FAMILY HISTORY:  Family History   Family history unknown: Yes       SOCIAL HISTORY:  Social History     Tobacco Use   • Smoking status: Every Day     Packs/day: 0 50     Types: Cigarettes   • Smokeless tobacco: Never   Vaping "Use   • Vaping Use: Never used   Substance Use Topics   • Alcohol use: Yes   • Drug use: Never       MEDICATIONS:    Current Outpatient Medications:   •  acetaminophen (TYLENOL) 500 mg tablet, Take 1 tablet (500 mg total) by mouth every 4 (four) hours as needed for moderate pain or headaches, Disp: 30 tablet, Rfl: 0  •  aspirin 81 mg chewable tablet, Chew 81 mg daily, Disp: , Rfl:   •  enoxaparin (LOVENOX) 40 mg/0 4 mL, Inject 0 4 mL (40 mg total) under the skin every 24 hours for 20 days Do not start before May 26, 2023 , Disp: 8 mL, Rfl: 0  •  guaiFENesin (ROBITUSSIN) 100 MG/5ML oral liquid, Take 10 mL (200 mg total) by mouth every 4 (four) hours as needed for cough or congestion, Disp: 120 mL, Rfl: 0  •  lidocaine (LIDODERM) 5 %, Apply 1 patch topically over 12 hours daily as needed (hip pain) Remove & Discard patch within 12 hours or as directed by MD, Disp: , Rfl: 0  •  niacin (NIASPAN) 500 mg CR tablet, Take 500 mg by mouth, Disp: , Rfl:   •  nicotine (NICODERM CQ) 14 mg/24hr TD 24 hr patch, Place 1 patch on the skin daily, Disp: 28 patch, Rfl: 0  •  polyethylene glycol (MIRALAX) 17 g packet, Take 17 g by mouth daily Do not start before May 26, 2023 , Disp: , Rfl: 0    ALLERGIES:  Allergies   Allergen Reactions   • Iodine - Food Allergy      Other reaction(s): Unknown Reaction       REVIEW OF SYSTEMS:  MSK: left hip pain  Neuro: WNL  Pertinent items are otherwise noted in HPI  A comprehensive review of systems was otherwise negative      LABS:  HgA1c:   Lab Results   Component Value Date    HGBA1C 5 7 (H) 05/03/2020     BMP:   Lab Results   Component Value Date    BUN 29 (H) 05/23/2023    CALCIUM 9 6 05/23/2023     05/23/2023    CO2 23 05/23/2023    CREATININE 1 06 05/23/2023    K 4 6 05/23/2023     CBC: No components found for: \"CBC\"    _____________________________________________________  PHYSICAL EXAMINATION:  Vital signs: /64   Pulse 76   Ht 5' 10\" (1 778 m)   Wt 61 2 kg (135 lb)   BMI " 19 37 kg/m²   General: No acute distress, awake and alert  Psychiatric: Mood and affect appear appropriate  HEENT: Trachea Midline, No torticollis, no apparent facial trauma  Cardiovascular: No audible murmurs;  Extremities appear perfused  Pulmonary: No audible wheezing or stridor  Skin: No open lesions; see further details (if any) below    MUSCULOSKELETAL EXAMINATION:  Extremities:  Left hip  No erythema, drainage from surgical incision  Sutures remain intact  Patient able to sit comfortably in no distress with hip flexed to 90 degrees  Sensation intact in DP/SP/Gonzales/Sa/T nerve distributions  2+ DP & PT pulses  Brisk capillary refill in all toes        _____________________________________________________  STUDIES REVIEWED:  I personally reviewed the images and interpretation is as follows:  X-rays of the left hip obtained today reviewed and demonstrate: Orthopedic hardware in excellent positional alignment with no signs of loosening or hardware failure    PROCEDURES PERFORMED:  Procedures   No procedures performed today    Scribe Attestation    I,:  Sandeep Gutierrez am acting as a scribe while in the presence of the attending physician :       I,:  Reji Morrell MD personally performed the services described in this documentation    as scribed in my presence :

## 2023-06-07 ENCOUNTER — TELEPHONE (OUTPATIENT)
Dept: OBGYN CLINIC | Facility: HOSPITAL | Age: 88
End: 2023-06-07

## 2023-06-07 NOTE — TELEPHONE ENCOUNTER
Caller: Sabrina Mccurdy    Doctor: Rober Bobby    Reason for call: Would like to speak with you about his dad, he is currently in a rehab center after sx and son would like to speak with someone just to get some updates of how the sx went, isn't getting much help from nursing home. States yesterday he was at 50 % weight baring and today they said he's only toe touch.      Call back#: [phone]

## 2023-06-08 NOTE — TELEPHONE ENCOUNTER
Called and spoke w/Vaughn, son, and reviewed 6/6/23 ov notes w/him to update him on his father's status. He states his dad is still very confused. He is doing some walking with a walker. Son thought pt was 50% WB, but note say toe touch WB. Please advise. He would like to get bedside commode for dad when he gets home. He is hoping that when he gets back home pt will be less confused. Aware pt is to f/u in 3 weeks.      CB: [phone]

## 2023-06-08 NOTE — TELEPHONE ENCOUNTER
Called and spoke to Alphonso Alvarado, pt's son, and relayed Dr Madhu Staley. Attempted to call Leonardo in [place] to give telephone order for 50% WB however unable to reach therapist.    Please fax order to [phone] ATTN:PT/Nursing. Thank you.

## 2023-06-09 ENCOUNTER — TELEPHONE (OUTPATIENT)
Dept: OBGYN CLINIC | Facility: HOSPITAL | Age: 88
End: 2023-06-09

## 2023-06-09 NOTE — TELEPHONE ENCOUNTER
Caller: Esme Peña at [facility] where patient resides at currently for rehab     Doctor: Dr Tabitha Cruz    Reason for call: Esme Peña is calling in from [facility] where the patient is currently a patient currently for rehab, he was seen in the office on 6/6/23 with the Dr and returned with a consult paper and he is to be toe-touch weight barring along with PT to ambulate and she is just wanting to get clarification as to what exactly the patient should be doing  She is asking for a call back relating this as therapy will not work with him until they know as to what he should be doing  Please advise       Call back#: [phone] ext [phone]

## 2023-06-12 ENCOUNTER — TELEPHONE (OUTPATIENT)
Dept: VASCULAR SURGERY | Facility: CLINIC | Age: 88
End: 2023-06-12

## 2023-06-23 DIAGNOSIS — S32.402D CLOSED DISPLACED FRACTURE OF LEFT ACETABULUM WITH ROUTINE HEALING, UNSPECIFIED PORTION OF ACETABULUM, SUBSEQUENT ENCOUNTER: Primary | ICD-10-CM

## 2023-06-27 ENCOUNTER — APPOINTMENT (OUTPATIENT)
Dept: RADIOLOGY | Facility: CLINIC | Age: 88
End: 2023-06-27
Payer: MEDICARE

## 2023-06-27 VITALS — BODY MASS INDEX: 19.33 KG/M2 | WEIGHT: 135 LBS | HEIGHT: 70 IN

## 2023-06-27 DIAGNOSIS — S32.402D CLOSED DISPLACED FRACTURE OF LEFT ACETABULUM WITH ROUTINE HEALING, UNSPECIFIED PORTION OF ACETABULUM, SUBSEQUENT ENCOUNTER: ICD-10-CM

## 2023-06-27 DIAGNOSIS — Z98.890 STATUS POST HIP SURGERY: Primary | ICD-10-CM

## 2023-06-27 PROCEDURE — 73503 X-RAY EXAM HIP UNI 4/> VIEWS: CPT

## 2023-06-27 PROCEDURE — 99024 POSTOP FOLLOW-UP VISIT: CPT | Performed by: ORTHOPAEDIC SURGERY

## 2023-06-27 NOTE — PROGRESS NOTES
Orthopaedics Office Visit - Post-op Patient Visit    ASSESSMENT/PLAN:    Assessment:   Approx  6 weeks s/p percutaneous fixation Left acetabulum fracture, DOS 5/15/2023     Plan:   · The patient's x-rays were reviewed today  · He may weight bear as tolerated  · Continue with daily washing to incision  · Continue with physical therapy  · Follow-up in 3 months      To Do Next Visit:  Re-evaluation, new x-rays     _____________________________________________________  CHIEF COMPLAINT:  Chief Complaint   Patient presents with   • Pelvis - Post-op         SUBJECTIVE:  Gabriel Brasher is a 80 y o  male who presents approximately 6 weeks status post percutaneous fixation Left acetabulum fracture performed on 5/15/2023  He believes he will be discharged from the facility this weekend to return home  He has been attending physical therapy within the rehab facility  He presents in a wheel chair today  He is not experiencing any pain  SOCIAL HISTORY:  Social History     Tobacco Use   • Smoking status: Every Day     Packs/day: 0 50     Types: Cigarettes   • Smokeless tobacco: Never   Vaping Use   • Vaping Use: Never used   Substance Use Topics   • Alcohol use:  Yes   • Drug use: Never       MEDICATIONS:    Current Outpatient Medications:   •  acetaminophen (TYLENOL) 500 mg tablet, Take 1 tablet (500 mg total) by mouth every 4 (four) hours as needed for moderate pain or headaches, Disp: 30 tablet, Rfl: 0  •  aspirin 81 mg chewable tablet, Chew 81 mg daily, Disp: , Rfl:   •  guaiFENesin (ROBITUSSIN) 100 MG/5ML oral liquid, Take 10 mL (200 mg total) by mouth every 4 (four) hours as needed for cough or congestion, Disp: 120 mL, Rfl: 0  •  lidocaine (LIDODERM) 5 %, Apply 1 patch topically over 12 hours daily as needed (hip pain) Remove & Discard patch within 12 hours or as directed by MD, Disp: , Rfl: 0  •  niacin (NIASPAN) 500 mg CR tablet, Take 500 mg by mouth, Disp: , Rfl:   •  nicotine (NICODERM CQ) 14 mg/24hr TD 24 "hr patch, Place 1 patch on the skin daily, Disp: 28 patch, Rfl: 0  •  polyethylene glycol (MIRALAX) 17 g packet, Take 17 g by mouth daily Do not start before May 26, 2023 , Disp: , Rfl: 0  •  enoxaparin (LOVENOX) 40 mg/0 4 mL, Inject 0 4 mL (40 mg total) under the skin every 24 hours for 20 days Do not start before May 26, 2023 , Disp: 8 mL, Rfl: 0    REVIEW OF SYSTEMS:  MSK: left hip pain  Neuro: WNL  Pertinent items are otherwise noted in HPI  A comprehensive review of systems was otherwise negative     _____________________________________________________  PHYSICAL EXAMINATION:  Vital signs: Ht 5' 10\" (1 778 m)   Wt 61 2 kg (135 lb)   BMI 19 37 kg/m²   General: No acute distress, awake and alert  Psychiatric: Mood and affect appear appropriate  HEENT: Trachea Midline, No torticollis, no apparent facial trauma  Cardiovascular: No audible murmurs; Extremities appear perfused  Pulmonary: No audible wheezing or stridor  Skin: No open lesions; see further details (if any) below    MUSCULOSKELETAL EXAMINATION:  Extremities:    Left Hip:  Patient sits comfortably with hips flexed to 90 degrees in no acute distress   Smooth circumduction of the hip    Able to perform straight leg raise with resistance   Able to perform hip flexion with resistance   Able to perform hip abduction with resistance   Able to resist dorsiflexion   Skin is intact  No erythema  The incision is clean, dry and intact  Patient can ambulate with assistance, leaning forward, slow   Calf is supple and non-tender  Sensation normal to L2-S1 dermatomes   Extremity warm and well perfused       _____________________________________________________  STUDIES REVIEWED:  I personally reviewed the images and interpretation is as follows:    X-rays taken 6/27/23 of left hip demonstrate orthopedic screws in stable alignment without evidence of loosening or failure  No acute osseous abnormality or fracture      PROCEDURES PERFORMED:  Procedures  None " performed         Scribe Attestation    I,:  Maikol Pabon am acting as a scribe while in the presence of the attending physician :       I,:  Veronica Augustine MD personally performed the services described in this documentation    as scribed in my presence :

## 2023-09-29 DIAGNOSIS — S32.402D CLOSED DISPLACED FRACTURE OF LEFT ACETABULUM WITH ROUTINE HEALING, UNSPECIFIED PORTION OF ACETABULUM, SUBSEQUENT ENCOUNTER: Primary | ICD-10-CM

## (undated) DEVICE — 3M™ TEGADERM™ TRANSPARENT FILM DRESSING FRAME STYLE, 1626W, 4 IN X 4-3/4 IN (10 CM X 12 CM), 50/CT 4CT/CASE: Brand: 3M™ TEGADERM™

## (undated) DEVICE — GLOVE INDICATOR PI UNDERGLOVE SZ 8 BLUE

## (undated) DEVICE — LIGHT HANDLE COVER SLEEVE DISP BLUE STELLAR

## (undated) DEVICE — SPONGE SCRUB 4 PCT CHLORHEXIDINE

## (undated) DEVICE — INTENDED FOR TISSUE SEPARATION, AND OTHER PROCEDURES THAT REQUIRE A SHARP SURGICAL BLADE TO PUNCTURE OR CUT.: Brand: BARD-PARKER SAFETY BLADES SIZE 15, STERILE

## (undated) DEVICE — NEPTUNE E-SEP SMOKE EVACUATION PENCIL, COATED, 70MM BLADE, PUSH BUTTON SWITCH: Brand: NEPTUNE E-SEP

## (undated) DEVICE — INTENDED FOR TISSUE SEPARATION, AND OTHER PROCEDURES THAT REQUIRE A SHARP SURGICAL BLADE TO PUNCTURE OR CUT.: Brand: BARD-PARKER SAFETY BLADES SIZE 10, STERILE

## (undated) DEVICE — SUT VICRYL PLUS 3-0 RB-1 CR/8 18 IN VCP713D

## (undated) DEVICE — GAUZE SPONGES,16 PLY: Brand: CURITY

## (undated) DEVICE — SUT VICRYL PLUS 2-0 CTB-1 27 IN VCPB259H

## (undated) DEVICE — SILVER-COATED ANTIMICROBIAL BARRIER DRESSING: Brand: ACTICOAT   4" X 8"

## (undated) DEVICE — DRAPE SHEET THREE QUARTER

## (undated) DEVICE — BETHLEHEM TOTAL HIP, KIT: Brand: CARDINAL HEALTH

## (undated) DEVICE — CHLORAPREP HI-LITE 26ML ORANGE

## (undated) DEVICE — SKIN MARKER DUAL TIP WITH RULER CAP, FLEXIBLE RULER AND LABELS: Brand: DEVON

## (undated) DEVICE — GLOVE INDICATOR PI UNDERGLOVE SZ 8.5 BLUE

## (undated) DEVICE — ELECTRODE BLADE MOD  E-Z CLEAN 6.5IN -0014M

## (undated) DEVICE — ELECTRODE BLADE MOD E-Z CLEAN 2.5IN 6.4CM -0012M

## (undated) DEVICE — TIBURON SPLIT SHEET: Brand: CONVERTORS

## (undated) DEVICE — ADHESIVE SKIN HIGH VISCOSITY EXOFIN 1ML

## (undated) DEVICE — Device

## (undated) DEVICE — PROXIMATE SKIN STAPLERS (35 WIDE) CONTAINS 35 STAINLESS STEEL STAPLES (FIXED HEAD): Brand: PROXIMATE

## (undated) DEVICE — ELECTRODE BLADE E-Z CLEAN 4IN -0014A

## (undated) DEVICE — PENCIL ELECTROSURG E-Z CLEAN -0035H

## (undated) DEVICE — 3M™ IOBAN™ 2 ANTIMICROBIAL INCISE DRAPE 6650EZ: Brand: IOBAN™ 2

## (undated) DEVICE — SPONGE PVP SCRUB WING STERILE

## (undated) DEVICE — ROSEBUD DISSECTORS: Brand: DEROYAL

## (undated) DEVICE — DRAPE TOWEL: Brand: CONVERTORS

## (undated) DEVICE — MEDI-VAC YANKAUER SUCTION HANDLE W/STRAIGHT TIP & CONTROL VENT: Brand: CARDINAL HEALTH

## (undated) DEVICE — PLASTIC ADHESIVE BANDAGE: Brand: CURITY

## (undated) DEVICE — TRAY FOLEY 16FR URIMETER SURESTEP

## (undated) DEVICE — SUT VICRYL PLUS 1 CTB-1 36 IN VCPB947H

## (undated) DEVICE — GLOVE SRG BIOGEL 8

## (undated) DEVICE — BETHLEHEM UNIVERSAL SPINE, KIT: Brand: CARDINAL HEALTH

## (undated) DEVICE — DISPOSABLE EQUIPMENT COVER: Brand: SMALL TOWEL DRAPE

## (undated) DEVICE — INTENDED FOR TISSUE SEPARATION, AND OTHER PROCEDURES THAT REQUIRE A SHARP SURGICAL BLADE TO PUNCTURE OR CUT.: Brand: BARD-PARKER ® CARBON RIB-BACK BLADES

## (undated) DEVICE — IMPERVIOUS STOCKINETTE: Brand: DEROYAL

## (undated) DEVICE — POOLE SUCTION HANDLE: Brand: CARDINAL HEALTH

## (undated) DEVICE — CURITY NON-ADHERENT STRIPS: Brand: CURITY

## (undated) DEVICE — GLOVE SRG BIOGEL ORTHOPEDIC 8

## (undated) DEVICE — INSTRUMENT 873-004 UCS GUIDEWIRE THRD

## (undated) DEVICE — UCSS CANNULATED DRILL BIT STERILE: Brand: MEDTRONIC REUSABLE INSTRUMENT

## (undated) DEVICE — SNAP KOVER: Brand: UNBRANDED

## (undated) DEVICE — DRAPE C-ARMOUR

## (undated) DEVICE — PAD GROUNDING ADULT

## (undated) DEVICE — MAYFIELD® DISPOSABLE ADULT SKULL PIN (PLASTIC BASE): Brand: MAYFIELD®

## (undated) DEVICE — 4-PORT MANIFOLD: Brand: NEPTUNE 2

## (undated) DEVICE — DRESSING MEPILEX AG BORDER 4 X 4 IN

## (undated) DEVICE — DRAPE SHEET X-LG

## (undated) DEVICE — BULB SYRINGE,IRRIGATION WITH PROTECTIVE CAP: Brand: DOVER

## (undated) DEVICE — DRAPE C-ARM X-RAY

## (undated) DEVICE — 3M™ STERI-DRAPE™ INCISE DRAPE 1040 (35CM X 35CM): Brand: STERI-DRAPE™